# Patient Record
Sex: MALE | Race: WHITE | NOT HISPANIC OR LATINO | Employment: OTHER | URBAN - NONMETROPOLITAN AREA
[De-identification: names, ages, dates, MRNs, and addresses within clinical notes are randomized per-mention and may not be internally consistent; named-entity substitution may affect disease eponyms.]

---

## 2023-10-31 PROBLEM — I48.4 ATYPICAL ATRIAL FLUTTER (MULTI): Status: ACTIVE | Noted: 2023-10-31

## 2023-10-31 PROBLEM — R06.09 DYSPNEA ON EXERTION: Status: ACTIVE | Noted: 2023-10-31

## 2023-10-31 PROBLEM — E66.3 OVERWEIGHT WITH BODY MASS INDEX (BMI) OF 27 TO 27.9 IN ADULT: Status: ACTIVE | Noted: 2023-10-31

## 2023-10-31 PROBLEM — I42.8 NONISCHEMIC CARDIOMYOPATHY (MULTI): Status: ACTIVE | Noted: 2023-10-31

## 2023-10-31 PROBLEM — Z87.891 FORMER SMOKER: Status: ACTIVE | Noted: 2023-10-31

## 2023-10-31 PROBLEM — I50.9 CHF (NYHA CLASS II, ACC/AHA STAGE C) (MULTI): Status: ACTIVE | Noted: 2023-10-31

## 2023-10-31 PROBLEM — I48.91 ATRIAL FIBRILLATION (MULTI): Status: ACTIVE | Noted: 2023-10-31

## 2023-10-31 RX ORDER — PANTOPRAZOLE SODIUM 40 MG/1
1 TABLET, DELAYED RELEASE ORAL DAILY
COMMUNITY
End: 2023-11-01 | Stop reason: ALTCHOICE

## 2023-10-31 RX ORDER — CARVEDILOL 6.25 MG/1
3.12 TABLET ORAL 2 TIMES DAILY
COMMUNITY
Start: 2022-08-05 | End: 2024-05-07 | Stop reason: DRUGHIGH

## 2023-10-31 RX ORDER — SPIRONOLACTONE 25 MG/1
25 TABLET ORAL DAILY
COMMUNITY
End: 2023-11-01 | Stop reason: ALTCHOICE

## 2023-10-31 RX ORDER — CHLORHEXIDINE GLUCONATE 40 MG/ML
SOLUTION TOPICAL
COMMUNITY
Start: 2022-11-14 | End: 2023-11-01 | Stop reason: ALTCHOICE

## 2023-10-31 RX ORDER — LOSARTAN POTASSIUM 25 MG/1
1 TABLET ORAL DAILY
COMMUNITY
Start: 2022-05-26 | End: 2024-05-16

## 2023-10-31 RX ORDER — EPLERENONE 25 MG/1
1 TABLET, FILM COATED ORAL DAILY
COMMUNITY
Start: 2022-05-26 | End: 2023-11-01

## 2023-11-01 ENCOUNTER — OFFICE VISIT (OUTPATIENT)
Dept: CARDIOLOGY | Facility: CLINIC | Age: 78
End: 2023-11-01
Payer: MEDICARE

## 2023-11-01 VITALS
WEIGHT: 175 LBS | HEIGHT: 67 IN | HEART RATE: 62 BPM | BODY MASS INDEX: 27.47 KG/M2 | SYSTOLIC BLOOD PRESSURE: 108 MMHG | DIASTOLIC BLOOD PRESSURE: 62 MMHG

## 2023-11-01 DIAGNOSIS — E66.3 OVERWEIGHT WITH BODY MASS INDEX (BMI) OF 27 TO 27.9 IN ADULT: ICD-10-CM

## 2023-11-01 DIAGNOSIS — Z79.01 LONG TERM CURRENT USE OF ANTICOAGULANT THERAPY: ICD-10-CM

## 2023-11-01 DIAGNOSIS — I42.8 NONISCHEMIC CARDIOMYOPATHY (MULTI): ICD-10-CM

## 2023-11-01 DIAGNOSIS — I48.0 PAROXYSMAL ATRIAL FIBRILLATION (MULTI): Primary | ICD-10-CM

## 2023-11-01 PROCEDURE — 99213 OFFICE O/P EST LOW 20 MIN: CPT | Performed by: NURSE PRACTITIONER

## 2023-11-01 PROCEDURE — 1036F TOBACCO NON-USER: CPT | Performed by: NURSE PRACTITIONER

## 2023-11-01 PROCEDURE — 1126F AMNT PAIN NOTED NONE PRSNT: CPT | Performed by: NURSE PRACTITIONER

## 2023-11-01 PROCEDURE — 1159F MED LIST DOCD IN RCRD: CPT | Performed by: NURSE PRACTITIONER

## 2023-11-01 PROCEDURE — 1160F RVW MEDS BY RX/DR IN RCRD: CPT | Performed by: NURSE PRACTITIONER

## 2023-11-01 RX ORDER — SPIRONOLACTONE 25 MG/1
25 TABLET ORAL DAILY
Qty: 90 TABLET | Refills: 3 | Status: SHIPPED | OUTPATIENT
Start: 2023-11-01

## 2023-11-01 NOTE — PATIENT INSTRUCTIONS
Please bring all medicines, vitamins, and herbal supplements with you when you come to the office.    Prescriptions will not be filled unless you are compliant with your follow up appointments or have a follow up appointment scheduled as per instruction of your physician. Refills should be requested at the time of your visit.    PLAN:   Through informed decision making process incorporating patients unique circumstances, the following treatment plan will be initiated:    1.  Prescription drug management of cardiovascular medication for efficacy, adherence to treatment, side effect assessment and polypharmacy. Current treatment clinically warranted and to continue without modifications.    2.  Chem6    3.  Return for follow-up; in the interim, contact the office if new symptoms arise.  Dr. Bennett 6 months

## 2023-11-02 ASSESSMENT — ENCOUNTER SYMPTOMS
PND: 0
PALPITATIONS: 0
NEAR-SYNCOPE: 0
SYNCOPE: 0
IRREGULAR HEARTBEAT: 0
ORTHOPNEA: 0
DYSPNEA ON EXERTION: 0

## 2023-11-02 NOTE — ASSESSMENT & PLAN NOTE
CHADS VASc 4 anticoagulated full dose Xarelto  Currently denies bleeding diatheses  Needs annual labs for creatinine

## 2023-11-02 NOTE — PROGRESS NOTES
"Chief Complaint  \"Doing just fine\"    Reason for Visit  Routine 6-month follow-up  Patient presents to the office today for outpatient follow-up for atrial fibrillation, anticoagulation, cardiomyopathy.  Last evaluated in clinic by Dr. Bennett May 2023.    Presents today ambulatory with steady gait.  Accompanied by spouse  Patient denies any hospitalizations or significant changes to interval medical history since last office follow-up.   No routine PCP follow-up, no labs this year.    History of Present Illness   Patient is an extremely pleasant 78-year-old gentleman who presents without voice cardiovascular complaints.  Remains aerobically active where he walks 2 miles daily, over the summer was able to do yard work, he has stairs that he needs to go up and down at home.  He denies any type of exertional complaints.  He ambulated in from the parking lot without concerns.    He reports being highly symptomatic in atrial fibrillation and denies any recurrence.  Remains anticoagulated.  Need to check renal function for creatinine clearance.    Improved LVEF 50 to 55%.  Discussed rationale behind remaining on GDMT.    Patient reports that overall has no complaint(s) of chest pain, exertional chest pressure/discomfort, irregular heart beat, and palpitations    Daily activity:   aerobically active, walks 2 miles   Denies any change in exercise capacity or functional tolerance since last office visit.    Overall patient is pleased with current state of cardiovascular health.  At this time there are no indications for additional cardiovascular testing or need for medication changes.    Discussed the dynamic nature of coronary artery disease and the importance of seeking medical attention if new symptoms arise.    Review of Systems   Cardiovascular:  Negative for chest pain, dyspnea on exertion, irregular heartbeat, leg swelling, near-syncope, orthopnea, palpitations, paroxysmal nocturnal dyspnea and syncope.        Visit " "Vitals  /62 (BP Location: Right arm, Patient Position: Sitting)   Pulse 62   Ht 1.702 m (5' 7\")   Wt 79.4 kg (175 lb)   BMI 27.41 kg/m²   Smoking Status Former   BSA 1.94 m²     Physical Exam  Vitals and nursing note reviewed.   Constitutional:       Appearance: Normal appearance.   Cardiovascular:      Rate and Rhythm: Normal rate and regular rhythm.      Heart sounds: Normal heart sounds.   Pulmonary:      Effort: Pulmonary effort is normal.      Breath sounds: Normal breath sounds.   Musculoskeletal:      Cervical back: Full passive range of motion without pain.      Right lower leg: No edema.      Left lower leg: No edema.   Skin:     General: Skin is cool.   Neurological:      Mental Status: He is alert and oriented to person, place, and time.   Psychiatric:         Attention and Perception: Attention normal.         Mood and Affect: Mood normal.         Behavior: Behavior is cooperative.        No Known Allergies     Current Outpatient Medications   Medication Instructions    carvedilol (COREG) 3.125 mg, oral, 2 times daily    losartan (Cozaar) 25 mg tablet 1 tablet, oral, Daily    rivaroxaban (XARELTO) 20 mg, oral, Daily, Take with food.    spironolactone (ALDACTONE) 25 mg, oral, Daily      Assessment:      Atrial fibrillation (CMS/HCC)  Dec 2022 RFA ablation  Jan 2023 DCC  Denies recurrent symptoms    Nonischemic cardiomyopathy (CMS/HCC)  May 2022 TTE EF 40-45% (afib)    May 2023 TTE  LVEF 50-55%  LA mild/moderate  RA mod/severe dilated  MR mild  RVSP 36    To continue GDMT    Cardiac and Vasculature  May 2022 MPI no ischemia  Daily activity greater than 4 METS without concerning symptoms    Long term current use of anticoagulant therapy  CHADS VASc 4 anticoagulated full dose Xarelto  Currently denies bleeding diatheses  Needs annual labs for creatinine    Overweight with body mass index (BMI) of 27 to 27.9 in adult  Reviewed the merits of healthy lifestyle choices on overall cardiovascular health.   "   Plan:     Through informed decision making process incorporating patients unique circumstances, the following treatment plan will be initiated:    1.  Prescription drug management of cardiovascular medication for efficacy, adherence to treatment, side effect assessment and polypharmacy. Current treatment clinically warranted and to continue without modifications.    2.  Chem6    3.  Return for follow-up; in the interim, contact the office if new symptoms arise.  Dr. Bennett 6 months      Carmela Urena  MSN, APRN-CNP, PMHNP-BC  [facility]    Please excuse any errors in grammar or translation related to this dictation. Voice recognition software was utilized to prepare this document.

## 2023-11-02 NOTE — ASSESSMENT & PLAN NOTE
May 2022 TTE EF 40-45% (afib)    May 2023 TTE  LVEF 50-55%  LA mild/moderate  RA mod/severe dilated  MR mild  RVSP 36    To continue GDMT

## 2023-11-07 ENCOUNTER — HOSPITAL ENCOUNTER
Dept: HOSPITAL 101 - LAB | Age: 78
Discharge: HOME | End: 2023-11-07
Payer: MEDICARE

## 2023-11-07 DIAGNOSIS — I42.8: ICD-10-CM

## 2023-11-07 DIAGNOSIS — Z79.01: ICD-10-CM

## 2023-11-07 DIAGNOSIS — I48.0: Primary | ICD-10-CM

## 2023-11-07 LAB
ANION GAP: 13.1
BLOOD UREA NITROGEN: 22 MG/DL (ref 7–18)
CALCIUM: 9 MG/DL (ref 8.5–10.1)
CARBON DIOXIDE: 28.5 MMOL/L (ref 21–32)
CHLORIDE: 104 MMOL/L (ref 98–107)
CO2 BLD-SCNC: 28.5 MMOL/L (ref 21–32)
ESTIMATED GFR (AFRICAN AMERICA: >60 (ref 60–?)
ESTIMATED GFR (NON-AFRICAN AME: >60 (ref 60–?)
GLUCOSE BLD-MCNC: 92 MG/DL (ref 74–106)
POTASSIUM SERPLBLD-SCNC: 4.6 MMOL/L (ref 3.5–5.1)
POTASSIUM: 4.6 MMOL/L (ref 3.5–5.1)
SODIUM BLD-SCNC: 141 MMOL/L (ref 136–145)
SODIUM: 141 MMOL/L (ref 136–145)

## 2023-11-07 PROCEDURE — 80048 BASIC METABOLIC PNL TOTAL CA: CPT

## 2023-11-07 PROCEDURE — 36415 COLL VENOUS BLD VENIPUNCTURE: CPT

## 2024-01-19 ENCOUNTER — HOSPITAL ENCOUNTER (OUTPATIENT)
Dept: HOSPITAL 101 - LAB | Age: 79
Discharge: HOME | End: 2024-01-19
Payer: MEDICARE

## 2024-01-19 DIAGNOSIS — R05.9: Primary | ICD-10-CM

## 2024-01-19 PROCEDURE — 0202U NFCT DS 22 TRGT SARS-COV-2: CPT

## 2024-01-19 NOTE — XMS_ITS
Comprehensive CCD (C-CDA v2.1)  
  
                          Created on: 2024  
  
  
ENMANUEL ARRINGTON  
External Reference #: [id number]  
: 1945  
Sex: Male  
  
Demographics  
  
  
                                        Address             [address] [place]  [address]  
   
                                        Home Phone          [phone]  
   
                                        Home Phone          [phone]  
   
                                        Preferred Language  en  
   
                                        Marital Status        
   
                                        Muslim Affiliation Unknown  
   
                                        Race                White  
   
                                        Ethnic Group        Not  or Lati  
no  
  
  
Author  
  
  
                                        Name                Unknown  
   
                                        Address             [address] [facility]  
[address]  
[place]  [address]  
   
                                        Phone               [phone]  
   
                                        Organization        [facility]  
  
  
Care Team Providers  
  
  
                                Care Team Member Name Role            Phone  
   
                                MD Leatha Jansen Primary Care Provider [phone]  
[phone]  
   
                                MD Leatha Jansen Attending Provider [phone]  
[phone]  
   
                                DO LAURA Murphy Referring Provider [phone]  
   
                                Leatha Jansen  Unavailable     [phone]  
   
                                Unavailable     Unavailable     [phone]  
   
                                DO LAURA Murphy Attending Provider [phone]  
   
                                MD George Leonard Referring Provider [phone]  
   
                                DR LEATHA JANSEN Primary Care    Unavailable  
   
                                DR LEATHA JANSEN Attending       Unavailable  
   
                                SOM, DR ZHANG Admitting       Unavailable  
   
                                SOM, DR ZHANG Primary Care    Unavailable  
   
                                SOM, DR ZHANG Attending       Unavailable  
   
                                SOM, DR ZHANG Admitting       Unavailable  
   
                                SOM, DR ZHANG Attending       Unavailable  
   
                                OSM, DR ZHANG Admitting       Unavailable  
   
                                DR LEATHA JANSEN Primary Care    Unavailable  
   
                                DR FRANCIA MONTES Consulting      Unavailable  
   
                                DR LEATHA JANSEN Consulting      Unavailable  
   
                                DR ALYCE CAZARES Consulting      Unavailable  
   
                                CORKY GONZALEZ Consulting      Unavailable  
   
                                SURENDRA MANCERA Consulting      Unavailable  
   
                                SOCRATES RICHARDS       Consulting      Unavailable  
   
                                DR LEATHA JANSEN Primary Care    Unavailable  
   
                                FAWWAD, BLAIR H Admitting       Unavailable  
   
                                FAWWAD, BLAIR H Attending       Unavailable  
   
                                FAWWAD, BLAIR H Attending       Unavailable  
   
                                FAWWAD, BLAIR H Admitting       Unavailable  
   
                                DR LEATHA JANSEN Primary Care    Unavailable  
   
                                FAWWAD, BLAIR H Attending       Unavailable  
   
                                FAWWAD, BLAIR H Admitting       Unavailable  
   
                                DR LEATHA JANSEN Primary Care    Unavailable  
   
                                FAWWAD, BLAIR H Attending       Unavailable  
   
                                FAWWAD, BLAIR H Admitting       Unavailable  
   
                                DR LEATHA JANSEN Primary Care    Unavailable  
   
                                FAWWAD, BLAIR H Attending       Unavailable  
   
                                FAWWAD, BLAIR H Admitting       Unavailable  
   
                                HOY, DR ZHANG Primary Care    Unavailable  
   
                                FAWWAD, BLAIR H Attending       Unavailable  
   
                                FAWWAD, BLAIR H Admitting       Unavailable  
   
                                HOY, DR ZHANG Primary Care    Unavailable  
   
                                HOY, DR ZHANG Consulting      Unavailable  
   
                                HOY, DR ZHANG Attending       Unavailable  
   
                                HOY, DR ZHANG Admitting       Unavailable  
   
                                HOY, DR ZHANG Primary Care    Unavailable  
   
                                HOY, DR ZHANG Consulting      Unavailable  
   
                                HOY, DR ZHANG Attending       Unavailable  
   
                                HOY, DR ZHANG Admitting       Unavailable  
   
                                HOY, DR ZHANG Primary Care    Unavailable  
   
                                Zieber, DR Fleming Consulting      Unavailable  
   
                                HOY, DR ZHANG Consulting      Unavailable  
   
                                HOY, DR ZHANG Attending       Unavailable  
   
                                HOY, DR ZHANG Admitting       Unavailable  
   
                                HOY, DR ZHANG Primary Care    Unavailable  
   
                                HOY, DR ZHANG Attending       Unavailable  
   
                                HOY, DR ZHANG Admitting       Unavailable  
   
                                HOY, DR ZHANG Primary Care    Unavailable  
   
                                HOY, DR ZHANG Consulting      Unavailable  
   
                                LAURA Murphy Admitting       Unavailable  
   
                                JeffreyLAURA millan Referring       Unavailable  
   
                                JeffreyLAURA Attending       Unavailable  
   
                                Hoy, Leatha M  Primary Care    Unavailable  
   
                                JeffreyLAURA Ham Referring       Unavailable  
   
                                Hoy, Leatha M  Admitting       Unavailable  
   
                                Hoy, Leatha M  Attending       Unavailable  
   
                                Hoy, Leatha M  Primary Care    Unavailable  
   
                                JeffreyLAURA millan Attending       Unavailable  
   
                                Hoy, Leatha M  Primary Care    Unavailable  
   
                                Jeffrey, W Ham Admitting       Unavailable  
   
                                JeffreyLAURA Ham Attending       Unavailable  
   
                                Leonard, Vazquez Referring       Unavailable  
   
                                Hoy, Leatha M  Primary Care    Unavailable  
   
                                JeffreyLAURA millan Admitting       Unavailable  
   
                                Honeisha, Dr. Leatha Hawkins Primary Care    Unavail  
able  
   
                                Agustín Murphy Attending       Unavailable  
   
                                Arias, Dr. Donna Hills Attending       Un  
available  
   
                                UNKNOWN, PCP    Referring       Unavailable  
   
                                Hoy, Dr. Leatha Hawkins Primary Care    Unavail  
able  
   
                                Arias, Dr. Donna Hills Attending       Un  
available  
   
                                Arias, Dr. Donna Hills Referring       Un  
available  
   
                                Arias, Dr. Donna Hills Admitting       Un  
available  
   
                                Hoy, Dr. Leatha Hawkins Primary Care    Unavail  
able  
   
                                Hoy, Dr. Leatha Hawkins Primary Care    Unavail  
able  
   
                                Arias, Dr. Donna Hills Attending       Un  
available  
   
                                Agustín Murphy Referring       Unavailable  
   
                                MedSivan holm  Attending       Unavailable  
   
                                Sivan Schuler  Referring       Unavailable  
   
                                Hoy, Dr. Leatha Hawkins Primary Care    Unavail  
able  
   
                                Hoy, Dr. Leatha Hawkins Primary Care    Unavail  
able  
   
                                Meder, Sivan  Attending       Unavailable  
   
                                Dr. Donna Hernandes Attending       Un  
available  
   
                                Dr. Leatha Jansen Primary Care    Unavail  
montez Jansen, Dr. Leatha Hawkins Primary Care    Unavail  
able  
   
                                Agustín Murphy Attending       Unavailable  
   
                                Agustín Murphy Referring       Unavailable  
   
                                Dr. Leatha Jansen Primary Care    Unavail  
montez Jansen, Dr. Leatha Hawkins Referring       Unavail  
Agustín Ramon Attending       Unavailable  
   
                                Leatha Jansen MD Primary Care Provider [phone]  
   
                                SARATH MONTES  Attending       Unavailable  
   
                                LEATHA JANSEN Primary Care    Unavailable  
  
  
  
Medications  
Current Medications  
  
  
  
                      Medication Drug Class(es) Dates      Sig (Normalized) Sig   
(Original)  
   
                                                    apixaban 5 mg oral   
tablet  
(20 sources)                            Factor Xa   
Inhibitor                               Start:   
2022  
End:   
2023                              take 1 tablet by   
mouth twice daily                       Apixaban   
(Eliquis) 5 mg   
Tablet Active 5   
MG PO Twice daily   
2022   
3:25pm  
   
                                                    carvedilol 6.25 mg   
oral tablet  
(20 sources)                            alpha-Adrenergic   
Blocker,   
beta-Adrenergic   
Blocker                                 Start:   
2022                              take 0.5 tablet by   
mouth twice daily                       carvedilol   
(Coreg) 6.25 mg   
tablet Take 0.5   
tablets (3.125   
mg) by mouth   
twice a day. 0   
2022 Active  
  
  
  
                                        Start: 2022   take 1 tablet by liseth  
th twice   
daily at dinner                         Carvedilol 3.125 MG Oral Tablet   
take 1 tablet by mouth twice a day   
with MORNING AND EVENING MEAL   
Quantity: 3 Refills: 3 Ordered:   
10-Claudy-2023 Sivan Torres   
Start : 5-Aug-2022 Active  
   
                                        Start: 2022   take 1 tablet by liseth  
th twice   
daily at mealtime                       Carvedilol 6.25 MG Oral Tablet TAKE   
1 TABLET TWICE DAILY WITH MEALS.   
Quantity: 180 Refills: 3 Ordered:   
26-May-2022 Agustín Murphy DO   
Start : 26-May-2022 Active  
  
  
  
                                                    chlorhexidine gluconate   
1.2 mg/ml mouthwash  
(7 sources)                                         Start: 2022  
End: 2022                         take 15 mL by   
mouth in the   
morning                                 Chlorhexidine Gluconate   
0.12 % Mouth/Throat   
Solution RINSE MOUTH   
WITH 15ML (1 CAPFUL) FOR   
30 SECONDS AM AND PM   
AFTER TOOTHBRUSHING.   
EXPECTORATE AFTER   
RINSING, DO NOT SWALLOW   
Quantity: 1 Refills: 0   
Ordered: 2022   
Choco Perry Start :   
2022 End :   
2022 Active  
  
  
  
                                                    Start: 2022  
End: 2023                                     chlorhexidine (Hibiclens) 4   
% external liquid Apply topically.   
0   
2022 Discontinued (Therapy completed)  
   
                                                    Start: 2022  
End: 2023                                     Hibiclens 4 % External Liqui  
d Use as directed for preoperative   
shower. Quantity: 1 Refills: 0 Ordered: 2022 Choco Perry Start : 2022 End : 27-Mar-2023 Complete  
  
  
  
                                                    losartan potassium   
25 mg oral tablet  
(20 sources)                            Angiotensin 2   
Receptor Blocker                        Start:   
2022                              take 1 tablet   
by mouth once   
daily                                   losartan (Cozaar)   
25 mg tablet Take 1   
tablet (25 mg) by   
mouth once daily. 0   
2022 Active  
   
                                                    pantoprazole 40 mg   
delayed release oral   
tablet  
(16 sources)                            Proton Pump   
Inhibitor                               Start:   
2022  
End: 2023                         take 1 tablet   
by mouth once   
daily as   
needed                                  Pantoprazole   
(Protonix) 40 mg   
Tablet,Delayed   
Release (Dr/Ec)   
Active 40 MG PO   
Daily 2022 3:31pm Pt   
states taking prn  
   
                                                    rivaroxaban 20 mg   
oral tablet  
(1 source)                              Factor Xa   
Inhibitor                                           take 1 tablet   
by mouth once   
daily at   
mealtime                                rivaroxaban   
(Xarelto) 20 mg   
tablet Take 1   
tablet (20 mg) by   
mouth once daily.   
Take with food. 0   
Active  
   
                                                    spironolactone 25 mg   
oral tablet  
(2 sources)                             Aldosterone   
Antagonist                              Start:   
2023  
End: 2023                         take 1 tablet   
by mouth once   
daily                                   spironolactone   
(Aldactone) 25 mg   
tablet Indications:   
Paroxysmal atrial   
fibrillation   
(CMS/HCC) ,   
Nonischemic   
cardiomyopathy   
(CMS/HCC) Take 1   
tablet (25 mg) by   
mouth once daily.   
90 tablet 3   
2023 Active  
  
  
  
Completed/Discontinued Medications  
  
  
  
                      Medication Drug Class(es) Dates      Sig (Normalized) Sig   
(Original)  
   
                                                    eplerenone 25 mg   
oral tablet  
(20 sources)                            Aldosterone   
Antagonist                              Start:   
2022  
End:   
2023                              take 1 tablet by   
mouth once daily                        eplerenone (Inspra)   
25 mg tablet Take 1   
tablet (25 mg) by   
mouth once daily. 0   
2022   
Discontinued   
(Ineffective)  
  
  
  
Problems  
Active Problems  
  
  
                      Problem Classification Problem    Date       Documented Da  
te Episodic/Chronic  
   
                                                    Blindness and vision   
defects  
(1 source)                              Unspecified visual   
loss; Translations:   
[Unspecified visual   
loss]                                   Onset:   
2022                                          Chronic  
   
                                                    Cardiac dysrhythmias  
(20 sources)                            Atrial fibrillation;   
Translations:   
[Atrial   
fibrillation]                           Onset:   
2022                                          Chronic  
   
                                                    Congestive heart   
failure;   
nonhypertensive  
(4 sources)                             Unspecified   
diastolic   
(congestive) heart   
failure;   
Translations:   
[Congestive heart   
failure stage C]                        Onset:   
04-                10-                Chronic  
   
                                                    Esophageal disorders  
(1 source)                              Gastro-esophageal   
reflux disease   
without esophagitis;   
Translations:   
[Gastro-esophageal   
reflux disease   
without esophagitis]                    Onset:   
2022                                          Chronic  
   
                                                    Essential hypertension  
(1 source)                              Essential (primary)   
hypertension;   
Translations:   
[Essential (primary)   
hypertension]                           Onset:   
2022                                          Chronic  
   
                                                    Hypertension with   
complications and   
secondary hypertension  
(1 source)                              Hypertensive heart   
disease with heart   
failure;   
Translations: [HTN   
HEART DISEASE   
W/HEART FAIL]                           Onset:   
2022                                          Chronic  
   
                                                    Osteoarthritis  
(1 source)                              Unspecified   
osteoarthritis,   
unspecified site;   
Translations:   
[Unspecified   
osteoarthritis,   
unspecified site]                       Onset:   
2022                                          Chronic  
   
                                                    Other aftercare  
(4 sources)                             Encounter for   
therapeutic drug   
level monitoring;   
Translations: [ENC   
THERAPEUTC DRUG LEVL   
MONITORING]                             Onset:   
2022                                          Episodic  
   
                                                    Other aftercare  
(4 sources)                             Long term (current)   
use of   
anticoagulants;   
Translations: [LONG   
TERM CURRNT USE   
ANTICOAGULANTS]                         Onset:   
2022                                          Episodic  
   
                                                    Other aftercare  
(2 sources)                             Long-term current   
use of   
anticoagulant;   
Translations: [Long   
term (current) use   
of anticoagulants]                      Onset:   
2023                Episodic  
   
                                                    Other ear and sense   
organ disorders  
(1 source)                              Unspecified hearing   
loss, unspecified   
ear; Translations:   
[Unspecified hearing   
loss, unspecified   
ear]                                    Onset:   
2022                                          Chronic  
   
                                                    Other lower   
respiratory disease  
(20 sources)                            Dyspnea on exertion;   
Translations: [Other   
respiratory   
abnormalities]                          Onset:   
10-                10-                Episodic  
   
                                                    Other nutritional;   
endocrine; and   
metabolic disorders  
(20 sources)                            Overweight in   
adulthood with body   
mass index of 25 or   
more but less than   
30; Translations:   
[Overweight]                            Onset:   
10-                11-                Episodic  
   
                                                    Other nutritional;   
endocrine; and   
metabolic disorders  
(2 sources)                             Overweight;   
Translations:   
[Overweight]                            Onset:   
10-                                          Episodic  
   
                                                    Other nutritional;   
endocrine; and   
metabolic disorders  
(2 sources)                             Body mass index   
(BMI) 27.0-27.9,   
adult; Translations:   
[Body mass index   
(BMI) 27.0-27.9,   
adult]                                  Onset:   
10-                                          Episodic  
   
                                                    Reba-; endo-; and   
myocarditis;   
cardiomyopathy (except   
that caused by   
tuberculosis or   
sexually transmitted   
disease)  
(20 sources)                            Cardiomyopathy;   
Translations: [Other   
primary   
cardiomyopathies]                       Onset:   
2022                Chronic  
   
                                                    Screening and history   
of mental health and   
substance abuse codes  
(20 sources)                            Ex-smoker;   
Translations:   
[Personal history of   
tobacco use]                            Onset:   
12-                10-                Episodic  
   
                                        Comment on above:   quit somking at age   
34;   
   
                                                    Substance-related   
disorders  
(2 sources)                             Opioid abuse,   
uncomplicated;   
Translations:   
[Cocaine abuse, in   
remission]                              Onset:   
2022                                          Chronic  
   
                                                    Unclassified  
(1 source)                              CONTACT W/AND (SUSP)   
EXPOS COVID-19;   
Translations:   
[CONTACT W/AND   
(SUSP) EXPOS   
COVID-19]                               Onset:   
2022                                            
   
                                                    Unclassified  
(3 sources)                             COUGH, UNSPECIFIED;   
Translations:   
[COUGH, UNSPECIFIED]                    Onset:   
2022                                            
   
                                                    Unclassified  
(1 source)                              I48.91 - Unspecified   
atrial fibrillation;   
Translations:   
[I48.91 -   
Unspecified atrial   
fibrillation]                           Onset:   
2022                                            
   
                                                    Unclassified  
(1 source)                              Z01.812 - Encounter   
for preprocedural   
laboratory   
examination;   
Translations:   
[Z01.812 - Encounter   
for preprocedural   
laboratory   
examination]                            Onset:   
2022                                            
   
                                                    Unclassified  
(1 source)                              R06.00 - Dyspnea,   
unspecified;   
Translations:   
[R06.00 - Dyspnea,   
unspecified]                            Onset:   
2022                                            
   
                                                    Unclassified  
(1 source)                              Personal history of   
COVID-19;   
Translations:   
[Personal history of   
COVID-19]                               Onset:   
2022                                            
   
                                                    Viral infection  
(4 sources)                             COVID-19;   
Translations:   
[COVID-19]                              Onset:   
06-                                            
  
  
Past or Other Problems  
  
  
                      Problem Classification Problem    Date       Documented Da  
te Episodic/Chronic  
   
                                                    Diabetes mellitus   
without complication  
(1 source)                              Other abnormal   
glucose;   
Translations:   
[OTHER ABNORMAL   
GLUCOSE]                                Onset:   
2022                                          Episodic  
   
                                                    Immunizations and   
screening for   
infectious disease  
(1 source)                              Encounter for   
immunization;   
Translations:   
[ENCOUNTER FOR   
IMMUNIZATION]                           Onset:   
2022                                          Episodic  
   
                                                    Other aftercare  
(2 sources)                             Other long term   
(current) drug   
therapy;   
Translations: [OTH   
LONG TERM CURRENT   
DRUG THERAPY]                           Onset:   
2022                                          Episodic  
   
                                                    Other connective   
tissue disease  
(1 source)                              Unspecified rotator   
cuff tear or   
rupture of   
unspecified   
shoulder, not   
specified as   
traumatic;   
Translations: [UNS   
ROT CUFF TEAR/RUPT   
UNS SHOULDER]                           Onset:   
2022                                          Episodic  
   
                                                    Other non-traumatic   
joint disorders  
(4 sources)                             Pain in unspecified   
shoulder;   
Translations: [PAIN   
IN UNSPECIFIED   
SHOULDER]                               Onset:   
2022                                          Episodic  
   
                                                    Residual codes;   
unclassified  
(4 sources)                             Altered mental   
status,   
unspecified;   
Translations:   
[ALTERED MENTAL   
STATUS UNSPECIFIED]                     Onset:   
2022                                          Episodic  
   
                                                    Residual codes;   
unclassified  
(1 source)                              Procedure and   
treatment not   
carried out for   
other reasons;   
Translations:   
[Procedure and   
treatment not   
carried out for   
other reasons]                          Onset:   
2022                                          Episodic  
   
                                                    Unclassified  
(1 source)                              COUGH, UNSPECIFIED;   
Translations:   
[COUGH,   
UNSPECIFIED]                            Onset:   
2022                                            
   
                                                    Unclassified  
(1 source)                                          Onset:   
2023                  
  
  
  
Results  
  
  
                          Test Name    Value        Interpretation Reference   
Range                                   Facility  
   
                                                    Office Visit (Cardiology)on   
2023   
   
                                        Follow-up visit     Diagnoses/Problems  
Assessed  
Nonischemic   
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   
(I42.8)  
Atrial fibrillation   
(427.31) (I48.91)  
Overweight with body   
mass index (BMI) of 27   
to 27.9 in adult   
(278.02,V85.23)  
(E66.3,Z68.27)  
Former smoker (V15.82)   
(Z87.891)  
quit somking at age 34  
CHF (NYHA class II,   
ACC/AHA stage C) (428.0)   
(I50.9)  
Orders  
Atrial fibrillation,   
Atypical atrial flutter,   
CHF (NYHA class II,   
ACC/AHA stage C),  
Nonischemic   
cardiomyopathy  
Echocardiogram;   
Status:Hold For -   
Scheduling,Retrospective   
Authorization; Requested  
for:2023;  
CHF (NYHA class II,   
ACC/AHA stage C)  
Basic Metabolic Panel;   
Status:Active -   
Retrospective   
Authorization; Requested  
for:2023;  
Brain Natriuretic   
Peptide BNP;   
Status:Active -   
Retrospective   
Authorization; Requested  
for:2023;  
Overweight with body   
mass index (BMI) of 27   
to 27.9 in adult  
Healthy Weight Tips;   
Status:Complete -   
Retrospective   
Authorization; Done:   
2023  
Some eating tips that   
can help you lose   
weight.; Status:Complete   
- Retrospective  
Authorization; Done:   
2023  
SocHx: Former smoker  
Tobacco Use Screening;   
Status:Complete; Done:   
2023  
Patient Instructions  
Please bring all   
medicines, vitamins, and   
herbal supplements with   
you when you come to the   
office.  
Prescriptions will not   
be filled unless you are   
compliant with your   
follow up appointments   
or have a follow up   
appointment scheduled as   
per instruction of your   
physician. Refills   
should be requested at   
the time of your visit.  
Follow up in 6 months  
  
Chief Complaint  
ENMANUEL ARRINGTON is being seen   
for s/p ablation.  
77-year-old gentleman   
returns for follow-up   
following recent A-fib   
ablation with Dr. Hernandes, he is doing well   
from a cardiovascular   
standpoint with no   
shortness of breath,   
palpitations, recurrent   
heart failure. He   
remains on appropriate   
GDMT. He has a   
nonischemic   
cardiomyopathy by echo   
last year with ejection   
fraction of 40 to 45%.   
Stress perfusion exam   
last year was normal..   
He was subsequently   
referred for ablation   
which has proceeded.  
He has just returned   
from [place], he has put   
on a little weight after   
feeling better and   
restoration of normal   
sinus rhythm.  
NYHA classification is   
class II/C  
Recommendations,   
continue current   
therapies, continue   
Eliquis for the time   
being, obtain   
echocardiogram for LV   
functional assessment   
and follow-up with nurse   
practitioner within the   
next 6 months  
  
Surgical History   
Problems  
History of Catheter   
ablation  
History of Middle ear   
surgery  
History of Oral surgery  
History of Tonsillectomy  
Current Meds  
  
Medication   
NameInstruction  
Carvedilol 3.125 MG Oral   
Tablettake 1 tablet by   
mouth twice a day with   
MORNING AND EVENING MEAL  
Eliquis 5 MG Oral   
TabletTake 1 tablet   
twice daily  
Eplerenone 25 MG Oral   
TabletTake 1 tablet   
daily  
Losartan Potassium 25 MG   
Oral TabletTake 1 tablet   
daily  
Patient did not bring   
medication list or   
bottles. Updated   
verbally with patient  
Allergies  
Medication  
No Known Drug Allergies  
Recorded By: Almita Alford; 2022   
10:01:43 AM  
Social History  
Problems  
Consumes alcohol   
occasionally (V49.89)   
(Z78.9)  
Daily caffeine   
consumption  
2 cups of coffee daily,   
pop three times weekly  
Former smoker (V15.82)   
(Z87.891)  
quit somking at age 34  
Illicit drug use   
(305.90) (F19.90)  
oxycodone  
Review of Systems  
Constitutional: not   
feeling tired.  
Cardiovascular: no   
intermittent leg   
claudication and as   
noted in HPI.  
Respiratory: shortness   
of breath during   
exertion, but no cough   
and no shortness of   
breath.  
Gastrointestinal: no   
change in bowel habits   
and no blood in stools.  
Integumentary: no skin   
rashes.  
Neurological: no   
seizures and no frequent   
falls.  
All other systems have   
been reviewed and are   
negative for complaint.  
  
Vitals  
Vital Signs  
Recorded: 2023   
11:39AM  
Heart Rate62, R Radial  
Lhoqwosm958, LUE,   
Sitting  
Rvblfylkq95, LUE,   
Sitting  
Height5 ft 7 in  
Jnujnv391 lb  
BMI Fxvffxcuse47.25   
kg/m2  
BSA Calculated1.91  
Tobacco Useb) No  
Falls Screening (Age   
18+)a) No falls within   
the last year  
Physical Exam  
Constitutional: alert   
and in no acute   
distress.  
Neck: neck is supple,   
symmetric, trachea   
midline, no masses and   
no thyromegaly .  
Pulmonary: no increased   
work of breathing or   
signs of respiratory   
distress and lungs clear   
to auscultation.  
Cardiovascular: carotid   
pulses 2+ bilaterally   
with no bruit , JVP was   
normal, no thrills ,   
regular rhythm, normal   
S1 and S2, no murmurs ,   
pedal pulses 2+   
bilaterally and no edema   
.  
Abdomen: abdomen   
non-tender, no masses   
and no hepatomegaly .  
Skin: skin warm and dry,   
normal skin turgor .  
Psychiatric judgment and   
insight is normal and   
oriented to person,   
place and time .  
  
Signatures  
Electronically signed by   
: Agustín Murphy DO;   
May 4 2023 1:02PM EST   
(Author)            Normal                                   [facility]  
   
                                                    Tobacco Screening.on   
023   
   
                                        Fall risk assessment a) No falls within   
the   
last year                                                   [employer]   
[employer]   
250 DO  
Work Phone:   
[phone]  
   
                                                    Tobacco use status   
[facility]            b) No                                           [facility]   
[facility]   
250 DO  
Work Phone:   
[phone]  
   
                                                    Electrocardiogram 12 Leadon   
2023   
   
                                                    Electrocardiogram 12   
Lead                                    Ventricular Rate  
82  
Atrial Rate  
82  
P-R Interval  
218  
QRS Duration  
86  
Q-T Interval  
358  
QTC Calculation(Bazett)  
418  
P Axis  
68  
R Axis  
43  
T Axis  
56  
QRS Count  
13  
Q Onset  
227  
P Onset  
118  
P Offset  
155  
T Offset  
406  
QTC Fredericia  
397  
Diagnosis Class  
Abnormal  
Diagnosis  
Sinus rhythm with 1st   
degree A-V block  
Cannot rule out Anterior   
infarct , age   
undetermined  
Abnormal ECG  
When compared with ECG   
of 2023 15:28,  
Sinus rhythm has   
replaced Atrial flutter  
Confirmed by Sonny Hernandez ([id number]) on   
2023 7:56:03 AM Normal                                  [facility]  
   
                                                    No Panel Informationon    
   
                                                            [email or web address]  
[email or web address]  
[email or web address]  
.[email or web address]?[email or web address]  
laine?[email or web address]&  
Date=2023&Time=13%  
3a26%3a59%3a00&TestType=  
ECG&Site=1&OutputType=PD  
F&Ext=PDF                                                   [facility]   
[facility]   
250 DO  
Work Phone:   
[phone]  
   
                                                            Sinus rhythm with 1s  
t   
degree A-V block                                            [facility]   
[facility]   
250 DO  
Work Phone:   
[phone]  
   
                                 Abnormal                         [facility]   
[facility]   
250 DO  
Work Phone:   
[phone]  
   
                                 397 1                            [facility]   
[facility]   
250 DO  
Work Phone:   
[phone]  
   
                                 406 1                            [facility]   
[facility]   
250 DO  
Work Phone:   
[phone]  
   
                                 155 1                            [facility]   
[facility]   
250 DO  
Work Phone:   
[phone]  
   
                                 118 1                            [facility]   
[facility]   
250 DO  
Work Phone:   
[phone]  
   
                                 227 1                            [facility]   
[facility]   
250 DO  
Work Phone:   
[phone]  
   
                                 13 1                             [facility]   
[facility]   
250 DO  
Work Phone:   
[phone]  
   
                                 56 1                             [facility]   
[facility]   
250 DO  
Work Phone:   
[phone]  
   
                                 43 1                             [facility]   
[facility]   
250 DO  
Work Phone:   
[phone]  
   
                                 68 1                             [facility]   
[facility]   
250 DO  
Work Phone:   
[phone]  
   
                                 418 1                            [facility]   
[facility]   
250 DO  
Work Phone:   
[phone]  
   
                                 358 1                            [facility]   
[facility]   
250 DO  
Work Phone:   
[phone]  
   
                                 86 1                             [facility]   
[facility]   
250 DO  
Work Phone:   
[phone]  
   
                                 218 1                            [facility]   
[facility]   
250 DO  
Work Phone:   
[phone]  
   
                                 82 1                             [facility]   
[facility]   
250 DO  
Work Phone:   
[phone]  
   
                                                    Office Visit (Cardiology)on   
2023   
   
                                        Follow-up visit     Diagnoses/Problems  
Assessed  
Atrial fibrillation   
(427.31) (I48.91)  
Atypical atrial flutter   
(427.32) (I48.4)  
Orders  
Atrial fibrillation,   
Atypical atrial flutter  
Renew: Eliquis 5 MG Oral   
Tablet; Take 1 tablet   
twice daily  
Chief Complaint  
ENMANUEL ARRINGTON is being seen   
for a 3 month follow-up   
of atrial fibrillation.  
  
Adult Risk Screening  
There are no   
spiritual/cultural   
practices/values/needs   
that are important to   
know  
Initial Fall Risk   
Screening:  
ENMANUEL has not fallen in   
the last 6 months.  
Pain Scale: On a scale   
of 0 to 10, the patient   
rates the pain at 0.  
  
Living Will.  
Living Will: No living   
will on file.  
Healthcare POA: No   
healthcare proxy on   
file.  
Tobacco Screening: ENMANUEL   
does not use tobacco.  
  
History of Present   
Illness  
Enmanuel Arrington is a 78 y/o   
male initially referred   
by Dr Murphy for   
evaluation on AF.  
PMH includes NICM, R   
should rotator cuff   
injury, cocaine abuse x   
17 yrs (rehab in [place] now   
sober), oxycodone abuse   
(started in ) w/   
accidental overdose and   
AF.  
Treatment of his AF   
includes carvedilol. Pt   
has not required DCCV or   
AAD?s.  
Symptoms of his AF   
include palpitations and   
DELEON. .  
Pt established with Dr Murphy with the plan   
for DCCV. His DCCV was   
cancelled because pt had   
COVID.. Pt has a history   
of Oxycodone abuse which   
he was started on   
initially in  for a   
rotator cuff injury. Pt   
has been obtaining the   
medications on the   
street not through   
prescription. Pt had a   
recent accidental   
overdose which required   
ED visit an reversal   
with Narcan. Pt was take   
to rehab by his daughter   
about 1 week ago but   
left the same day.  
Pt reports that he has   
been sober since August  
Echo 2022: EF 40-45%,   
septal akinesis, LA   
mildly dilated  
Stress test 2022: no   
ischemia or myocardial   
infarction  
Now s/p PVI and adjuvant   
lesion RFA with Dr. Hernandes 2022  
Patient unfortunately   
caught a URI around   
Ana and also   
experienced a fever. He   
was started on   
antibiotics around   
ECG 2023 Atypical   
Aflutter HR 95 bpm.  
S/P DCCV with Dr. Murphy  
ECG 3/27/2023 NSR with   
first degree AV block,   
HR 82 bpm  
TODAY Patient presents   
for 3 month follow up   
post Afib ablation. He   
is feeling good. He   
denies any arrhythmia   
symptoms since his   
cardioversion. Per his   
wife, he still gets   
tired throughout the day   
and takes daily naps.   
But overall, he no   
longer feels   
palpitations or DELEON.  
  
Active Problems  
Problems  
Atrial fibrillation   
(427.31) (I48.91)  
Atypical atrial flutter   
(427.32) (I48.4)  
Dyspnea on exertion   
(786.09) (R06.09)  
Former smoker (V15.82)   
(Z87.891)  
quit somking at age 34  
Nonischemic   
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   
(I42.8)  
Overweight with body   
mass index (BMI) of 25   
to 25.9 in adult   
(278.02,V85.21)  
(E66.3,Z68.25)  
Surgical History  
Problems  
History of Middle ear   
surgery  
History of Oral surgery  
History of Tonsillectomy  
Current Meds  
  
Medication   
NameInstruction  
Carvedilol 3.125 MG Oral   
Tablettake 1 tablet by   
mouth twice a day with   
MORNING AND EVENING MEAL  
Eliquis 5 MG Oral   
TabletTake 1 tablet   
twice daily  
Eplerenone 25 MG Oral   
TabletTAKE 1 TABLET   
DAILY.  
Losartan Potassium 25 MG   
Oral TabletTAKE 1 TABLET   
DAILY AS DIRECTED.  
Allergies  
Medication  
No Known Drug Allergies  
Recorded By: Almita Alford; 2022   
10:01:43 AM  
Family History  
Mother  
Family history of lung   
cancer (V16.1) (Z80.1)  
Father  
Family history of   
cerebrovascular accident   
(CVA) (V17.1) (Z82.3)  
FH: CABG (coronary   
artery bypass surgery)   
(V17.3) (Z82.49)  
Sibling  
Family history of lung   
cancer (V16.1) (Z80.1)  
Social History  
Problems  
Consumes alcohol   
occasionally (V49.89)   
(Z78.9)  
Daily caffeine   
consumption  
2 cups of coffee daily,   
pop three times weekly  
Former smoker (V15.82)   
(Z87.891)  
quit somking at age 34  
Illicit drug use   
(305.90) (F19.90)  
oxycodone  
Review of Systems  
Constitutional: not   
feeling tired.  
Eyes: no eyesight   
problems.  
ENT: no hearing loss and   
no nosebleeds.  
Cardiovascular: no   
intermittent leg   
claudication and as   
noted in HPI.  
Respiratory: no chronic   
cough and no shortness   
of breath.  
Gastrointestinal: no   
change in bowel habits   
and no blood in stools.  
Genitourinary: no   
urinary frequency and no   
hematuria.  
Skin: no skin rashes.  
Neurological: no   
seizures and no frequent   
falls.  
Psychiatric: no   
depression and not   
suicidal.  
All other systems have   
been reviewed and are   
negative for complaint.  
  
Vitals  
Vital Signs  
Recorded: 2023   
01:18PM  
Heart Rate87  
Mviewgfa238, LUE,   
Sitting  
Kwtbcvouh95, LUE,   
Sitting  
Height5 ft 7 in  
Vunkeu893 lb 8 oz  
BMI Bttpacidck20.49   
kg/m2  
BSA Calculated1.91  
Tobacco Useb) No  
PHQ-2 #1. Over the last   
2 weeks have you felt   
down, depressed or   
hopeless? (If yes,   
answer PHQ-9 below)No  
PHQ-2 #2. Over the last   
2 weeks have you felt   
little interest or   
pleasure in doing   
things? (If yes, answer   
PHQ-9 below)No  
Falls Screening (Age   
18+)a) No falls within   
the last year  
O2 Hunzeqcksk32, RA  
Pain Scale0  
Physical Exam  
Constitutional: alert   
and in no acute   
distress.  
Neck: neck (more content   
not included)...    Normal                                   [facility]  
   
                                                    Tobacco Screening.on   
023   
   
                                                    Adult depression   
screening assessment No                                              [facility]  
[facility]  
[facility]   
[address]   
[place]  
Work Phone:   
[phone]  
   
                                        Fall risk assessment a) No falls within   
the   
last year                                                   [facility]  
[facility] [address]   
[place]  
Work Phone:   
[phone]  
   
                                                    Tobacco use status   
CP            b) No                                           [facility]  
[facility]   
[address]   
[place]  
Work Phone:   
[phone]  
   
                                                    ECG 12 lead ECGon 2023  
   
   
                                        ECG 12 lead ECG     [facility] [place] [id number]  
  
Electrocardiograph   
Report  
Signed  
  
Patient: Enmanuel Arrington   
MR#: [id number]  
  
: 1945   
Acct:[id number]  
  
Age/Sex: 77 / M ADM   
Date: 23  
  
Loc: [facility] Room: Type: [facility]   
SDC  
Attending Dr: LAURA Murphy DO  
  
Ordering Provider:   
George Leonard MD,   
[facility]  
Date of Service:   
23  
Accession #:   
([id number]) ECG/ECG 12   
lead ECG:   
Pre-cardioversion rhythm   
assessment  
  
  
Copies to:  
  
  
Test Reason :  
Blood Pressure : ***/***   
mmHG  
Vent. Rate : 104 BPM   
Atrial Rate : 258 BPM  
P-R Int : 000 ms QRS Dur   
: 074 ms  
QT Int : 328 ms P-R-T   
Axes : 000 034 025   
degrees  
QTc Int : 431 ms  
  
Atrial flutter with   
variable AV block  
Abnormal ECG  
When compared with ECG   
of 2022 08:01,  
Atrial flutter has   
replaced Atrial   
fibrillation  
Vent. rate has increased   
BY 46 BPM  
Confirmed by JASMYNE ARIZMENDI   
[facility], GEORGE ([id number]) on   
2023 10:03:22 AM  
  
Referred By: LAURA Murphy Electronically   
Signed By:GEORGE LEONARD MD [facility]  
  
  
  
Transcribed By: MUS  
Signed By George Leonard MD, [facility]  
23 1003       Normal                                  [facility]  
   
                                                    ECG post procedureon   
023   
   
                                        ECG post procedure  [facility] [place] [address]  
  
Electrocardiograph   
Report  
Signed  
  
Patient: Enmanuel Arrington   
MR#: [id number]  
  
: 1945   
Acct:[id number]  
  
Age/Sex: 77 / M ADM   
Date: 23  
  
Loc: [facility] Room: Type: [facility]  
Attending Dr: LAURA Murphy DO  
  
Ordering Provider: LAURA Murphy DO  
Date of Service:   
23/  
Accession #:   
([id number]) ECG/ECG   
post procedure: post   
cardioversion  
  
  
Copies to:  
  
  
Test Reason :  
Blood Pressure : 131/089   
mmHG  
Vent. Rate : 084 BPM   
Atrial Rate : 084 BPM  
P-R Int : 240 ms QRS Dur   
: 084 ms  
QT Int : 368 ms P-R-T   
Axes : 022 036 021   
degrees  
QTc Int : 434 ms  
  
Sinus rhythm with marked   
sinus arrhythmia with   
1st degree AV block  
Otherwise normal ECG  
When compared with ECG   
of 2023 10:01,   
(Unconfirmed)  
Sinus rhythm has   
replaced Atrial flutter  
Confirmed by JASMYNE ARIZMENDI   
[facility], GEORGE ([id number]) on   
2023 10:03:28 AM  
  
Referred By: LAURA Murphy Electronically   
Signed By:GEORGE LEONARD MD [facility]  
  
  
  
Transcribed By: MUS  
Signed By George Leonard MD, FACC  
23 1003       Normal                                  [facility]  
   
                                                    Electrolyteson 2023   
   
                                                    Anion gap   
[Moles/Vol]     12.4 mmol/L     Normal          6.0-15.0        [facility]  
   
                                        Comment on above:   Result Comment: PERF  
ORMED BY:  
[place] [address]  
[phone]  
PATHOLOGIST MEDICAL DIRECTOR  
NAKITA BLOUNT M.D.   
   
                                                            Performed By: #### L  
YTES ####  
[facility]  
[address] [place] [address] [place]   
   
                      Chloride [Moles/Vol] 102 mmol/L Normal          [facility]  
   
                                        Comment on above:   Performed By: #### L  
YTES ####  
[facility]  
[address] [place] [address] [place]   
   
                      CO2 [Moles/Vol] 24.8 mmol/L Normal     22.0-30.0  [facility]  
   
                                        Comment on above:   Performed By: #### L  
YTES ####  
[facility]  
[address] [place] [address] [place]   
   
                                                    Potassium   
[Moles/Vol]     4.2 mmol/L      Normal          3.5-5.1         [facility]  
   
                                        Comment on above:   Performed By: #### L  
YTES ####  
[facility]  
[address] [place] [address] [place]   
   
                      Sodium [Moles/Vol] 135 mmol/L Low        136-146    [facility]  
   
                                        Comment on above:   Performed By: #### L  
YTES ####  
[facility]  
[address] [place] [address] [facility]   
   
                                                    No Panel Informationon    
   
                                 12.4\S\12.4 Normal     6.0-15.0   [employer]  
   
[employer]   
250 DO  
Work Phone:   
[phone]  
   
                                        Comment on above:   PERFORMED BY:[facility] [place] [phone]ICAL   
DIRECTORNAKITA BLOUNT M.D.   
   
                                 24.8\S\24.8 Normal     22.0-30.0  [facility]  
   
[employer]   
250 DO  
Work Phone:   
[phone]  
   
                                 102\S\102  Normal          [facility]   
[employer]   
250 DO  
Work Phone:   
[phone]  
   
                                 4.2\S\4.2  Normal     3.5-5.1    [facility]   
[employer]   
250 DO  
Work Phone:   
[phone]  
   
                                                135\S\135       below low   
threshold                 136-146                   [facility]   
[employer]   
250 DO  
Work Phone:   
[phone]  
   
                                                    Electrocardiogram 12 Leadon   
2023   
   
                                                    Electrocardiogram 12   
Lead                                    Ventricular Rate  
95  
Atrial Rate  
250  
QRS Duration  
78  
Q-T Interval  
356  
QTC Calculation(Bazett)  
447  
R Axis  
36  
T Axis  
33  
QRS Count  
16  
Q Onset  
222  
T Offset  
400  
QTC Fredericia  
414  
Diagnosis Class  
Abnormal  
Diagnosis  
Atrial flutter with   
variable A-V block  
Abnormal ECG  
When compared with ECG   
of 14-DEC-2022 09:07,  
Atrial flutter has   
replaced Sinus rhythm  
ST no longer elevated in   
Inferior leads  
T wave amplitude has   
increased in Anterior   
leads  
Confirmed by Brett Correa ([id number]) on 2023   
10:20:21 AM         Normal                                  [facility] [place]  
   
                                                    Office Visit (Cardiology)on   
2023   
   
                                        Follow-up visit     Diagnoses/Problems  
Assessed  
Atypical atrial flutter   
(427.32) (I48.4)  
Atrial fibrillation   
(427.31) (I48.91)  
Nonischemic   
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   
(I42.8)  
Orders  
Atrial fibrillation,   
Nonischemic   
cardiomyopathy  
Renew: Carvedilol 3.125   
MG Oral Tablet; take 1   
tablet by mouth twice a   
day with  
MORNING AND EVENING MEAL  
Atypical atrial flutter  
Cardioversion;   
Status:Active; Requested   
for:2023;  
Chief Complaint  
ENMANUEL ARRINGTON is being seen   
for a 3 week follow-up   
of atrial fibrillation,   
atrial flutter and s/p   
PVI with adjuvant lesion   
RFA with Dr. Hernandes   
2022.  
  
Adult Risk Screening  
There are no   
spiritual/cultural   
practices/values/needs   
that are important to   
know  
Initial Fall Risk   
Screening:  
ENMANUEL has not fallen in   
the last 6 months. ENMANUEL   
does not have a fear of   
falling. He does not   
need assistance with   
sitting, standing or   
walking. Does not need   
assistance walking in   
his home. He does not   
need assistance in an   
unfamiliar setting. The   
patient is not using an   
assistive device.  
Pain Scale: On a scale   
of 0 to 10, the patient   
rates the pain at 0.  
  
Living Will.  
Living Will: No living   
will on file.  
Healthcare POA: No   
healthcare proxy on   
file.  
Declaration of Mental   
Health Treatment: No   
mental health treatment   
on file.  
Tobacco Screening: ENMANUEL   
does not use tobacco.   
Patient   
Declined/Screening not   
indicated.  
Domestic Violence   
Screen: Does not feel   
threatened or abused   
physically, emotionally   
or sexually. Do you feel   
UNSAFE?  
The patient feels safe   
in the home.  
Depression/Suicide   
Screening:  
During the past 2 weeks,   
the patient has not felt   
down, depressed or   
hopeless.  
During the past 2 weeks,   
the patient has not felt   
little interest or   
pleasure in doing   
things.  
He does not have a risk   
of suicide.  
He has not had thoughts   
of harming others.  
Procedure or Sedation   
Areas:  
patient has not had   
alcohol, recreational   
drugs, or prescription   
drugs for non-medical   
reasons this morning.  
Nutrition Screening:  
In the past month, there   
was not a day when I or   
anyone in my family went   
hungry because there was   
not enough food.  
Patient Education: The   
patient denies that they   
or the person with them   
has problems with   
hearing, speaking,   
seeing, moving around or   
learning  
The patient is   
comfortable filling out   
medical forms.  
  
History of Present   
Illness  
Enmanuel Arrington is a 78 y/o   
male initially referred   
by Dr Murphy for   
evaluation on AF.  
PMH includes NICM, R   
should rotator cuff   
injury, cocaine abuse x   
17 yrs (rehab in [place] now   
sober), oxycodone abuse   
(started in ) w/   
accidental overdose and   
AF.  
Treatment of his AF   
includes carvedilol. Pt   
has not required DCCV or   
AAD?s.  
Symptoms of his AF   
include palpitations and   
DELEON.  
Pt recently followed up   
with his PCP in the end   
of April/early May and   
pt was noted to be in   
AF. Pt had a history of   
irregular heartbeat but   
no diagnosis of AF. Pt   
was started on Eliquis.  
Pt established with Dr Murphy with the plan   
for DCCV. His DCCV was   
cancelled because pt had   
COVID.. Pt has a history   
of Oxycodone abuse which   
he was started on   
initially in  for a   
rotator cuff injury. Pt   
has been obtaining the   
medications on the   
street not through   
prescription. Pt had a   
recent accidental   
overdose which required   
ED visit an reversal   
with Narcan. Pt was take   
to rehab by his daughter   
about 1 week ago but   
left the same day.  
Pt reports that he has   
been sober since August  
Echo 2022: EF 40-45%,   
septal akinesis, LA   
mildly dilated  
Stress test 2022: no   
ischemia or myocardial   
infarction  
Now s/p PVI and adjuvant   
lesion RFA with Dr. Hernandes 2022  
Patient unfortunately   
caught a URI around   
Ana and also   
experienced a fever. He   
was started on   
antibiotics around    
and has a couple of more   
days still before they   
are complete.  
ECG 2023 Atypical   
Aflutter HR 95 bpm.  
TODAY Patient presents   
for 3 week follow up   
post ablation. He denies   
any arrhythmia symptoms.   
He is still coughing,   
especially at night due   
to his URI, but the SOB   
is improving after the   
antibiotics. He denies   
any palpitations,   
syncope, heart racing,   
and chest pain. He   
denies any   
bleeding/bruising/swelli  
ng at the right groin   
site. His wife is   
present with him and   
says he is taking all   
his medication properly.   
She would like a refill   
on the Coreg with the   
correct dosage so she   
doesn't have to cut the   
pill in half  
  
Active Problems  
Problems  
Atrial fibrillation   
(427.31) (I48.91)  
Dyspnea on exertion   
(786.09) (R06.09)  
Former smoker (V15.82)   
(Z87.891)  
quit somking at age 34  
Nonischemic   
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   
(I42.8)  
Overweight with body   
mass index (BMI) of 25   
to 25.9 in adult   
(278.02,V85.21)  
(E66.3,Z68.25)  
Surgical History  
Problems  
History of Middle ear   
surgery  
History of Oral surgery  
History of Tonsillectomy  
Current Meds  
  
Medication   
NameInstruction  
Carvedilol 6.25 MG Oral   
TabletTAKE 0.5 TABLET   
Twice daily  
Eliquis 5 MG Oral   
TabletTake 1 tablet   
twice daily  
Eplerenone 25 MG Oral   
TabletTAKE 1 TABLET   
DAILY.  
Hibiclens 4 % External   
LiquidUse as d (more   
content not included)... Normal                                   [facility]  
   
                                                    Tobacco Screening.on   
023   
   
                                                    Tobacco use status   
[facility]            b) No                                           [facility]  
[facility] [address]   
[place]  
Work Phone:   
[phone]  
   
                                                    Daily Progress Note-Electrop  
hysiologyon 2022   
   
                                                    Daily Progress   
Note-Electrophysiolo  
gy                                      Service:   
Electrophysiology  
  
Subjective Data:  
ENMANUEL ARRIGNTON is a 77   
year old Male who is   
Hospital Day # 2.  
  
Overnight Events:   
Patient had an   
uneventful night.  
Additional Information:  
-Pt was seen and   
examined at 9:10AM and   
tele reviewed  
-Pt was seen by EP   
fellow Nando Johnson MD   
this AM also  
-denies   
CP/dyspnea/LH/palps; ate   
breakfast, states feels   
 really good, the best  
since July   
-Has voided and   
ambulated without issues  
  
  
Objective Data:  
  
Objective Information:  
T PRBPMAPSpO2  
Value36.94441824/8798%  
Date/Time 7:   
7: 7:   
7: 7:00  
Range(36.2C - 36.5C )   
(74 - 84 ) (18 - 19 )   
(119 - 152 )/ (66 - 87 )  
(95% - 98% )  
  
  
Tele reviewed: SR   
70s-80s, occas PACs  
  
EKG post ablation   
22 reviewed (much   
artifact and wandering   
baseline but  
SR/SA, , QRS 84,   
QTc 437, appears no   
acute changes)  
  
Physical Exam by System:  
  
Constitutional: A+O x 3,   
no distress  
Eyes: Anicteric  
Head/Neck: NC/AT  
Respiratory/Thorax:   
harsh bases o/w CTAB  
Cardiovascular: RRR, no   
rub  
Gastrointestinal: soft,   
NT/ND +BS  
Extremities: no LE edema  
  
RFV access site-no   
bleeding, hematoma, or   
ecchymosis  
Psychological:   
Appropriate speech and   
affect.  
Skin: Warm and dry  
  
Medication:  
  
Medications:  
  
Continuous Medications  
------------------------  
--------  
No continuous   
medications are active  
  
Scheduled Medications  
------------------------  
--------  
  
1. Apixaban: 5 mg Oral   
Every 12 Hours  
2. Carvedilol: 3.125 mg   
Oral 2 Times a Day  
3. Eplerenone: 25 mg   
Oral Daily  
4. Furosemide: 40 mg   
Oral Daily  
5. Pantoprazole: 40 mg   
Oral 2 Times a Day  
  
PRN Medications  
------------------------  
--------  
  
1. Ibuprofen: 400 mg   
Oral Every 8 Hours  
2. Sodium Chloride 0.9%   
Injectable Flush: 10 mL   
IntraVenous Flush Every   
8  
Hours and as Needed  
  
  
Assessment and Plan:  
Code Status:  
Code StatusFull Code  
  
Advance Care Planning:  
Advance Care Planning:   
Patient didn't wish to   
or was unable to provide   
advance  
care plan  
  
Assessment:  
78yo M with NICM EF   
40-45% by Echo at   
[facility] 2022   
(Lexiscan nuc stress  
2022 negative for   
ischemia/infarct; not   
gated due to AF), COVID   
recovered,  
MARTINE (cocaine, now sober;   
oxycodone s/p 30d rehab   
2022), and AF on   
Eliquis.  
Follows with cards Dr Agustín Murphy.   
Yesterday underwent   
PVI/adjuvant  
lesions, L-sided AFL   
ablation and CTI line   
(see report). Feels well   
today and  
in NSR. Home today per   
EP fellow.  
  
#AF, s/p PVI, and   
ablation of 2 AFLs   
22; on Eliquis,   
PPI x 1 month for  
esophageal ppx  
#NICM-->on Coreg,   
losartan, eplerenone,   
Lasix  
  
-MED changes: Protonix   
40mg BID x 4 wks post   
ablation (eRx sent by   
fellow) o/w  
no changes in home meds  
-post procedure written   
instructions reviewed   
with pt and all   
questions  
answered  
-Pt and wife were   
advised on importance of   
adherence to Eliquis   
regimen without  
interruption for the 1st   
30 days post ablation   
(unless in event of   
emergency)  
-Pt was advised to cont   
regular F/u with primary   
cards Dr Murphy for  
medication optimization  
-F/u PCP 2 wks (pt to   
call)  
-EP F/u 1 month with   
Sivan Schuler CNP on   
22 in [place]  
  
DORITA Conklin,   
PAConstantinC, [facility]  
Inpatient Cardiac   
Electrophysiology  
Personal pager: [phone]   
()  
  
  
  
  
Electronic Signatures:  
Gustavo Diehl (PAC)   
(Signed 14-Dec-2022   
10:10)  
Authored: Service,   
Subjective Data,   
Objective Data,   
Assessment and Plan,   
Note  
Completion  
  
  
Last Updated:   
14-Dec-2022 10:10 by   
Gustavo Diehl (PAC) Normal                                  [facility] [place]  
   
                                                    Discharge Planning [id number]  
2022   
   
                                                    Discharge Planning   
Note2                                   Discharge Planning:  
Anticipated Discharge   
[id number]  
Discharge Planning  
2022 Discharge   
note. Pt stable for d/c.   
No pain reported.   
Reviewed  
discharge instructions   
with patient and wife.   
Answered any questions.   
IVs and  
telemetry removed. Pt   
discharged to home   
accompanied by wife via   
personal car.  
Pt has all personal   
items. Pt to leave unit   
via wheelchair.  
  
Assessment:  
Discharge Planning   
Assessment   
Date14-Dec-2022  
Lives Withspouse(1)  
Living   
Arrangementshouse(1)  
Stated Reason for   
AdmissionA-flutter   
ablation(1)  
Arrived Fromhome (1)  
Resource/Environmental   
Concernsnone(1)  
Anticipated Transition   
[place](1)  
Services Anticipated at   
Transitionnon(1)  
  
Discharge Documentation:  
Discharge/Transfer   
Date/Time14-Dec-2022   
11:20  
Discharged Accompanied   
Byspouse  
Transportation   
Methodprivate car  
Discharge Modewheelchair  
Code StatusCode Status   
order at time of   
discharge: Full Code  
Discharge Order   
Writtenyes  
[place] DNR Form Sent with   
Patient and/or Familyno  
Valuables/Medications/Be  
longings Returnedyes  
Final Disposition.Home  
  
  
  
Electronic Signatures:  
Emely Richardson (RN)   
(Signed 14-Dec-2022   
11:21)  
Authored: Discharge   
Planning, Assessment,   
Discharge Documentation  
  
  
Last Updated:   
14-Dec-2022 11:21 by   
Emely Richardson (CHEKO)  
  
References:  
1. Data Referenced From   
 Patient Profile - Adult   
v2  13-Dec-2022 22:11 Normal                                  [facility] [place]  
   
                                                    Electrocardiogram 12 Leadon   
2022   
   
                                                    Electrocardiogram 12   
Lead                                    Ventricular Rate  
83  
Atrial Rate  
81  
QRS Duration  
80  
Q-T Interval  
352  
QTC Calculation(Bazett)  
413  
R Axis  
32  
T Axis  
48  
QRS Count  
14  
Q Onset  
224  
T Offset  
400  
QTC Fredericia  
392  
Diagnosis Class  
Abnormal  
Diagnosis  
Sinus rhythm Sinus   
arrhythmia with 1st   
degree A-V block  
Low voltage QRS,   
consider pulmonary   
disease, pericardial   
effusion, or normal   
variant  
Nonspecific ST   
abnormality  
Abnormal ECG  
No previous ECGs   
available  
Confirmed by Sachin uG ([id number]) on   
2022 12:11:06 PM Normal                                  [facility] [place]  
   
                                                    No Panel Informationon    
   
                                                            [email or web address]  
[email or web address]  
[email or web address]  
[email or web address]  
laine?[email or web address]&  
Date=2022&Time=09%  
3a07%3a40%3a00&TestType=  
ECG&Site=1&OutputType=PD  
F&Ext=PDF                                                   [facility]  
[facility]   
[address]   
[place]  
Work Phone:   
[phone]  
   
                                                            Sinus rhythm Sinus   
arrhythmia with 1st   
degree A-V block                                            [facility]  
[facility]   
[address]   
[place]  
Work Phone:   
[phone]  
   
                                 Abnormal                         [facility]  
[facility] [place]   
[address]   
[place]  
Work Phone:   
[phone]  
   
                                 [address]                            [facility]  
[facility]   
[address]   
[place]  
Work Phone:   
[phone]  
   
                                 [address]                            [facility]  
[facility] [place]   
[address]   
[place]  
Work Phone:   
[phone]  
   
                                 224 1                            [facility]  
[facility]   
[address]   
[place]  
Work Phone:   
[phone]  
   
                                 14 1                             [facility]  
[facility] [place]   
[address]   
[place]  
Work Phone:   
[phone]  
   
                                 48 1                             [facility]  
[facility] [place]   
[address]   
[place]  
Work Phone:   
[phone]  
   
                                 32 1                             [facility]  
[facility] [place]   
[address]   
[place]  
Work Phone:   
[phone]  
   
                                 [address]                            [facility]  
[facility] [place]   
[address]   
[place]  
Work Phone:   
[phone]  
   
                                 [address] 1                            [facility]  
[facility] [place]   
[address]   
[place]  
Work Phone:   
[phone]  
   
                                 80 1                             [facility]  
[facility] [place]   
[address]   
[place]  
Work Phone:   
[phone]  
   
                                 81 1                             [facility]  
[facility]   
[address]   
[place]  
Work Phone:   
[phone]  
   
                                 83 1                             [facility]  
[facility] [address]   
[place]  
Work Phone:   
[phone]  
   
                                                    Order Reconciliationon    
   
                                        Order Reconciliation Page 1  
  
Discharge Reconciliation   
Document  
  
  
  
Reconciliation Type:   
Discharge requested on   
behalf of Jessica Johnson  
(Resident) done by   
Jessica Johnson (MD   
(Resident))  
  
Discharge -   
Reconciliation:   
14-Dec-2022 08:00 by:   
Jessica Johnson (MD  
(Resident))  
  
Home Medications   
EnteredHOME MEDICATIONS   
AT DISCHARGE   
DateReconciliation  
Comment/ Additional   
Information  
acetaminophen 500 mg   
oral tablet 2 tab(s)   
orally every 6 hours, As   
Needed  
13-Dec-2022 13:32   
acetaminophen 500 mg   
oral tablet 2 tab(s)   
orally every 6  
hours, As Needed   
13-Dec-2022 13:32   
acetaminophen 500 mg   
oral tablet is  
continued as   
acetaminophen 500 mg   
oral tablet  
apixaban 5 mg oral   
tablet 1 tab(s) orally 2   
times a day 13-Dec-2022   
13:27  
apixaban 5 mg oral   
tablet 1 tab(s) orally 2   
times a day 13-Dec-2022   
13:27  
apixaban 5 mg oral   
tablet is continued as   
apixaban 5 mg oral   
tablet  
carvedilol 6.25 mg oral   
tablet 1 tab(s) orally 2   
times a day (with meals)  
13-Dec-2022 13:30   
Discontinued;   
Discontinue from ORM  
  
carvedilol 6.25 mg oral   
tablet is not required  
eplerenone 25 mg oral   
tablet 1 tab(s) oral   
once a day 2022   
12:39  
eplerenone 25 mg oral   
tablet 1 tab(s) oral   
once a day 2022   
12:39  
eplerenone 25 mg oral   
tablet is continued as   
eplerenone 25 mg oral   
tablet  
furosemide 40 mg oral   
tablet 1 tab(s) orally   
once a day 13-Dec-2022   
13:32  
furosemide 40 mg oral   
tablet 1 tab(s) orally   
once a day 13-Dec-2022   
13:32  
furosemide 40 mg oral   
tablet is continued as   
furosemide 40 mg oral   
tablet  
losartan 25 mg oral   
tablet 1 tab(s) oral   
once a day 2022   
12:39  
losartan 25 mg oral   
tablet 1 tab(s) oral   
once a day 2022   
12:39  
losartan 25 mg oral   
tablet is continued as   
losartan 25 mg oral   
tablet  
multivitamin Multiple   
Vitamins oral tablet 1   
tab(s) oral once a day  
2022 12:39   
multivitamin Multiple   
Vitamins oral tablet 1   
tab(s) oral  
once a day 2022   
12:39 multivitamin   
Multiple Vitamins oral   
tablet is  
continued as   
multivitamin Multiple   
Vitamins oral tablet  
omeprazole 40 mg oral   
delayed release capsule   
1 cap(s) orally once a   
day, As  
Needed 13-Dec-2022 13:49   
Discontinued;   
Discontinue from ORM  
  
omeprazole 40 mg oral   
delayed release capsule   
is not required  
  
  
Current OrdersDateHOME   
MEDICATIONS AT DISCHARGE   
DateReconciliation   
Comment/  
Additional Information  
Apixaban Tablet   
(ELIQUIS)DOSE = 5 mg   
Oral Every 12 Hours   
14-Dec-2022 04:23  
Apixaban is not required  
Carvedilol Tablet   
(COREG)DOSE = 3.125 mg   
Oral 2 Times a Day   
13-Dec-2022  
18:49 carvedilol 3.125   
mg oral tablet 1 tab(s)   
orally 2 times a day  
14-Dec-2022 07:59   
Carvedilol is continued   
as carvedilol 3.125 mg   
oral tablet  
and modified  
Eplerenone Tablet   
(INSPRA)DOSE = 25 mg   
Oral Daily 13-Dec-2022   
18:49  
Eplerenone is not   
required  
Furosemide Tablet   
(LASIX)DOSE = 40 mg Oral   
Daily 13-Dec-2022 18:49  
Furosemide is not   
required  
Ibuprofen Tablet (ADVIL,   
MOTRIN)DOSE = 400 mg   
Oral Every 8 Hours, PRN   
Pain -  
Mild (1-3) 13-Dec-2022   
18:39 Ibuprofen is not   
required  
Pantoprazole Enteric   
Coated Tablet   
(PROTONIX)DOSE = 40 mg   
Oral 2 Times a Day  
13-Dec-2022 18:49   
pantoprazole 40 mg oral   
delayed release tablet 1   
tab(s)  
orally 2 times a day   
14-Dec-2022 08:00   
Prescription is created   
for  
pantoprazole 40 mg oral   
delayed release tablet  
Sodium Chloride 0.9%   
Injectable Flush via   
Peripheral LineVolume =   
10 mL  
IntraVenous Flush Every   
8 Hours and as Needed   
13-Dec-2022 18:39  
Sodium Chloride 0.9%   
Injectable Flush is not   
required  
  
  
All Active Home   
Medications at time of   
Discharge   
Reconciliation:   
14-Dec-2022  
08:00  
acetaminophen 500 mg   
oral tablet 2 tab(s)   
orally every 6 hours, As   
Needed  
apixaban 5 mg oral   
tablet 1 tab(s) orally 2   
times a day  
carvedilol 3.125 mg oral   
tablet 1 tab(s) orally 2   
times a day  
eplerenone 25 mg oral   
tablet 1 tab(s) oral   
once a day  
furosemide 40 mg oral   
tablet 1 tab(s) orally   
once a day  
losartan 25 mg oral   
tablet 1 tab(s) oral   
once a day  
multivitamin Multiple   
Vitamins oral tablet 1   
tab(s) oral once a day  
pantoprazole 40 mg oral   
delayed release tablet 1   
tab(s) orally 2 times a   
day                 Normal                                  [facility] [place]  
   
                                                    Patient Profile - Adult v2on  
 2022   
   
                                                    Patient Profile -   
Adult v2                                Profile:  
Initial Info:  
How to be AddressedBarry  
Spoken Language   
PreferredEnglish  
Source of   
Informationpatient  
Stated Reason for   
AdmissionA-flutter   
ablation  
Wants Family/Rep   
Notified of   
Admissionn/a; family   
present  
Notify PCPnotify PCP  
Informed of Patient   
Visiting Rightsyes  
[place]  
[place]   
Belongingsremains with   
patient  
Medications Brought to   
Hospitalno  
  
General Health:  
Weight in kg77.7   
kilogram(s)  
Weight in yse712.2   
pound(s)  
Weight Methodactual   
(measured)  
Scale Typestanding  
Height in cm167.5   
centimeter(s)  
Height in feet5 feet  
Height in inches5.98   
inch(es)  
Height Methodstated  
BMI (kg/m2)27.694 square   
meter  
  
RSP Based Care:  
How would you like to   
participate in your   
carestay informed  
What is the number one   
concern for you during   
this hospitalizationstay  
informed  
What is the most   
important thing we can   
do to support you during   
this  
hospitalizationstay   
informed  
Is there anything we   
need to know to best   
care for youstay   
informed  
  
Substance:  
Smoking Statusformer   
smoker  
Drug Usehistory of abuse  
  
Health Mgmt:  
Symptoms/Conditions   
Managed at   
Homecardiovascular  
Cardiovascular   
Managementmanaged  
  
Relationship/Environ:  
Resource/Environmental   
Concernsnone  
Primary Source of   
Support/Comfortspouse  
Lives Withspouse  
Living Arrangementshouse  
Services Anticipated at   
Transitionnone  
Anticipated Transition   
[facility]  
Significant   
IndicatorsComplete  
  
  
  
Information Review:  
Allergies, Home Meds and   
Significant Events have   
been Reviewed and   
Verified  
with Patient/Familyyes  
  
ALLERGY, INTOLERANCE,   
ADVERSE EVENT:  
Allergies:  
No Known Allergies:   
Active  
  
  
Electronic Signatures:  
Melvina Kim) (Signed 13-Dec-2022   
22:18)  
Authored: Initial Info,   
General Health, RSP   
Based Care, Substance,   
Health  
Mgmt,   
Relationship/Environ,   
Additional Information  
  
  
Last Updated:   
13-Dec-2022 22:18 by   
Melvina Kim)                Normal                                  [facility] [facility] [place]  
   
                                                    ACT-HIGH RANGEon 2022   
   
                      ACT-HIGH RANGE 365 SECONDS High       96 - 152   [facility]  
   
                                        Comment on above:   Result Comment: Note  
 new reference range as of 2019.  
Target ACT range will vary based on the patient population,  
clinical status, and surgical intervention occurring.   
   
                                                            Performed By: #### A  
CTP ####[id number] [place] [address]   
   
                      ACT-HIGH RANGE 354 SECONDS High       96 - 152   [facility]  
   
                                        Comment on above:   Result Comment: Note  
 new reference range as of 2019.  
Target ACT range will vary based on the patient population,  
clinical status, and surgical intervention occurring.   
   
                                                            Performed By: #### A  
CTP ####  
[facility]  
[id number]  
[place] [id number]   
   
                      ACT-HIGH RANGE 360 SECONDS High       96 - 152   [facility]  
   
                                        Comment on above:   Result Comment: Note  
 new reference range as of 2019.  
Target ACT range will vary based on the patient population,  
clinical status, and surgical intervention occurring.   
   
                                                            Performed By: #### A  
CTP ####  
[facility]  
[address]  
[place] [id number]   
   
                      ACT-HIGH RANGE 361 SECONDS High       96 - 152   [facility]  
   
                                        Comment on above:   Result Comment: Note  
 new reference range as of 2019.  
Target ACT range will vary based on the patient population,  
clinical status, and surgical intervention occurring.   
   
                                                            Performed By: #### A  
CTP ####[id number] [id number]   
   
                                                    Admission Risk Screen - Adul  
ton 2022   
   
                                                    Admission Risk   
Screen - Adult                          Allergies:  
Allergies:  
No Known Allergies:  
  
Patient Verification:  
New W ID Band Applied in   
my Departmentyes  
Patient Identity   
Verified Bypatient  
ID Band FULL Name,   
include Middle, spelling   
matches patient's ID   
used for  
verificationyes  
ID Band  Matches   
Patient ID used for   
Verficationyes  
ID Band MRN Matches EMR   
MRNyes  
  
Visitor Restriction:  
Coronavirus Visitor   
Restriction: Reasonable   
restrictions to   
in-person  
visitors will be   
observed due to current   
coronavirus pandemic.  
  
Travel History:  
COVID-19 Screening   
Completedno exposure or   
symptoms  
Travel or Exposure Past   
30 DaysNO travel to   
International locations   
in the  
past 30 days  
Ebola AlertFor   
Ebola-like Symptoms:   
Isolate Patient and   
Notify  
Provider/Infection   
Preventionist  
  
For Contact: Notify   
Provider/Infection   
Preventionist  
  
Advance Directive:  
Advance Directive/DNRno  
Advance Directive   
Information   
Givenpatient/family   
declined  
  
Rodríguez Fall Screen:  
History of falling   
(immediate or   
previous)no (0)  
Secondary Diagnosisyes   
(15)  
Intravenous Therapy/   
Heparin/Saline Lockyes   
(20)  
Gait/Transferringnormal/  
bedrest/wheelchair (0)  
Ambulatory   
Aidsnone/bedrest/nurse   
assist (0)  
Mental Statusoriented to   
own ability (0)  
Score: Low risk (<25).   
Moderate risk (25-44).   
High risk (>44).35  
Rodríguez   
InterventionsMODERATE   
INTERVENTIONS:  
*Low Interventions Plus:  
* falls risk   
band/sticker applied to   
patient,  
*yellow non-skid   
footwear,  
*instruct to call for   
assistance before  
getting out of bed,  
*bed/chair/bedside   
commode/toilet alarms,  
*sensory   
devices/ambulatory aides  
available and in reach,  
*medications reviewed   
for potential  
side effects and care   
planning.  
  
  
Family Violence Screen:  
Are you or have you been   
threatened or abused   
physically, emotionally,   
or  
sexually by anyoneno  
Has anyone ever   
threatened to hurt your   
family or your petsno  
Does anyone try to keep   
you from   
having/contacting other   
friends or doing  
things outside your   
homeno  
Do you feel UNSAFE going   
back to the place where   
you are livingno  
Do you feel anyone has   
exploited or taken   
advantage of you   
financially or of  
your personal propertyno  
Clinical assessment: Are   
there any apparent signs   
of injuries/behaviors   
that  
could be related to   
abuse/neglectno  
Social Service Consult   
for abuse/neglect needed   
this visitno  
  
Functional Screen:  
Functional Screen: In   
the recent/past 2-4   
weeks, patient or family   
have  
noticedno issues that   
require a   
speech/language consult   
at this time  
  
AM-PAC- Basic   
Mobility/Daily Activity:  
Patient baseline   
bedboundno  
Putting on and taking   
off regular lower body   
clothingnone  
Bathing (including   
washing, rinsing,   
drying)none  
Putting on and taking   
off regular upper body   
clothingnone  
Toileting, which   
includes using toilet,   
bedpan or urinalnone  
Taking care of personal   
grooming such as   
brushing teethnone  
Eating Mealsnone  
Daily Activity - Total   
Score24  
  
Learning Assessment   
(Patient):  
Patient is Able to be   
Assessed for Learningyes  
Factors Influencing   
Readiness to   
Learnacuteness of   
illness  
Factors that Impact   
Ability to Learnnone  
Devices/Methods Used to   
Communicatenone  
Learning   
Preferencesaudio  
Cultural   
Considerationsnone  
Developmental   
Considerationsnone  
Muslim   
Considerationsnone  
  
Learning Assessment   
(Other Learner):  
Other learner   
availableyes...  
Learnerspouse  
Factors Influencing   
Readiness to   
Learnacuteness of   
illness  
Factors that Impact   
Ability to Learnnone  
Devices/Methods Used to   
Communicatenone  
Learning   
Preferencesaudio  
Cultural   
Considerationsnone  
Developmental   
Considerationsnone  
Muslim   
Considerationsnone  
  
Depression Screen:  
During the past month,   
have you often been   
bothered by feeling   
down,  
depressed or hopelessno  
During the past month,   
have you often had   
little interest or   
pleasure in  
doing thingsno  
Have you had any   
thoughts of harming   
anyone elseno  
  
Bronson Suicide:  
Risk Screen Not   
Applicable/Able to   
Answerable to be   
screened  
In the Past Month: Have   
you wished you were dead   
or could go to sleep and   
not  
wake upno  
In the Past Month: Have   
you had any actual   
thoughts of killing   
yourselfno  
Lifetime: Have you ever   
done, started to do, or   
prepared to do anything   
to  
end your lifeno  
Bronson Suicide   
Risknegative  
  
Adult Nutrition Screen:  
Have you recently lost   
weight without tryingno  
Have you been eating   
poorly because of a   
decreased appetiteno  
Malnutrition Screening   
Tool Score0  
Malnutrition Screening   
Tool RiskMST = 0 or 1   
Not at risk. Eating well   
with  
little or no weight loss  
Nutrition Consult needed   
this visitno  
Can Patient Participate   
in Room Serviceyes  
Patient requires Paper   
Dishes/Plastic   
Utensilsno  
  
Pain Screen:  
Pain Scalenumerical 0-10  
Pain Scale   
Educationteaching   
provided  
Current Pain Level0 =   
None  
Acceptable Pain Level0 =   
None  
Expression of Pain   
(nonverbal)none  
Chronic Painyes  
Chronic Back pain   
locationlower, upper  
  
Spiritual Screen:  
Are there any cultural,   
spiritual (more content   
not included)...    Normal                                  [facility] [place]  
   
                                                    Discharge [id number]   
022   
   
                                        Discharge Profile2  Discharge Orders:  
Anticipated Discharge   
Date:  
Anticipated Discharge   
[id number]  
  
Problem List:  
Prelim Disch Dx:  
Atrial fibrillation:   
Catalog Name:   
Unspecified atrial   
fibrillation  
  
DNAR:  
Code Status at   
Discharge: Full Code  
  
Activity:  
May not drive for 2   
day(s) if you were   
driving prior to your   
procedure.  
  
Diet:  
DietHeart healthy  
  
Additional Orders:  
Additional Instructions  
* You will need to   
continue blood thinner   
(Eliquis) until   
instructed otherwise.  
It is important not to   
interrupt blood thinner   
for any reason (other   
than an  
emergency) during the   
1st month after   
ablation.  
  
* You will be on   
Pantoprazole (a   
heartburn medicine) for   
4 weeks to protect the  
esophagus as it can   
become irritated with   
ablation. It is very   
important that  
you take this   
medication.  
  
* All other medications   
will generally remain   
the same unless you are   
told  
otherwise. Resume taking   
your home medications   
today (including blood   
thinner)  
as listed on the   
discharge instructions.  
  
* In the first week   
post-ablation you should   
take it easy. No heavy   
lifting or  
heavy exercise, no   
treadmill. You can use   
the stairs if needed but   
go slowly  
and minimize the number   
of times up and down.  
  
* Some minor bruising is   
common at each groin   
access site with minor   
soreness  
as if you had banged the   
area. Bruising may   
occasionally be seen to   
extend down  
the leg. This is normal   
as is an occasional   
small quarter sized bump   
in the  
area. If larger swelling   
or more significant pain   
occurs at the area,   
please  
contact the office or go   
the nearest Emergency   
Room.  
  
* You may have some   
minor chest pain for the   
next week or so. The   
pain will  
often worsen with a deep   
breath and be better   
when leaning forward.   
This is  
pericardial chest pain   
from the ablation and is   
generally not of   
concern. It  
should resolve within a   
week although it might   
increase for a day or so   
after  
the ablation.  
  
* If you develop   
unexplained fevers   
exceeding 100 degrees   
anytime within the  
first 3 weeks   
post-ablation, you need   
to contact the office.   
Low grade fevers  
of around 99 degrees are   
common in the first day   
or so post-ablation.  
  
* Atrial fibrillation   
(AFib) can recur in all   
patients who undergo   
this  
ablation for up to 4-8   
weeks post-ablation. The   
ablation itself can   
cause  
inflammation   
(pericarditis) in the   
atria and this can cause   
AFib. Some patients  
will actually experience   
an increased amount of   
atrial arrhythmia early   
after  
ablation. Approximately   
1/3 of patients will   
have this early   
recurrence of  
AFib. Medications should   
be continued and your   
heart rate controlled.   
Nothing  
else needs be done   
initially except waiting   
as in many cases these   
episodes of  
AFib will prove self   
limited.  
  
* Continue to follow up   
with your primary care   
physician, primary   
cardiologist,  
and any other   
specialists you normally   
see.  
  
  
Call Provider If   
(Homegoing Patients):  
Breathing faster than   
normal.  
  
Fever of 100.4 F (38 C)   
or higher.  
  
Chills.  
  
Acting very sleepy and   
difficult to awaken.  
  
Any new concerning   
symptoms.  
  
Passing out.  
  
Cath Lab Interventional:  
Patient Instructions,   
Next 24 hours:  
DO NOT drive a car,   
operate machinery or   
power tools. It is   
recommended that a  
responsible adult be   
with you for the first   
24 hours.  
  
DO NOT drink any   
alcoholic drinks or take   
any non-prescriptive   
medications that  
contain alcohol for the   
first 24 hours.  
  
DO NOT make any   
important decisions for   
the first 24 hours.  
  
Activity:  
You are advised to go   
directly home from the   
hospital.  
  
DO NOT lift anything   
heavier than 10 pounds   
for one week, this   
allows for  
proper healing of the   
groin.  
  
No excessive exercise or   
treadmill use for one   
week. You may walk and   
do  
stairs, slowly.  
  
No sexual activities for   
24 hours after you   
arrive home.  
  
Wound Care:  
If slight bleeding   
should occur at site,   
lie down and have   
someone apply firm  
pressure just above the   
puncture site for 5   
minutes. If it continues   
or is  
profuse, call 911.   
Always notify your   
doctor if bleeding   
occurs.  
  
Keep site clean and dry.   
Let air dry or you may   
use a simple bandaid.  
  
Gently cleanse the   
puncture site in your   
groin with soap and   
water only.  
  
You may experience some   
tenderness, bruising or   
minimal inflammation. If   
you  
have any concerns, you   
may contact the Cath Lab   
or if any of these   
symptoms  
become excessive,   
contact your   
cardiologist or go to   
the emergency room.  
  
No tub baths, soaking,   
or swimming for one   
week.  
  
May shower the next day   
after your procedure.  
  
Other Instructions:  
If you have any   
questions about the   
effects of the sedative   
drugs or groin  
care, call the physician   
who performed your   
procedure.  
  
Provider FINAL REVIEW of   
Orders:  
Final Review:  
Final Review of   
Medication   
Reconciliation and   
Orders Completedby   
Physician  
Reviewing   
ProviderJessica Johnson MD (Resident) at   
14-Dec-2022 08:02:26  
  
Appointments:  
Follow-Up Appointment   
01:  
Physician/Dept/ServiceP   
(more content not   
included)...        Normal                                  [facility] [place]  
   
                                                    No Panel Informationon    
   
                                                365 {SECONDS}   above high   
threshold                 96 - 152                  [facility]  
[facility] [address]   
[place]  
Work Phone:   
[phone]  
   
                                        Comment on above:   Note new reference danielle padilla as of 2019. Target ACT range   
will   
vary based on the patient population, clinical status, and   
surgical intervention occurring.   
   
                                                354 {SECONDS}   above high   
threshold                 96 - 152                  [facility]  
[facility]   
[address]   
[place]  
Work Phone:   
[phone]  
   
                                        Comment on above:   Note new reference r  
valerie as of 2019. Target ACT range   
will   
vary based on the patient population, clinical status, and   
surgical intervention occurring.   
   
                                                360 {SECONDS}   above high   
threshold                 96 - 152                  [facility]  
[facility]   
[address]   
[place]  
Work Phone:   
[phone]  
   
                                        Comment on above:   Note new reference r  
valerie as of 2019. Target ACT range   
will   
vary based on the patient population, clinical status, and   
surgical intervention occurring.   
   
                                                361 {SECONDS}   above high   
threshold                 96 - 152                  [facility]  
[facility]   
[facility]   
[place]  
Work Phone:   
[phone]  
   
                                        Comment on above:   Note new reference r  
valerie as of 2019. Target ACT range   
will   
vary based on the patient population, clinical status, and   
surgical intervention occurring.   
   
                                                    Order Reconciliationon    
   
                                        Order Reconciliation Page 1  
  
Admission Reconciliation   
Document  
  
  
Reconciliation Type:   
Admission requested on   
behalf of Jessica Johnson  
(Resident) done by   
Jessica Johnson   
(Resident))  
  
Admission -   
Reconciliation:   
13-Dec-2022 18:49 by:   
Jessica Johnson (MD  
(Resident))  
  
Home   
MedicationsEnteredLast   
Dose TakenReconciled   
with current Order  
Reconciliation Comment/   
Additional Information  
acetaminophen 500 mg   
oral tablet 2 tab(s)   
orally every 6 hours, As   
Needed  
[id number],   
2022 Reviewed and Held  
apixaban 5 mg oral   
tablet 1 tab(s) orally 2   
times a   
[id number]  
2  
AM Reviewed and Held  
carvedilol 6.25 mg oral   
tablet 1 tab(s) orally 2   
times a day (with meals)  
13-Dec-202212-Dec-2022   
PM Carvedilol Tablet   
(COREG)DOSE = 3.125 mg   
Oral 2  
Times a Daycarvedilol   
6.25 mg oral tablet   
continued as the   
inpatient order  
Carvedilol  
eplerenone 25 mg oral   
tablet 1 tab(s) oral   
once a   
day13-Dec-202212-Dec-202  
2  
Eplerenone Tablet   
(INSPRA)DOSE = 25 mg   
Oral Dailyeplerenone 25   
mg oral  
tablet continued as the   
inpatient order   
Eplerenone  
furosemide 40 mg oral   
tablet 1 tab(s) orally   
once a   
[id number]  
2  
AM Furosemide Tablet   
(LASIX)DOSE = 40 mg Oral   
Dailyfurosemide 40 mg   
oral  
tablet continued as the   
inpatient order   
Furosemide  
losartan 25 mg oral   
tablet 1 tab(s) oral   
once a   
[id number]  
2  
Reviewed and Held  
multivitamin Multiple   
Vitamins oral tablet 1   
tab(s) oral once a   
day13-Dec-2022  
12-Dec-2022 Reviewed and   
Held  
omeprazole 40 mg oral   
delayed release capsule   
1 cap(s) orally once a   
day, As  
Needed13-Dec-2022Novembe  
r,  Pantoprazole   
Enteric Coated Tablet  
(PROTONIX)DOSE = 40 mg   
Oral 2 Times a   
Dayomeprazole 40 mg oral   
delayed release  
capsule continued as the   
inpatient order   
Pantoprazole  
  
  
Additional Current   
Orders  
Ibuprofen Tablet (ADVIL,   
MOTRIN)DOSE = 400 mg   
Oral Every 8 Hours, PRN   
Pain -  
Mild (1-3)  
Sodium Chloride 0.9%   
Injectable Flush via   
Peripheral LineVolume =   
10 mL  
IntraVenous Flush Every   
8 Hours and as Needed Normal                                  [facility] [place]  
   
                                                    Covid-19 PCR (CVDTBH)on    
   
                                                    SARS-CoV-2   
(COVID-19) RNA   
ARMANDO+probe Ql (Unsp   
spec)               Not detected        Normal              NOT   
DETECTED                                [facility]  
   
                                        Comment on above:   Result Comment: This  
 test is not yet approved or cleared by   
the   
[place] FDA. When there are no FDA-approved or cleared   
tests available, and other criteria are met, FDA can make tests   
available under an emergency access mechanism called an Emergency   
Use Authorization (EUA). The EUA for this test is supported by   
the  of Health and Human Service's (HHS's) declaration   
that circumstances exist to justify the emergency use of in vitro   
diagnostics for the detection and/or diagnosis of the virus that   
causes COVID-19. This EUA will remain in effect (meaning this   
test can be used) for the duration of the COVID-19 declaration   
justifying emergency of IVDs, unless it is terminated or revoked   
by FDA (after which the test may no longer be used).  
When diagnostic testing is negative, the possibility of a false   
negative should be considered in  
the context of a patient's recent exposures and the presence of   
clinical signs and symptoms  
consistent with SARS-CoV-2.   
   
                                                            Performed By: #### D  
HAILEY, ERUR ####  
[facility]  
[address] [place], [place] [address]  
Dr. Sam Gonzalez   
   
                                                    BASIC METABOLIC PANELon 11   
   
                                                    Anion gap   
[Moles/Vol]     15 mmol/L       Normal          10 - 20         [facility] [place]  
   
                                        Comment on above:   Performed By: #### B  
MP ####  
[facility]  
[id number]  
[place] [address]   
   
                      Calcium [Mass/Vol] 9.2 mg/dL  Normal     8.6 - 10.6 [facility]  
   
                                        Comment on above:   Performed By: #### B  
MP ####  
[facility]  
[address]  
[place] [address]   
   
                      Chloride [Moles/Vol] 106 mmol/L Normal     98 - 107   [facility]  
   
                                        Comment on above:   Performed By: #### B  
MP ####  
[facility]  
[address]  
[place] [address]   
   
                                                    Creatinine   
[Mass/Vol]      0.85 mg/dL      Normal          0.50 - 1.30     [facility] [place]  
   
                                        Comment on above:   Performed By: #### B  
MP ####  
[facility]  
[address]  
[place] [address]   
   
                                                    GFR/1.73 sq   
M.predicted among   
non-blacks MDRD   
(S/P/Bld) [Vol   
rate/Area]      88 mL/min/{1.73_m2} Normal          >90             [facility] [place]  
   
                                        Comment on above:   Result Comment: CALC  
ULATIONS OF ESTIMATED GFR ARE PERFORMED  
USING THE  CKD-EPI STUDY REFIT EQUATION  
WITHOUT THE RACE VARIABLE FOR THE IDMS-TRACEABLE  
CREATININE METHODS.  
https://jasn.asnjournals.org/content/early//ASN.6872573  
988   
   
                                                            Performed By: #### B  
MP ####  
[facility]  
[address]  
[place] [address]   
   
                      Glucose [Mass/Vol] 80 mg/dL   Normal     74 - 99    [facility]  
   
                                        Comment on above:   Performed By: #### B  
MP ####  
[facility]  
[address]  
[place] [address]   
   
                                                    HCO3 (Bld)   
[Moles/Vol]     24 mmol/L       Normal          21 - 32         [facility] [place]  
   
                                        Comment on above:   Performed By: #### B  
MP ####  
[facility]  
[address]  
[place] [address]   
   
                                                    Potassium   
[Moles/Vol]     4.6 mmol/L      Normal          3.5 - 5.3       [facility] [place]  
   
                                        Comment on above:   Performed By: #### B  
MP ####  
[facility]  
[address]  
[place] [address]   
   
                      Sodium [Moles/Vol] 140 mmol/L Normal     136 - 145  [facility]  
   
                                        Comment on above:   Performed By: #### B  
MP ####  
[facility]  
[address]  
[place] [address]   
   
                                                    Urea nitrogen   
[Mass/Vol]      24 mg/dL        High            6 - 23          [facility] [place]  
   
                                        Comment on above:   Performed By: #### B  
MP ####  
[facility]  
[address]  
[place] [address]   
   
                                                    CBCon 2022   
   
                                                    Erythrocyte   
distribution width   
(RBC) [Ratio]   13.3 %          Normal          11.5 - 14.5     [facility] [place]  
   
                                        Comment on above:   Performed By: #### C  
BC ####  
[facility]  
[address]  
[place] [address]   
   
                                                    Hematocrit (Bld)   
[Volume fraction] 43.9 %          Normal          41.0 - 52.0     [facility] [place]  
   
                                        Comment on above:   Performed By: #### C  
BC ####  
[facility]  
[address]  
[place] [address]   
   
                                                    Hemoglobin (Bld)   
[Mass/Vol]      14.8 g/dL       Normal          13.5 - 17.5     [facility] [place]  
   
                                        Comment on above:   Performed By: #### C  
BC ####  
[facility]  
[address]  
[place] [address]   
   
                                                    MCHC (RBC)   
[Mass/Vol]      33.7 g/dL       Normal          32.0 - 36.0     [facility] [place]  
   
                                        Comment on above:   Performed By: #### C  
BC ####  
[facility]  
[address]  
[place] [address]   
   
                                                    MCV (RBC) [Entitic   
vol]            104 fL          High            80 - 100        [facility] [place]  
   
                                        Comment on above:   Performed By: #### C  
BC ####  
[facility]  
[address]  
[place] [address]   
   
                      NUCLEATED RBC 0.0 /100 WBC Normal     0.0-0.0    [facility]  
   
                                        Comment on above:   Performed By: #### C  
BC ####  
[facility]  
[address]  
[place] [address]   
   
                                                    Platelets (Bld)   
[#/Vol]         168 10*3/uL     Normal          150 - 450       [facility] [place]  
   
                                        Comment on above:   Performed By: #### C  
BC ####  
[facility]  
[address]  
[place] [address]   
   
                      RBC        4.24 x10E12/L Low        4.50 - 5.90 [facility]  
   
                                        Comment on above:   Performed By: #### C  
BC ####  
[facility]  
[address]  
[place] [address]   
   
                      WBC (Bld) [#/Vol] 7.1 10*3/uL Normal     4.4 - 11.3 [facility]  
   
                                        Comment on above:   Performed By: #### C  
BC ####  
[facility]  
[address]  
[place] [address]   
   
                                                    COAGULATION SCREENon   
022   
   
                                                    aPTT Coag (Bld)   
[Time]          33 s            Normal          26 - 39         [facility] [place]  
   
                                        Comment on above:   Result Comment: THE   
APTT IS NO LONGER USED FOR MONITORING  
UNFRACTIONATED HEPARIN THERAPY.  
FOR MONITORING HEPARIN THERAPY,  
USE THE HEPARIN ASSAY.   
   
                                                            Performed By: #### C  
OAGS ####  
[facility]  
[address]  
[place] [address]   
   
                      PT Coag (PPP) [Time] 15.0 s     High       9.8 - 13.4 [facility]  
   
                                        Comment on above:   Performed By: #### C  
OAGS ####  
[facility]  
[address]  
[place] [address]   
   
                      PT, INR    1.3        High       0.9 - 1.1  [facility] [place]  
   
                                        Comment on above:   Performed By: #### C  
OAGS ####  
[facility]  
[address]  
[place] [address]   
   
                                                    DRUG SCREEN,URINEon 20  
22   
   
                      DRUG SCREEN COMMENT Canceled   Normal                [facility] [place]  
   
                                        Comment on above:   Order Comment: TEST   
DRUG SCREEN,URINE WAS CANCELLED,   
2022   
16:13 RBS update..   
   
                                                            Result Comment: Drug  
 screen results are presumptive and should   
not be used to assess  
compliance with prescribed medication. Contact the performing   
[facility]  
laboratory to add-on definitive confirmatory testing if   
clinically  
indicated.  
.  
Toxicology screening results are reported qualitatively. The   
concentration  
must be greater than or equal to the cutoff to be reported as   
positive. The  
concentration at which the screening test can detect an   
individual drug or  
metabolite varies. The absence of expected drug(s) and/or drug   
metabolite(s)  
may indicate non-compliance, inappropriate timing of specimen   
collection  
relative to drug administration, poor drug absorption,   
diluted/adulterated  
urine, or limitations of testing. For medical purposes only; not   
valid for  
forensic use.  
.  
Interpretive questions should be directed to the laboratory   
medical  
directors.   
   
                                                            Performed By: #### D  
RUG3 ####  
[facility]  
[address]  
[place] [address]   
   
                                                    DRUG SCREEN,URINE WITH REFLE  
X TO CONFIRMATIONon 2022   
   
                      AMPHETAMINE SCREEN,U Negative   Normal     NEGATIVE   [facility]  
   
                                        Comment on above:   Result Comment: CUTO  
FF LEVEL: 500 NG/ML  
Cross-reactivity has been reported with high concentrations  
of the following drugs: buproprion, chloroquine, chlorpromazine,  
ephedrine, mephentermine, fenfluramine, phentermine,  
phenylpropanolamine, pseudoephedrine, and propranolol.   
   
                                                            Performed By: #### D  
RUGR ####  
[facility]  
[address]  
[place] [address]   
   
                                                    BARBITURATES   
SCREEN,U        Negative        Normal          NEGATIVE        [facility] [place]  
   
                                        Comment on above:   Result Comment: CUTO  
FF LEVEL: 200 NG/ML   
   
                                                            Performed By: #### D  
RUGR ####  
[facility]  
[address]  
[place] [address]   
   
                                                    BENZODIAZEPINES   
SCREEN,U        Negative        Normal          NEGATIVE        [facility] [place]  
   
                                        Comment on above:   Result Comment: CUTO  
FF LEVEL: 200 NG/ML   
   
                                                            Performed By: #### D  
RUGR ####  
[facility]  
[address]  
[place] [id number]   
   
                                                    CANNABINOIDS   
SCREEN,U        Negative        Normal          NEGATIVE        [facility] [place]  
   
                                        Comment on above:   Result Comment: CUTO  
FF LEVEL: 50 NG/ML   
   
                                                            Performed By: #### D  
RUGR ####  
[facility]  
[address]  
[place] [address]   
   
                                                    COCAINE METABOLITE   
SCREEN,U        Negative        Normal          NEGATIVE        [facility] [place]  
   
                                        Comment on above:   Result Comment: CUTO  
FF LEVEL: 150 NG/ML   
   
                                                            Performed By: #### D  
RUGR ####  
[facility]  
[address]  
[place] [address]   
   
                      DRUG SCREEN COMMENT SEE BELOW  Normal                [facility] [place]  
   
                                        Comment on above:   Result Comment: Drug  
 screen results are presumptive and should  
   
not be used to assess  
compliance with prescribed medication. Definitive confirmatory   
drug testing  
has been added to this sample for any positive screen result and   
will be  
reported separately.  
.  
Toxicology screening results are reported qualitatively. The   
concentration  
must be greater than or equal to the cutoff to be reported as   
positive. The  
concentration at which the screening test can detect an   
individual drug or  
metabolite varies. The absence of expected drug(s) and/or drug   
metabolite(s)  
may indicate non-compliance, inappropriate timing of specimen   
collection  
relative to drug administration, poor drug absorption,   
diluted/adulterated  
urine, or limitations of testing. For medical purposes only; not   
valid for  
forensic use.  
.  
Interpretive questions should be directed to the laboratory   
medical  
directors.   
   
                                                            Performed By: #### D  
RUGR ####  
[facility]  
[address]  
[place] [address]   
   
                                                    FENTANYL   
SCREEN,URINE    Negative        Normal          NEGATIVE        [facility] [place]  
   
                                        Comment on above:   Result Comment: CUTO  
FF LEVEL: 5 NG/ML   
   
                                                            Performed By: #### D  
RUGR ####  
[facility]  
[address]  
[place] [address]   
   
                      METHADONE SCREEN,U Negative   Normal     NEGATIVE   [facility]  
   
                                        Comment on above:   Result Comment: CUTO  
FF LEVEL: 150 NG/ML  
The metabolite L-alpha-acetylmethadol (LAAM) is not  
detected by this method in concentrations that would  
be found in the urine of patients on LAAM therapy.   
   
                                                            Performed By: #### D  
RUGR ####  
[facility]  
[address]  
[place] [id number]   
   
                      OPIATES SCREEN,U Negative   Normal     NEGATIVE   [facility]  
   
                                        Comment on above:   Result Comment: CUTO  
FF LEVEL: 300 NG/ML  
The opiate screen does not detect fentanyl, meperidine, or  
tramadol. Oxycodone is not consistently detected (refer to  
Oxycodone Screen, Urine result).   
   
                                                            Performed By: #### D  
RUGR ####  
[facility]  
[address]  
[place] [id number]   
   
                      OXYCODONE SCREEN,U Negative   Normal     NEGATIVE   [facility]  
   
                                        Comment on above:   Result Comment: CUTO  
FF LEVEL: 100 NG/ML  
This test will accurately detect both oxycodone and oxymorphone.   
   
                                                            Performed By: #### D  
RUGR ####  
[facility]  
[address]  
[place] [id number]   
   
                      PCP SCREEN,U Negative   Normal     NEGATIVE   [facility] [place]  
   
                                        Comment on above:   Result Comment: CUTO  
FF LEVEL: 25 NG/ML  
Cross-reactivity has been reported with dextromethorphan.   
   
                                                            Performed By: #### D  
RUGR ####  
[facility]  
[address]  
[place] [address]   
   
                                                    Laboratory - Chemistry and C  
hemistry - challengeon 2022   
   
                                                    Anion gap   
[Moles/Vol]     15 mmol/L                       10 - 20         [facility]   
[facility]  
Work Phone:   
[phone]  
   
                      Calcium [Mass/Vol] 9.2 mg/dL             8.6 - 10.6 [facility]   
Med  
Work Phone:   
[phone]  
   
                      Chloride [Moles/Vol] 106 mmol/L            98 - 107   [facility]   
Med  
Work Phone:   
[phone]  
   
                      CO2 [Moles/Vol] 24 mmol/L             21 - 32    [facility]   
Med  
Work Phone:   
[phone]  
   
                                                    Creatinine   
[Mass/Vol]      0.85 mg/dL                      See Below       [facility]   
[facility]  
Work Phone:   
[phone]  
   
                                        Comment on above:   Reference Range: 0.5  
0 - 1.30   
   
                      Glucose [Mass/Vol] 80 mg/dL              74 - 99    [facility]   
Med  
Work Phone:   
[phone]  
   
                                                    Potassium   
[Moles/Vol]     4.6 mmol/L                      3.5 - 5.3       [facility]   
Med  
Work Phone:   
[phone]  
   
                      Sodium [Moles/Vol] 140 mmol/L            136 - 145  [facility]   
Med  
Work Phone:   
[phone]  
   
                                                    Urea nitrogen   
[Mass/Vol]                24 mg/dL                  above high   
threshold                 6 - 23                    [facility]   
[facility]  
Work Phone:   
[phone]  
   
                                                    Laboratory - Coagulationon 1  
2022   
   
                                                    aPTT Coag (PPP)   
[Time]          33 s                            26 - 39         [facility]   
[facility]  
Work Phone:   
[phone]  
   
                                        Comment on above:   THE APTT IS NO LONGE  
R USED FOR MONITORING UNFRACTIONATED   
HEPARIN   
THERAPY. FOR MONITORING HEPARIN THERAPY, USE THE HEPARIN ASSAY.   
   
                                                    INR Coag (PPP)   
[Relative time]           1.3 {INR}                 above high   
threshold                 0.9 - 1.1                 [facility]   
[facility]  
Work Phone:   
[phone]  
   
                                PT Coag (PPP) [Time] 15.0 s          above high   
threshold                 9.8 - 13.4                [facility]   
[facility]  
Work Phone:   
[phone]  
   
                                                    Laboratory - Hematology and   
Cell countson 2022   
   
                                                    Erythrocyte   
distribution width   
(RBC) [Ratio]   13.3 %                          See Below       [facility]   
[facility]  
Work Phone:   
[phone]  
   
                                        Comment on above:   Reference Range: 11.  
5 - 14.5   
   
                                                    Hematocrit (Bld)   
[Volume fraction] 43.9 %                          See Below       [facility]   
[facility]  
Work Phone:   
[phone]  
   
                                        Comment on above:   Reference Range: 41.  
0 - 52.0   
   
                                                    Hemoglobin (Bld)   
[Mass/Vol]      14.8 g/dL                       See Below       [facility]   
[facility]  
Work Phone:   
[phone]  
   
                                        Comment on above:   Reference Range: 13.  
5 - 17.5   
   
                                                    MCHC (RBC)   
[Mass/Vol]      33.7 g/dL                       See Below       [facility]   
[facility]  
Work Phone:   
[phone]  
   
                                        Comment on above:   Reference Range: 32.  
0 - 36.0   
   
                                                    MCV (RBC) [Entitic   
vol]                      104 fL                    above high   
threshold                 80 - 100                  [facility]   
[facility]  
Work Phone:   
[phone]  
   
                                                    Platelets (Bld)   
[#/Vol]         168 10*3/uL                     150 - 450       [facility]   
[facility]  
Work Phone:   
[phone]  
   
                                RBC (Bld) [#/Vol] 4.24 {x10E12/L} below low   
threshold                 See Below                 [facility]   
Med  
Work Phone:   
[phone]  
   
                                        Comment on above:   Reference Range: 4.5  
0 - 5.90   
   
                      WBC (Bld) [#/Vol] 7.1 10*3/uL            4.4 - 11.3 [facility]   
[facility]   
Med  
Work Phone:   
[phone]  
   
                                                    No Panel Informationon    
   
                                 88 {mL/min/1.73m2}            >90        [facility]   
Med  
Work Phone:   
[phone]  
   
                                        Comment on above:   CALCULATIONS OF PASCUAL  
MATED GFR ARE PERFORMED USING THE    
CKD-EPI STUDY REFIT EQUATION WITHOUT THE RACE VARIABLE FOR THE   
IDMS-TRACEABLE CREATININE   
METHODS.https://jasn.asnjournals.org/content/early//ASN  
.3123719864   
   
                                 0.0 {/100_WBC}            0.0-0.0    [facility]   
[facility]  
Work Phone:   
[phone]  
   
                                 Canceled                         [facility]   
[facility]  
Work Phone:   
[phone]  
   
                                        Comment on above:   Drug screen results   
are presumptive and should not be used to   
assess compliance with prescribed medication. Contact the   
performing [facility] laboratory to add-on definitive confirmatory   
testing if clinically indicated. .Toxicology screening results   
are reported qualitatively. The concentration must be greater   
than or equal to the cutoff to be reported as positive. The   
concentration at which the screening test can detect an   
individual drug or metabolite varies. The absence of expected   
drug(s) and/or drug metabolite(s) may indicate non-compliance,   
inappropriate timing of specimen collection relative to drug   
administration, poor drug absorption, diluted/adulterated urine,   
or limitations of testing. For medical purposes only; not valid   
for forensic use. .Interpretive questions should be directed to   
the laboratory medical directors.   
   
                                                    STAPH/MRSA SCREENon 20   
   
                                        STAPH/MRSA SCREEN   PATIENT: ENMANUEL ARRINGTON  
   
MRN: [id number] LOCATION:   
JANET SHETTY#: [id number] :   
[birth year] AGE: SEX: M  
  
ORDER#: [id number]   
ORDERED BY: DONNA HERNANDES  
SOURCE: ANTERIOR NARES   
COLLECTED: 22   
16:12  
ANTIBIOTICS AT NELIA.:   
RECEIVED : 22   
17:44  
SITE: Nasal  
  
R E S U L T S  
  
STAPH/MRSA SCREEN FINAL   
22 09:40  
  
NO Staphylococcus aureus   
ISOLATED.           Normal                                  [facility] [place]  
   
                                        Comment on above:   Performed By: #### S  
TAPH ####  
[facility]  
[address]  
[place] [address]   
   
                                                    Blood Pressure Cuff Sizeon 0  
2022   
   
                                                    Adult depression   
screening assessment No                                              [facility]  
Work Phone:   
[phone]  
   
                                        Fall risk assessment a) No falls within   
the   
last year                                                   [facility]  
[place]  
Work Phone:   
[phone]  
   
                                                    Tobacco use status   
[facility]            a) Yes                                          [facility]  
Work Phone:   
[phone]  
   
                                                    Blood Pressure Cuff   
Size            Large                                           [facility]  
[facility]  
Work Phone:   
[phone]  
   
                                                    Office Visit (Cardiology)on   
2022   
   
                                        Follow-up visit     Diagnoses/Problems  
Assessed  
Atrial fibrillation   
(427.31) (I48.91)  
Nonischemic   
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   
(I42.8)  
Chief Complaint  
ENMANUEL ARRINGTON is being seen   
for a cardiovascular   
evaluation of atrial   
fibrillation.  
  
Adult Risk Screening  
There are no   
spiritual/cultural   
practices/values/needs   
that are important to   
know  
Initial Fall Risk   
Screening:  
ENMANUEL has not fallen in   
the last 6 months. His   
fall did not result in   
injury. ENMANUEL does not   
have a fear of falling.   
He does not need   
assistance with sitting,   
standing or walking.   
Does not need assistance   
walking in his home. He   
does not need assistance   
in an unfamiliar   
setting. The patient is   
not using an assistive   
device.  
Pain Scale: On a scale   
of 0 to 10, the patient   
rates the pain at 0.  
  
Living Will.  
Living Will: No living   
will on file.  
Healthcare POA: No   
healthcare proxy on   
file.  
Declaration of Mental   
Health Treatment: No   
mental health treatment   
on file.  
Tobacco Screening: ENMANUEL   
does not use tobacco.  
Domestic Violence   
Screen: Does not feel   
threatened or abused   
physically, emotionally   
or sexually. Do you feel   
UNSAFE?  
The patient feels safe   
in the home.  
Depression/Suicide   
Screening:  
He does not have a risk   
of suicide.  
He has not had thoughts   
of harming others.  
Nutrition Screening:  
In the past month, there   
was not a day when I or   
anyone in my family went   
hungry because there was   
not enough food.  
  
History of Present   
Illness  
Enmanuel Arrington is a 75 y/o   
male referred by Dr Murphy for evaluation   
on AF.  
PMH includes NICM, R   
should rotator cuff   
injury, cocaine abuse x   
17 yrs (rehab in [place] now   
sober), oxycodone abuse   
(started in ) w/   
accidental overdose and   
AF.  
Treatment of his AF   
includes carvedilol. Pt   
has not required DCCV or   
AAD?s.  
Symptoms of his AF   
include palpitations and   
DELEON.  
Pt recently followed up   
with his PCP in the end   
of April/early May and   
pt was noted to be in   
AF. Pt had a history of   
irregular heartbeat but   
no diagnosis of AF. Pt   
was started on Eliquis.  
Pt established with Dr Murphy with the plan   
for DCCV. His DCCV was   
cancelled because pt had   
COVID. Pt is not on ay   
medications for rate   
control. Pt has stopped   
all his medications   
almost 1 week ago   
including his Eliquis.   
pt has a history of   
Oxycodone abuse which he   
was started on initially   
in  for a rotator   
cuff injury. Pt has been   
obtaining the   
medications on the   
street not through   
prescription. Pt has a   
recent accidental   
overdose which required   
ED visit an reversal   
with Narcan. Pt was take   
to rehab by his daughter   
about 1 week ago but   
left the same day.  
Pt reports that he has   
been sober for 6 days.   
Pt presents today to   
discuss AF ablation.  
Echo 2022: EF 40-45%,   
septal akinesis, LA   
mildly dilated  
Stress test 2022: no   
ischemia or myocardial   
infarction  
  
Active Problems  
Problems  
Atrial fibrillation   
(427.31) (I48.91)  
Dyspnea on exertion   
(786.09) (R06.09)  
Former smoker (V15.82)   
(Z87.891)  
quit somking at age 34  
Nonischemic   
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   
(I42.8)  
Overweight with body   
mass index (BMI) of 25   
to 25.9 in adult   
(278.02,V85.21)  
(E66.3,Z68.25)  
Surgical History  
Problems  
History of Middle ear   
surgery  
History of Oral surgery  
History of Tonsillectomy  
Current Meds  
  
Medication   
NameInstruction  
Carvedilol 6.25 MG Oral   
TabletTAKE 0.5 TABLET   
Twice daily  
Eliquis 5 MG Oral   
TabletTake 1 tablet   
twice daily  
Eplerenone 25 MG Oral   
TabletTAKE 1 TABLET   
DAILY.  
Losartan Potassium 25 MG   
Oral TabletTAKE 1 TABLET   
DAILY AS DIRECTED.  
Protonix 40 MG Oral   
Tablet Delayed   
ReleaseTAKE 1 TABLET   
DAILY.  
Allergies  
Medication  
No Known Drug Allergies  
Recorded By: Almita Alford; 2022   
10:01:43 AM  
Family History  
Mother  
Family history of lung   
cancer (V16.1) (Z80.1)  
Father  
Family history of   
cerebrovascular accident   
(CVA) (V17.1) (Z82.3)  
FH: CABG (coronary   
artery bypass surgery)   
(V17.3) (Z82.49)  
Sibling  
Family history of lung   
cancer (V16.1) (Z80.1)  
Social History  
Problems  
Consumes alcohol   
occasionally (V49.89)   
(Z78.9)  
Daily caffeine   
consumption  
2 cups of coffee daily,   
pop three times weekly  
Former smoker (V15.82)   
(Z87.891)  
quit somking at age 34  
Illicit drug use   
(305.90) (F19.90)  
oxycodone  
Review of Systems  
Constitutional: not   
feeling tired and not   
feeling poorly.  
Eyes: no eyesight   
problems.  
ENT: no hearing loss and   
no nosebleeds.  
Cardiovascular: no   
intermittent leg   
claudication, no chest   
pain, no tightness or   
heavy pressure, no   
shortness of breath, no   
palpitations and as   
noted in HPI.  
Respiratory: no chronic   
cough, no shortness of   
breath, no shortness of   
breath during exertion   
and no orthopnea.  
Gastrointestinal: no   
change in bowel habits   
and no blood in stools.  
Genitourinary: no   
urinary frequency and no   
hematuria.  
Skin: no skin rashes.  
Neurological: no   
seizures, no frequent   
falls, no dizziness and   
no fainting.  
Psychiatric: no   
depression and not   
suicidal.  
Endocrine: no thyroid   
disorder and no diabetes   
mellitus.  
All other systems have   
been reviewed and are   
negative for com (more   
content not included)... Normal                                   [facility]  
   
                                                    DRUG SCREEN RAPID (URINE)on   
2022   
   
                      AMP        Negative   Normal     NEGATIVE   [facility]  
   
                                        Comment on above:   Performed By: #### D  
HAILEY ERUR ####  
[facility]  
[address] [place] [address]  
Dr. Sam Gonzalez   
   
                      BAR        Negative   Normal     NEGATIVE   [facility]  
   
                                        Comment on above:   Performed By: #### D  
RUGRPD, ERUR ####  
[facility]  
[address] [place] [address]  
Dr. Sam Gonzalez   
   
                      BUP        Negative   Normal     NEGATIVE   [facility]  
   
                                        Comment on above:   Performed By: #### D  
RUGRPD, ERUR ####  
[facility]  
[address] [place] [address]  
Dr. Sam Gonzalez   
   
                      BZO        Negative   Normal     NEGATIVE   [facility]  
   
                                        Comment on above:   Performed By: #### D  
RUGRPD, ERUR ####  
[facility]  
[address] [place] [address]  
Dr. Sam Gonzalez   
   
                      OLIVIER        Negative   Normal     NEGATIVE   [facility]  
   
                                        Comment on above:   Performed By: #### D  
DOMINIKD, ERUR ####  
[facility]  
[address] [place] [address]  
Dr. Sam Gonzalez   
   
                      CUT-OFFS   SEE BELOW  Normal                [facility]  
   
                                        Comment on above:   Result Comment: AMP   
(Amphetamine): 500ng/mL, BAR   
(Barbituates):   
200 ng/mL, BZO (Benzodiazepines): 150 ng/mL, BUP (Buprenorphine):   
10 ng/mL, OLIVIER (Cocaine): 150 ng/mL, mAMP (Methamphetamine): 500   
ng/mL, MTD (Methadone): 200 ng/mL, OPI (Opiates): 100 ng/mL, OXY   
(Oxycodone): 100 ng/mL, PCP (Phencyclidine): 25 ng/mL, PPX   
(Propoxyphene): 300 ng/mL, THC (Cannabinoids): 50 ng/mL, TCA   
(Trycyclic Antidepressants): 300 ng/mL   
   
                                                            Performed By: #### D  
DOMINIKD, ERUR ####  
[facility]  
[address] [place] [address]  
Dr. Sam Gonzalez   
   
                                        DRUG CUT HEADER     DRUG CLASS TEST SYST  
EM   
CUT-OFF CONCENTRATIONS   
ARE AS FOLLOWS:     Normal                                  [facility]  
   
                                        Comment on above:   Performed By: #### D  
RUGRPD, ERUR ####  
[facility]  
[address] [place], [place] [id number]  
Dr. Sam Gonzalez   
   
                      mAMP       Negative   Normal     NEGATIVE   [facility]  
   
                                        Comment on above:   Performed By: #### D  
RUGRPD, ERUR ####  
[facility]  
[address] [address]  
Dr. Sam Gonzalez   
   
                      MTD        Negative   Normal     NEGATIVE   [facility]  
   
                                        Comment on above:   Performed By: #### D  
RUGRPD, ERUR ####  
[facility]  
[address] [address]  
Dr. Sam Gonzalez   
   
                      OPI        Negative   Normal     NEGATIVE   [facility]  
   
                                        Comment on above:   Performed By: #### D  
RUGRPD, ERUR ####  
[facility]  
[address] [address]  
Dr. Sam Gonzalez   
   
                      OXY        Positive   Abnormal   NEGATIVE   [facility]  
   
                                        Comment on above:   Performed By: #### D  
RUGRPD, ERUR ####  
[facility]  
[address] [place] [address]  
Dr. Sam Gonzalez   
   
                      PCP        Negative   Normal     NEGATIVE   [facility]  
   
                                        Comment on above:   Performed By: #### D  
RUGRPD, ERUR ####  
[facility]  
[address] [place] [address]  
Dr. Sam Gonzalez   
   
                      PPX        Negative   Normal     NEGATIVE   [facility]  
   
                                        Comment on above:   Performed By: #### D  
RUGRPD, ERUR ####  
[facility]  
[address] [place] [address]  
Dr. Sam Gonzalez   
   
                      TCA        Negative   Normal     NEGATIVE   [facility]  
   
                                        Comment on above:   Performed By: #### D  
RUGRPD, ERUR ####  
[facility]  
[address] [place] [address]  
Dr. Sam Gonzalez   
   
                      THC        Negative   Normal     NEGATIVE   [facility]  
   
                                        Comment on above:   Performed By: #### D  
RUGRPD, ERUR ####  
[facility]  
[address] [place] [address]  
Dr. Sam Gonzalez   
   
                                                    ER URINE PROFILEon   
2   
   
                      Bilirubin Ql (U) Negative   Normal     NEGATIVE   [facility]  
   
                                        Comment on above:   Performed By: #### D  
RUGRPD, ERUR ####  
[facility]  
[address] [place] [address]  
Dr. Sam Gonzalez   
   
                      Clarity (U) CLEAR      Normal     CLEAR      [facility]  
   
                                        Comment on above:   Performed By: #### D  
HAILEY, ERUR ####  
[facility]  
[address] [address]  
Dr. Sam Gonzalez   
   
                      Color (U)  YELLOW     Normal     YELLOW     [facility]  
   
                                        Comment on above:   Performed By: #### D  
HAILEY, ERUR ####  
[facility]  
[address] [address]  
Dr. Sam BUTTERFIELDAHBRE              A micrscopic examina  
tion   
will be performed if   
indicated.          Normal                                  [facility]  
   
                                        Comment on above:   Performed By: #### D  
HAILEY, ERUR ####  
[facility]  
[address] [address]  
Dr. Sam Gonzalez   
   
                      Glucose Ql (U) Negative   Normal     NEGATIVE   [facility]  
   
                                        Comment on above:   Performed By: #### D  
HAILEY, ERUR ####  
[facility]  
[address] [place] [address]  
Dr. Sam Gonzalez   
   
                      Hemoglobin Ql (U) Negative   Normal     NEGATIVE   [facility]  
   
                                        Comment on above:   Performed By: #### D  
HAILEY, ERUR ####  
[facility]  
[address] [place] [address]  
Dr. Sam Gonzalez   
   
                      Ketones Ql (U) Negative   Normal     NEGATIVE   [facility]  
   
                                        Comment on above:   Performed By: #### D  
HAILEY, ERUR ####  
[facility]  
[address] [place] [address]  
Dr. Sam Gonzalez   
   
                      LEUKOCYTES Negative   Normal     NEGATIVE   [facility]  
   
                                        Comment on above:   Performed By: #### D  
HAILEY, ERUR ####  
[facility]  
[address] [place] [address]  
Dr. Sam Gonzalez   
   
                      Nitrite Ql (U) Negative   Normal     NEGATIVE   [facility]  
   
                                        Comment on above:   Performed By: #### BRE HART, ERUR ####  
[facility]  
[address] [address]  
Dr. Sam Gonzalez   
   
                      pH (U)     5.5 [pH]   Normal     5-9        [facility]  
   
                                        Comment on above:   Performed By: #### D  
HAILEY, ERUR ####  
[facility]  
[address] [place] [address]  
Dr. Sam Gonzalez   
   
                          SPEC GRAVITY 1.025        Normal       1.005-<=1.0  
25                                      [facility]  
   
                                        Comment on above:   Performed By: #### D  
HAILEY, ERUR ####  
[facility]  
[address] [place] [address]  
Dr. Sam Gonzalez   
   
                          UA PROTEIN   Negative     Normal       NEGATIVE/   
TRACE                                   [facility]  
   
                                        Comment on above:   Performed By: #### D  
HAILEY, ERUR ####  
[facility]  
[address] [place] [address]  
Dr. Sma Gonzalez   
   
                      UR MICRO IND NOT INDICATED Normal                [facility]  
   
                                        Comment on above:   Performed By: #### D  
HAILEY, ERUR ####  
[facility]  
[address] [place] [address]  
Dr. Sam Gonzalez   
   
                      Urobilinogen Qn (U) 0.2 {Frederic'U}/dL Normal     0.2 - 1.  
0  [facility]  
   
                                        Comment on above:   Performed By: #### D  
HAILEY, ERUR ####  
[facility]  
[address] [place] [address]  
Dr. Sam Gonzalez   
   
                                                    XR CHEST 1 Von 2022   
   
                                        XR CHEST 1 V        CLINICAL HISTORY:   
Altered mental status.  
FINDINGS: Portable AP   
view of the chest   
obtained.   
Cardiomediastinal   
silhouette  
is normal. Lungs are   
clear, no evidence of   
infiltrate, suspicious   
nodule, or  
mass. No evidence of   
significant pleural   
fluid on this portable   
projection. No  
acute bony abnormality.  
IMPRESSION:  
No acute abnormality.  
Electronically   
authenticated by:   
SURENDRA MANCERA Date:   
2022 22:07    Normal                                  [facility]  
   
                                                    ACETONE SERUMon 2022   
   
                      ACETONE    Negative   Normal     NEGATIVE   [facility]  
   
                                        Comment on above:   Performed By: #### D  
HAILEY, ERUR ####  
[facility]  
[address] [place] [address]  
Dr. Sam Gonzalez   
   
                                                    AMMONIAon 2022   
   
                                                    Ammonia (P)   
[Moles/Vol]     46 umol/L       Critically high 11-32           [facility]  
   
                                        Comment on above:   Performed By: #### D  
HAILEY, ERUR ####  
[facility]  
[address] [place] [address]  
Dr. Sam Gonzalez   
   
                                                    CBC AUTO DIFFon 2022   
   
                      BASO #     0.1 103/ul Normal     0.0-0.1    [facility]  
   
                                        Comment on above:   Performed By: #### D  
HAILEY, ERUR ####  
[facility]  
[address] [place] [address]  
Dr. Sam Gonzalez   
   
                                                    Basophils/100 WBC   
(Bld)           1.0 %           Normal          0.2-2.0         [facility]  
   
                                        Comment on above:   Performed By: #### D  
HAILEY, ERUR ####  
[facility]  
[address] [place] [address]  
Dr. Sam Gonzalez   
   
                      EO #       0.6 103/ul Normal     0.0-0.7    [facility]  
   
                                        Comment on above:   Performed By: #### D  
HAILEY, ERUR ####  
[facility]  
[address] [place] [address]  
Dr. Sam Gonzalez   
   
                                                    Eosinophils/100 WBC   
(Bld)           9.4 %           Critically high 0.9-7.0         [facility]  
   
                                        Comment on above:   Performed By: #### D  
HAILEY, ERUR ####  
[facility]  
[address] [place] [address]  
Dr. Sam Gonzalez   
   
                                                    Erythrocyte   
distribution width   
(RBC) [Ratio]   13.4 %          Normal          11.0-15.0       [facility]  
   
                                        Comment on above:   Performed By: #### BRE HART, ERUR ####  
[facility]  
[address] [place] [address]  
Dr. Sam Gonzalez   
   
                                                    Hematocrit (Bld)   
[Volume fraction] 38.0 %          Critically low  42.0-54.0       [facility]  
   
                                        Comment on above:   Performed By: #### D  
HAILEY, ERUR ####  
[facility]  
[address] [place] [address]  
Dr. Sam Gonzalez   
   
                                                    Hemoglobin (Bld)   
[Mass/Vol]      12.8 g/dL       Critically low  14.0-18.0       [facility]  
   
                                        Comment on above:   Performed By: #### D  
HAILEY, ERUR ####  
[facility]  
[address] [place] [address]  
Dr. Sam Gonzalez   
   
                      IG #       0.01 10e3/ul Normal     0.00-0.03  [facility]  
   
                                        Comment on above:   Performed By: #### D  
HAILEY, ERUR ####  
[facility]  
[address] [place] [address]  
Dr. Sam Gonzalez   
   
                      IG %       0.1 %      Normal     0.0-0.5    [facility]  
   
                                        Comment on above:   Performed By: #### D  
HAILEY, ERUR ####  
[facility]  
[address] [place] [address]  
Dr. Sam Gonzalez   
   
                      LYMPH #    2.2 103/ul Normal     1.2-3.8    [facility]  
   
                                        Comment on above:   Performed By: #### D  
HAILEY, ERUR ####  
[facility]  
[address] [place] [address]  
Dr. Sam Gonzalez   
   
                                                    Lymphocytes/100 WBC   
(Bld)           32.1 %          Normal          20.5-60.0       [facility]  
   
                                        Comment on above:   Performed By: #### D  
HAILEY, ERUR ####  
[facility]  
[address] [place] [address]  
Dr. Sam Gonzalez   
   
                      MANUAL DIFF REQ NO         Normal                [facility]  
   
                                        Comment on above:   Performed By: #### D  
HAILEY ERUR ####  
[facility]  
[address] [place] [address]  
Dr. Sam Gonzalez   
   
                                                    MCH (RBC) [Entitic   
mass]           33.8 pg         Normal          25.9-34.0       [facility]  
   
                                        Comment on above:   Performed By: #### D  
HAILEY, ERUR ####  
[facility]  
[address] [place] [address]  
Dr. Sam Gonzalez   
   
                                                    MCHC (RBC)   
[Mass/Vol]      33.7 g/dL       Normal          29.9-35.2       [facility]  
   
                                        Comment on above:   Performed By: #### D  
HAILEY, ERUR ####  
[facility]  
[address] [place] [address]  
Dr. Sam Gonzalez   
   
                                                    MCV (RBC) [Entitic   
vol]            100.3 fL        Critically high 80.0-94.0       [facility]  
   
                                        Comment on above:   Performed By: #### BRE HART, ERUR ####  
[facility]  
[address] [address]  
Dr. Sam Gonzalez   
   
                      MONO #     0.8 103/ul Normal     0.3-0.8    [facility]  
   
                                        Comment on above:   Performed By: #### D  
HAILEY, ERUR ####  
[facility]  
[address] [address]  
Dr. Sam Gonzalez   
   
                                                    Monocytes/100 WBC   
(Bld)           11.9 %          Normal          1.7-12.0        [facility]  
   
                                        Comment on above:   Performed By: #### D  
HAILEY, ERUR ####  
[facility]  
[address] [address]  
Dr. Sam Gonzalez   
   
                      NEUT #     3.1 103/ul Normal     1.4-6.5    [facility]  
   
                                        Comment on above:   Performed By: #### D  
HAILEY, ERUR ####  
[facility]  
[address] [place] [address]  
Dr. Sam Gonzalez   
   
                                                    Neutrophils/100 WBC   
(Bld)           45.5 %          Normal          43.0-75.0       [facility]  
   
                                        Comment on above:   Performed By: #### D  
HAILEY, ERUR ####  
[facility]  
[address] [place] [address]  
Dr. Sam Gonzalez   
   
                                                    Platelet mean volume   
(Bld) [Entitic vol] 9.4 fL          Critically low  9.5-13.5        [facility]  
   
                                        Comment on above:   Performed By: #### D  
HAILEY, ERUR ####  
[facility]  
[address] [place] [address]  
Dr. Sam Gonzalez   
   
                      PLT        136 103/ul Critically low 150-450    [facility]  
   
                                        Comment on above:   Performed By: #### D  
HAILEY, ERUR ####  
[facility]  
[address] [place] [address]  
Dr. Sam Gonzalez   
   
                      RBC        3.79 106/ul Critically low 4.70-6.10  [facility]  
   
                                        Comment on above:   Performed By: #### D  
HAILEY, ERUR ####  
[facility]  
[address] [place] [address]  
Dr. Sam Gonzalez   
   
                      WBC        6.8 103/ul Normal     4.0-11.0   [facility]  
   
                                        Comment on above:   Performed By: #### D  
HAILEY, ERUR ####  
[facility]  
[address] [address]  
Dr. Sam Gonzalez   
   
                                                    CT HEAD WO CONon 2022   
   
                                        CT HEAD WO CON      EXAMINATION: CT HEAD  
 WO   
CON  
HISTORY: Altered mental   
status.  
TECHNIQUE: Axial CT   
scans through the head   
were obtained without IV   
contrast  
administration. Dose   
reduction techniques   
were achieved by using:   
automated  
exposure control and/or   
adjustment of mA and/or   
kV according to patient   
size  
and/or use of iterative   
reconstruction   
technique.  
COMPARISON: None.  
FINDINGS: Mild to   
moderate periventricular   
low attenuation in the   
cerebral  
hemispheres without   
associated mass effect.   
The brainstem and the   
cerebellum  
appear normal. The   
ventricular system and   
cortical sulci are   
prominent,  
secondary to cerebral   
volume loss. No area of   
abnormal mass effect,   
edema, or  
intracranial hemorrhage   
is shown. The visualized   
orbits appear normal.   
The  
visualized paranasal   
sinuses show no   
air-fluid level.   
Congenital wall down   
left  
mastoidectomy.  
IMPRESSION:  
1. 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   
microvascular ischemic   
change and age-related   
cerebral  
atrophy.  
2. No acute intracranial   
process.  
Electronically   
authenticated by: SOCRATES RICHARDS Date: 2022   
21:44               Normal                                  [facility]  
   
                                                    Covid-19 PCR (CVDTB)on 07-3  
0-2022   
   
                                                    SARS-CoV-2   
(COVID-19) RNA   
ARMANDO+probe Ql (Unsp   
spec)               Not detected        Normal              NOT   
DETECTED                                [facility]  
   
                                        Comment on above:   Result Comment: When  
 diagnostic testing is negative, the   
possibility of a false negative should be considered in  
the context of a patient's recent exposures and the presence of   
clinical signs and symptoms  
consistent with SARS-CoV-2.  
This test is not yet approved or cleared by the [place]   
FDA. When there are no FDA-approved or cleared tests available,   
and other criteria are met, FDA can make tests available under an   
emergency access mechanism called an Emergency Use Authorization   
(EUA). The EUA for this test is supported by the [place] of   
Health and Human Service's declaration that circumstances exist   
to justify the emergency use of in vitro diagnostics for the   
detection and/or diagnosis of the virus that causes COVID-19.   
This EUA will remain in effect for the duration of the COVID-19   
declaration justifying emergency of IVDs, unless it is terminated   
or revoked by the FDA (after which the test may no longer be   
used).   
   
                                                            Performed By: #### D  
HAILEY, ERUR ####  
[facility]  
[address] [place] [address]  
Dr. Sam Gonzalez   
   
                                                    ETHANOL (BLD ALC)on 20  
22   
   
                                        ALC NOTE            NOTE: 80 mg/dl is th  
e   
legal limit for a blood   
alcohol level       Normal                                  [facility]  
   
                                        Comment on above:   Performed By: #### D  
HAILEY, ERUR ####  
[facility]  
[address] [place] [address]  
Dr. Sam Gonzalez   
   
                      Ethanol [Mass/Vol] mg/dL      Normal                [facility]  
   
                                        Comment on above:   Performed By: #### D  
HAILEY, ERUR ####  
[facility]  
[address] [place] [address]  
Dr. Sam Gonzalez   
   
                                                    FREE T3on 2022   
   
                      FREE T3    3.08 pg/mlL Normal     2.18-3.98  [facility]  
   
                                        Comment on above:   Performed By: #### F  
T3 ####  
[facility]  
[address] [place] [address]  
Dr. Sam Gonzalez   
   
                                                    FREE T4on 2022   
   
                      Free T4 [Mass/Vol] 1.11 ng/dL Normal     0.76-1.46  [facility]  
   
                                        Comment on above:   Performed By: #### D  
HAILEY, ERUR ####  
[facility]  
[address] [place] [address]  
Dr. Sam Gonzalez   
   
                                                    LACTATE/LACTIC ACIDon 2022   
   
                      Lactate [Moles/Vol] 0.8 mmol/L Normal     0.4-1.9    [facility]  
   
                                        Comment on above:   Performed By: #### D  
HAILEY, ERUR ####  
[facility]  
[address] [place] [address]  
Dr. Sam Gonzalez   
   
                                                    PH VENOUS BLOODon 2022  
   
   
                      PCO2 VENOUS 48.5 mmHg  Normal     40.0-52.0  [facility]  
   
                                        Comment on above:   Performed By: #### D  
HAILEY, ERUR ####  
[facility]  
[address] [place] [address]  
Dr. Sam Gonzalez   
   
                      pH VENOUS  7.347      Normal     7.330-7.430 [facility]  
   
                                        Comment on above:   Performed By: #### D  
HAILEY, ERUR ####  
[facility]  
[address] [address]  
Dr. Sam Gonzalez   
   
                                                    PROF 14(COMP METB)on   
022   
   
                      Albumin [Mass/Vol] 3.7 g/dL   Normal     3.4-5.0    [facility]  
   
                                        Comment on above:   Performed By: #### D  
HAILEY, ERUR ####  
[facility]  
[address] [address]  
Dr. Sam Gonzalez   
   
                                                    Albumin/Globulin   
[Mass ratio]    1.2 {ratio}     Normal                          [facility]  
   
                                        Comment on above:   Performed By: #### D  
HAILEY, ERUR ####  
[facility]  
[address] [place] [address]  
Dr. Sam Gonzalez   
   
                                                    ALP [Catalytic   
activity/Vol]   78 U/L          Normal                    [facility]  
   
                                        Comment on above:   Performed By: #### BRE HART, ERUR ####  
[facility]  
[address] [place] [address]  
Dr. Sam Gonzalez   
   
                                                    ALT [Catalytic   
activity/Vol]   26 U/L          Normal          16-63           [facility]  
   
                                        Comment on above:   Performed By: #### D  
HAILEY, ERUR ####  
[facility]  
[address] [place] [address]  
Dr. Sam Gonzalez   
   
                                                    Anion gap   
[Moles/Vol]     11.1 mmol/L     Normal                          [facility]  
   
                                        Comment on above:   Performed By: #### BRE HART, ERUR ####  
[facility]  
[address] [place] [address]  
Dr. Sam Gonzalez   
   
                                                    AST [Catalytic   
activity/Vol]   23 U/L          Normal          15-37           [facility]  
   
                                        Comment on above:   Performed By: #### D  
HAILEY, ERUR ####  
[facility]  
[address] [place] [address]  
Dr. Sam Gonzalez   
   
                      Bilirubin [Mass/Vol] 1.1 mg/dL  Critically high 0.2-1.0     
 [facility]  
   
                                        Comment on above:   Performed By: #### BRE HART, ERUR ####  
[facility]  
[address] [place] [address]  
Dr. Sam Gonzalez   
   
                      Calcium [Mass/Vol] 8.6 mg/dL  Normal     8.5-10.1   [facility]  
   
                                        Comment on above:   Performed By: #### D  
HAILEY, ERUR ####  
[facility]  
[address] [address]  
Dr. Sam Gonzalez   
   
                      Chloride [Moles/Vol] 106 mmol/L Normal          [facility]   
[facility]  
   
                                        Comment on above:   Performed By: #### D  
HAILEY, ERUR ####  
[facility]  
[address] [place] [address]  
Dr. Sam Gonzalez   
   
                      CO2 [Moles/Vol] 27.7 mmol/L Normal     21.0-32.0  [facility]  
   
                                        Comment on above:   Performed By: #### D  
HAILEY, ERUR ####  
[facility]  
[address] [place] [address]  
Dr. Sam Gonzalez   
   
                                                    Creatinine   
[Mass/Vol]      1.05 mg/dL      Normal          0.70-1.30       [facility]  
   
                                        Comment on above:   Performed By: #### BRE HART, ERUR ####  
[facility]  
[address] [place] [address]  
Dr. Sam Gonzalez   
   
                      EGFR-AF AMERICAN >60        Normal     >=60       [facility]  
   
                                        Comment on above:   Performed By: #### BRE HART, ERUR ####  
[facility]  
[address] [place] [address]  
Dr. Sam Gonzalez   
   
                      EGFR-NON AF AMERICAN >60        Normal     >=60       The   
[facility]  
   
                                        Comment on above:   Performed By: #### D  
HAILEY, ERUR ####  
[facility]  
[address] [place] [address]  
Dr. Sam Gonzalez   
   
                                                    Globulin (S)   
[Mass/Vol]      3.0 g/dL        Normal                          [facility]  
   
                                        Comment on above:   Performed By: #### D  
HAILEY, ERUR ####  
[facility]  
[address] [place] [address]  
Dr. Sam Gonzalez   
   
                      Glucose [Mass/Vol] 83 mg/dL   Normal          [facility]  
   
                                        Comment on above:   Performed By: #### D  
HAILEY, ERUR ####  
[facility]  
[address] [address]  
Dr. Sam Gonzalez   
   
                                                    Potassium   
[Moles/Vol]     4.8 mmol/L      Normal          3.5-5.1         [facility]  
   
                                        Comment on above:   Performed By: #### D  
HAILEY, ERUR ####  
[facility]  
[address] [address]  
Dr. Sam Gonzalez   
   
                      Protein [Mass/Vol] 6.7 g/dL   Normal     6.4-8.2    [facility]  
   
                                        Comment on above:   Performed By: #### D  
HAILEY, ERUR ####  
[facility]  
[address] [address]  
Dr. Sam Gonzalez   
   
                      Sodium [Moles/Vol] 140 mmol/L Normal     136-145    [facility]  
   
                                        Comment on above:   Performed By: #### D  
HAILEY, ERUR ####  
[facility]  
[address] [place] [address]  
Dr. Sam Gonzalez   
   
                                                    Urea nitrogen   
[Mass/Vol]      22.0 mg/dL      Critically high 7.0-18.0        [facility]  
   
                                        Comment on above:   Performed By: #### D  
HAILEY, ERUR ####  
[facility]  
[address] [address]  
Dr. Sam Gonzalez   
   
                                                    Urea   
nitrogen/Creatinine   
[Mass ratio]    21.0 mg/mg      Normal                          [facility]  
   
                                        Comment on above:   Performed By: #### D  
HAILEY, ERUR ####  
[facility]  
[address] [place] [address]  
Dr. aSm Gonzalez   
   
                                                    PROTIMEon 2022   
   
                                                    INR Coag (PPP)   
[Relative time] 1.15 {INR}      Normal                          [facility]  
   
                                        Comment on above:   Performed By: #### D  
HAILEY, ERUR ####  
[facility]  
[address] [place] [address]  
Dr. Sam Gonzalez   
   
                      INR GUIDELINES SEE BELOW  Normal                [facility]  
   
                                        Comment on above:   Result Comment: SUSAN  
RED INR: 2.0 - 3.0 CONDITIONS NOT LISTED   
BELOW 2.5 - 3.5 FOR PROSTHETIC HEART VALVE REPLACEMENT 2.5 - 3.5   
RECURRENT THROMBOSIS   
   
                                                            Performed By: #### D  
HAILEY, ERUR ####  
[facility]  
[address] [address]  
Dr. Sam Gonzalez   
   
                      PT Coag (PPP) [Time] 12.3 s     Critically high 9.0-11.6    
 [facility]  
   
                                        Comment on above:   Performed By: #### D  
HAILEY, ERUR ####  
[facility]  
[address] [address]  
Dr. Sam Gonzalez   
   
                                                    PTTon 2022   
   
                                                    aPTT Coag (Bld)   
[Time]          30.4 s          Normal          22.3-36.2       [facility]  
   
                                        Comment on above:   Performed By: #### D  
HAILEY, ERUR ####  
[facility]  
[address] [address]  
Dr. Sam Gonzalez   
   
                                                    TROPONIN, HIGH SENSITIVITYon  
 2022   
   
                      HSTROP     11.2 pg/mL Normal     4.0-76.1   [facility]  
   
                                        Comment on above:   Result Comment: CUT-  
OFF POINTS HAVE BEEN ESTABLISHED BASED ON   
THE   
FOURTH UNIVERSAL DEFINITIONS OF MYOCARDIAL  
INFARCTION. THE UPPER REFERENCE LIMIT (URL) OF TROPONIN, DEFINED   
AS THE 99TH PERCENTILE OF  
cTnI DISTRIBUTION IN A REFERENCE POPULATION, HAS BEEN CONFIRMED   
AS THE DECISION THRESHOLD  
FOR MI DIAGNOSIS.   
   
                                                            Performed By: #### D  
HAILEY, ERUR ####  
[facility]  
[address] [place] [address]  
Dr. Sam Gonzalez   
   
                                                    TSHon 2022   
   
                      TSH        5.294 uIU/mL Critically high 0.358-3.740 [facility]  
   
                                        Comment on above:   Performed By: #### D  
HAILEY, ERUR ####  
[facility]  
[address] [place] [address]  
Dr. Sam Gonzalez   
   
                                                    ECG 12 lead ECGon 2022  
   
   
                                        ECG 12 lead ECG     [facility] [place] [address]  
  
Electrocardiograph   
Report  
Signed  
  
Patient: Enmanuel Arrington   
MR#: [id number]  
  
: 1945   
Acct:[id number]  
  
Age/Sex: 76 / M ADM   
Date: 22  
  
Loc:  Room: Type: [facility]  
Attending Dr: LAURA Murphy DO  
  
Ordering Provider:   
George Leonard MD,   
[facility]  
Date of Service:   
  
Accession #:   
([id number]) ECG/ECG 12   
lead ECG:   
Pre-cardioversion rhythm   
assessment  
  
  
Copies to:  
  
  
Test Reason :  
Blood Pressure : ***/***   
mmHG  
Vent. Rate : 058 BPM   
Atrial Rate : 078 BPM  
P-R Int : 000 ms QRS Dur   
: 072 ms  
QT Int : 404 ms P-R-T   
Axes : 000 063 064   
degrees  
QTc Int : 396 ms  
  
Atrial fibrillation with   
slow ventricular   
response  
Low voltage QRS  
Abnormal ECG  
No previous ECGs   
available  
Confirmed by LUZ BENSON DO ([id number]) on   
2022 6:03:18 PM  
  
Referred By: George Leonard Electronically   
Signed By:LUZ BENSON DO  
  
  
  
Transcribed By: MUS  
Signed By Luz Benson DO  [id number]            Normal                                  [facility]  
   
                                                    Electrolyteson 2022   
   
                      Chloride [Moles/Vol] 101 mmol/L Normal          [facility]  
   
                                        Comment on above:   Performed By: #### L  
YTES ####  
[address]   
   
                      CO2 [Moles/Vol] 25.3 mmol/L Normal     22.0-30.0  [facility]  
   
                                        Comment on above:   Result Comment: PERF  
ORMED BY:  
[place] [address]  
[phone]  
PATHOLOGIST MEDICAL DIRECTOR  
NAKITA BLOUNT M.D.   
   
                                                            Performed By: #### L  
YTES ####  
[address]   
   
                                                    Potassium   
[Moles/Vol]     4.4 mmol/L      Normal          3.5-5.1         [facility]  
   
                                        Comment on above:   Performed By: #### L  
YTES ####  
[facility]  
[address] [place] [address]   
   
                      Sodium [Moles/Vol] 135 mmol/L Low        136-146    [facility]  
   
                                        Comment on above:   Performed By: #### L  
YTES ####  
[address]   
   
                                                    No Panel Informationon    
   
                                 25.3\S\25.3 Normal     22.0-30.0  [facility]   
250 DO  
Work Phone:   
[phone]  
   
                                        Comment on above:   PERFORMED BY:[facility] [place], [place] [phone] MEDICAL   
DIRECTORNAKITA BLOUNT M.D.   
   
                                 101\S\101  Normal          [facility]   
[facility] [facility]   
250 DO  
Work Phone:   
[phone]  
   
                                 4.4\S\4.4  Normal     3.5-5.1    [facility]   
[facility]   
250 DO  
Work Phone:   
[phone]  
   
                                                135\S\135       below low   
threshold                 136-146                   [facility]   
[facility]   
250 DO  
Work Phone:   
[phone]  
   
                                                    Serum or plasma chloride mireille  
surement (moles/volume)Ordered By: George Leonard on  
   
2022   
   
                      Chloride [Moles/Vol] 101 mmol/L                 [facility]  
   
                                                    Serum or plasma potassium me  
asurement (moles/volume)Ordered By: George Leonard   
on   
2022   
   
                                                    Potassium   
[Moles/Vol]     4.4 mmol/L                      3.5-5.1         [facility]  
   
                                                    Serum or plasma sodium measu  
rement (moles/volume)Ordered By: George Leonard on   
2022   
   
                      Sodium [Moles/Vol] 135 mmol/L            136-146    [facility]  
   
                                                    Serum or plasma total carbon  
 dioxide measurement (moles/volume)Ordered By:   
George Leonard on 2022   
   
                      CO2 [Moles/Vol] 25.3 mmol/L            22.0-30.0  [facility]  
   
                                                    COVID-19 Antigenon   
2   
   
                                        COVID-19 Antigen    Healthcare Worker?:   
N  
[facility] Reference  
------------------------  
------------------------  
------  
[facility] Reference  
Negative  
[facility] Blank  
------------------------  
----------------  
COVID19 Pos Results  
Positive results will   
only be called to  
COVID19 Det Results  
Providers for the   
following groups of   
patients:  
COVID19 Pos Results  
Pre-Surgical Testing,   
Emergency Room, and   
Inpatients.  
[facility]  
------------------------  
----------------  
SARS-CoV+SARS-CoV-2   
(COVID-19) Ag [Presence]   
in Respiratory specimen   
by Rapid immunoassay  
Positive for SARS   
Antigen by MILA  
[facility]  
------------------------  
------------------------  
------  
[facility] Disclaimer  
The [place] SARS Antigen   
MILA does not   
differentiate  
[facility] Disclaimer  
between SARS-CoV and   
SARS-CoV-2.  
COVID19 [facility]  
------------------------  
------------------------  
------  
[facility] Disclaimer  
This test was developed   
and its performance  
[facility] Disclaimer  
characteristic   
determined by Filao and  
[place] Disclaimer  
validated at [facility].   
This  
[facility] Disclaimer  
test has not been FDA   
cleared or approved.   
This  
[facility] Disclaimer  
test has been authorized   
by FDA under an   
Emergency Use  
[facility] Disclaimer  
Authorization (EUA).   
This test has been   
validated  
[facility] Disclaimer  
in accordance with the   
FDA's Guidance Document   
(Policy  
[facility] Disclaimer  
for Diagnostics Testing   
in Laboratories   
Certified to  
[facility] Disclaimer  
Perform High Complexity   
Testing under CLIA prior   
to  
[facility] Disclaimer  
Emergency Use   
Authorization for   
Coronavirus  
[facility] Disclaimer  
iseas during the   
Public Health Emergency)  
[facility] Disclaimer  
issued on 2020.   
This test is only   
authorized  
[facility] Disclaimer  
for the duration of time   
the declaration that  
[facility] Disclaimer  
circumstances exist   
justifying the   
authorization of  
[facility] Disclaimer  
the emergency use of in   
vitro diagnostic tests   
for  
[facility] Disclaimer  
detection of SARS-CoV-2   
virus and/or diagnosis   
of  
[facility] Disclaimer  
COVID-19 infection under   
section 564(b)(1) of the  
[place] Disclaimer  
Act, 21 U.S.C.   
360bbb-3(b)(1), unless   
the  
[facility] Disclaimer  
authorization is   
terminated or revoked   
sooner.  
PERFORMED BY:  
[facility]  
[address] [place] [address]  
[phone]  
PATHOLOGIST MEDICAL   
DIRECTOR  
NAKITA BLOUNT M.D.    Normal                                  [facility]  
   
                                        Comment on above:   Performed By: #### C  
OVID-19 VANESSA, SOFIAPOS ####  
[facility]  
[address] [place] [address] [place]   
   
                                                    COVID-19 SOFIAOrdered By: LAURA Murphy on 2022   
   
                                                    SARS-CoV+SARS-CoV-2   
(COVID-19) Ag   
IA.rapid Ql (Resp) Positive                        Negative        [facility]  
   
                                        Comment on above:   This is a duplicate   
[place] SARS Antigen (MILA) result to be used  
   
for statistical tracking purpose only.   
   
                                                    Laboratory - Microbiology an  
d Antimicrobial susceptibilityon 2022   
   
                                                    SARS-CoV-2   
(COVID-19) RNA   
ARMANDO+probe Ql (Unsp   
spec)                                                           [facility]   
250 DO  
Work Phone:   
[phone]  
   
                                                    No Panel InformationOrdered   
By: LAURA Murphy on 2022   
   
                      SARS Antigen (LFIA)                                  [facility]  
   
                                                    No Panel Informationon    
   
                                                Positive        Critically   
abnormal                  Negative                  [facility]   
250 DO  
Work Phone:   
[phone]  
   
                                        Comment on above:   This is a duplicate   
[place] SARS Antigen (MILA) result to be used  
   
for statistical tracking purpose only.PERFORMED BY:[facility] [place]   
[phone] MEDICAL DIRECTORNAKITA BLOUNT M.D.   
   
                                                    [place] Ag Positiveon 20  
22   
   
                                [place] Ag Positive Positive        Critically   
abnormal                  Negative                  [facility]  
   
                                        Comment on above:   Result Comment: This  
 is a duplicate [place] SARS Antigen (MILA)   
result to be  
used for statistical tracking purpose only.  
PERFORMED BY:  
[facility]  
[address] [place] [address]  
[phone]  
PATHOLOGIST MEDICAL DIRECTOR  
NAKITA BLOUNT M.D.   
   
                                                            Performed By: #### C  
OVID-19 VANESSA, SOFIAPOS ####  
[facility]  
[address] [id number] [facility]   
   
                                                    Office Visit (Cardiology)on   
2022   
   
                                        Follow-up visit     Diagnoses/Problems  
Assessed  
Family history of   
cerebrovascular accident   
(CVA) (V17.1) (Z82.3) :   
Father  
Atrial fibrillation   
(427.31) (I48.91)  
Overweight with body   
mass index (BMI) of 25   
to 25.9 in adult   
(278.02,V85.21)  
(E66.3,Z68.25)  
Former smoker (V15.82)   
(Z87.891)  
quit somking at age 34  
Dyspnea on exertion   
(786.09) (R06.00)  
Nonischemic   
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   
(I42.8)  
Orders  
Atrial fibrillation  
Cardioversion;   
Status:Active -   
Retrospective   
Authorization; Requested   
for:2022;  
IO EKG   
Electrocardiogram- 12   
Lead; Status:Complete;   
Done: 2022  
Atrial fibrillation,   
Nonischemic   
cardiomyopathy  
Start: Carvedilol 6.25   
MG Oral Tablet (Coreg);   
TAKE 1 TABLET TWICE   
DAILY WITH  
MEALS  
Start: Losartan   
Potassium 25 MG Oral   
Tablet; TAKE 1 TABLET   
DAILY AS DIRECTED  
Nonischemic   
cardiomyopathy  
Start: Eplerenone 25 MG   
Oral Tablet; TAKE 1   
TABLET DAILY  
Overweight with body   
mass index (BMI) of 25   
to 25.9 in adult  
Healthy Weight Tips;   
Status:Complete -   
Retrospective   
Authorization; Done:   
2022  
Some eating tips that   
can help you lose   
weight.; Status:Complete   
- Retrospective  
Authorization; Done:   
2022  
SocHx: Former smoker  
Tobacco Use Screening;   
Status:Complete; Done:   
2022  
Patient Instructions  
By signing my name   
below, I, Elham Avlear LPN. ,Scribe,   
attest that this   
documentation has been   
prepared under the   
direction and in the   
presence of Dr. Agustín Murphy DO.  
Please bring all   
medicines, vitamins, and   
herbal supplements with   
you when you come to the   
office.  
Prescriptions will not   
be filled unless you are   
compliant with your   
follow up appointments   
or have a follow up   
appointment scheduled as   
per instruction of your   
physician. Refills   
should be requested at   
the time of your visit.  
FALL PREVENTION   
EDUCATION GIVEN  
Follow up after testing   
completed  
  
Chief Complaint  
ENMANUEL ARRINGTON is being seen   
for a consultation for   
HOY ABN ECHO.  
76-year-old gentleman   
seen in cardiology   
consultation at the   
request of Dr. Jansen for   
abnormal echocardiogram   
and stress imaging. He   
complains of exertional   
dyspnea he states that   
occurred around or just   
prior to his first   
booster shot for COVID.   
He also admits that he   
probably had COVID   
illness but was never   
tested earlier in the   
year.  
His chief complaints   
right now are occasional   
lower extremity edema   
right greater than left   
that is improved with   
transient use of   
diuretics administered   
by Dr. Jansen. Exertional   
dyspnea with any type of   
walking or walking up   
stairs or work related   
activities. He denies   
angina, true heart   
failure   
hospitalizations,   
syncope. He  
He does admit to history   
of irregular heartbeat   
dating back to  when   
he was in [place]   
details of which are   
unknown. 4 weeks ago   
when in Dr. Jansen's office   
for the first time in   
many years, ECG was   
performed revealing   
atrial fibrillation with   
controlled ventricular   
response. Subsequent   
stress perfusion imaging   
and echocardiography   
were performed at   
[facility]   
revealing normal   
perfusion scan, but   
atrial fibrillation so   
no ejection fraction   
estimate was performed   
on perfusion scan.   
Echocardiogram revealed   
reduced left ventricular   
function with ejection   
fraction estimated to be   
40 to 45%.  
He was then placed on   
Eliquis for his A. fib,   
and it appears that he   
has been on Eliquis for   
2 to 3 weeks. Patient   
and his wife will   
corroborate when his   
start date for Eliquis   
was and return that   
information.  
My estimation is that he   
has left ventricular   
systolic heart failure   
likely secondary to   
chronic atrial   
fibrillation (duration   
of which is unknown). It   
is worth a shot at least   
to try cardioversion at   
least once in order to   
restore rhythm.  
Recommendations,   
informed decision-making   
process, risk benefits   
alternatives of   
discussed with patient.   
We will proceed with   
guideline directed   
medical therapies   
including institution of   
carvedilol, losartan,   
eplerenone, and elective   
cardioversion in the end   
of  at least 6 weeks   
after its initiation of   
Eliquis, and follow-up   
thereafterwards. We may   
need to institute loop   
diuretics if necessary.   
Otherwise if atrial   
fibrillation continues   
to be problematic I will   
have to refer him either   
to general cardiology or   
electrophysiology in the   
future otherwise I will   
be happy to take care of   
the left ventricular   
function issues at this   
moment.  
  
Active Problems Problems  
Former smoker (V15.82)   
(Z87.891)  
quit somking at age 34  
Surgical History  
Problems  
History of Middle ear   
surgery  
History of Oral surgery  
History of Tonsillectomy  
Current Meds  
  
Medication   
NameInstruction  
Eliquis 5 MG Oral   
TabletTake 1 tablet   
twice daily  
Protonix 40 MG Oral   
Tablet Delayed   
ReleaseTAKE 1 TABLET   
DAILY.  
Allergies  
Medication  
No Known Drug Allergies  
Recorded By: Almita Alford; 2022   
10:01:43 AM  
Social History  
Problems  
Consumes alcohol   
occasionally (V49.89)   
(Z78.9)  
Daily caffeine   
consumption  
2 cups of coffee daily,   
pop three times weekly  
Former smoker (V15.82)   
(Z87.891)  
quit somking at age 34  
Illicit drug use (305   
(more content not   
included)...        Normal                                  [facility]  
   
                                                    PHQ-2 VITALSon 2022   
   
                                        Fall risk assessment b) One or more fall  
s in   
the last year                                               [facility]   
[facility]   
250 DO  
Work Phone:   
[phone]  
   
                                                    Tobacco use status   
[facility]            b) No                                           [facility]   
[facility]   
250 DO  
Work Phone:   
[phone]  
   
                      PHQ-2 VITALS Yes                              [facility]  
o   
[facility]   
250 DO  
Work Phone:   
[phone]  
   
                                                    NM david perf SPECT rest stron  
 2022   
   
                                                    NM david perf SPECT   
rest str                                [facility] [place] [address]  
  
Nuclear Medicine Report  
Signed  
  
Patient: Enmanuel Arrington   
MR#: [id number]  
  
: 1945   
Acct:[id number]  
  
Age/Sex: 76 / M ADM   
Date: 22  
  
Loc: [place] Room: Type: [facility]  
Attending Dr: Leatha Jansen MD  
  
Ordering Provider:   
Leatha Jansen MD  
Date of Service:   
22  
Accession #:   
([id number]) NM/NM david   
perf SPECT rest str:   
DYSPNEA, PALPITATIONS,   
CHEST PAIN/ A-FIB  
  
  
Copies to: MD Jackelin Borja MD  
  
  
REFERRING PHYSICIAN:   
Leatha Jansen MD  
  
REASON FOR STUDY: Chest   
pain, shortness of   
breath.  
  
PROCEDURE: The patient   
underwent one-day   
rest/stress protocol.   
Rest images obtained by   
injecting  
6.1 mCi of Cardiolite.   
Stress images obtained   
by injecting 18.6 mCi of   
Cardiolite.   
Subsequently,  
perfusion studies were   
performed. Gated SPECT   
was not done due to the   
presence of atrial  
fibrillation.  
  
IMAGING RESULT: This   
appears to be fair   
study. It appears to be   
fairly normal. There is   
no clear  
pattern of ischemia or   
myocardial infarction.  
  
CONCLUSION:  
  
1. No ischemia or   
myocardial infarction.  
2. Left ejection   
fraction study could not   
be performed due to the   
presence of atrial  
fibrillation.  
  
  
  
Transcribed By: CHIKA   
22 [id number]  
Dictated By: Jackelin De La Garza MD 22   
1113  
  
Signed By:  
22 1132       [facility]  
   
                                                    STR cardiac stress/lexiscano  
n 2022   
   
                                                    STR cardiac   
stress/lexiscan                         [facility] [place] [address]  
  
Cardiac Stress Test  
Signed  
  
Patient: Enmanuel Arrington   
MR#: [id number]  
  
: 1945   
Acct:[id number]  
  
Age/Sex: 76 / M ADM   
Date: 22  
  
Loc: [place] Room: Type: [facility]  
Attending Dr: Leatha Jansen MD  
Copies to: MD Jackelin Borja MD  
  
  
  
Ordering Provider:   
Leatha Jansen MD  
Date of Service:   
22  
Accession #:   
([id number] STR/STR   
cardiac stress/lexiscan:   
afib ,dyspnea   
palpitations cp  
  
  
  
  
REFERRING PHYSICIAN:   
Leatha Jansen MD  
  
REASON FOR STUDY:   
Shortness of breath and   
palpitation.  
  
PROCEDURE: The patient   
underwent a Lexiscan   
myocardial perfusion   
study. The patient was   
injected  
with 0.4 mg of Lexiscan,   
following which no   
symptoms reported. Blood   
pressure and heart   
response to  
Lexiscan was   
physiologic. Baseline   
ECG showed normal sinus   
rhythm, no ST-T changes.   
Following  
Lexiscan, no changes   
were seen.  
  
CONCLUSION:  
1. Lexiscan Cardiolite   
stress test without   
diagnostic ST-T changes   
for ischemia.  
2. No provoked chest   
pain or arrhythmia.  
3. Appropriate   
hemodynamic response to   
Lexiscan.  
4. Perfusion images will   
be dictated separately.  
  
  
  
Transcribed By: CHIKA   
22 0954  
Dictated By: Jackelin De La Garza MD 22   
1056  
  
Signed By:  
22       [facility]  
   
                                                    ECH echo transthoracicon    
   
                                                    [facility] echo   
transthoracic                           [facility] [address] [address]  
  
Echocardiogram  
Signed  
  
Patient: Enmanuel Arrington   
MR#: [id number]  
  
: 1945   
Acct:[id number]  
  
Age/Sex: 76 / M ADM   
Date: 22  
  
Loc: [place] Room: Type: [facility]  
Attending Dr: Leatha Jansen MD  
  
Ordering Provider:   
Leatha Jansen MD  
Date of Service:   
22/  
Accession #:   
([id number]) ECH/ECH   
echo transthoracic:   
DYSPNEA, PALPITATONS,   
CHEST PAIN, A-FIB.  
  
  
Copies to: MD George Borja MD,   
FACC  
  
  
PM  
MRN: [id number] Patient   
Location: [place]  
  
: 1945 Gender:   
Male  
(MM/DD/YYYY)  
  
Age: 76 Years Ordering   
Physician: Leatha Jansen  
  
Height: 67 in Referring   
Physician: LAURA Murphy  
  
Weight: 165.003 lb   
Performed By: NOHEMI Shirley  
  
BSA: 1.86 m2  
  
BP: 138 / 96 mmHg HR: 65   
bpm  
  
Reason For Study:   
DYSPNEA,  
  
PALPITATONS, CHEST PAIN,   
A-  
FIB.  
  
History: former smoker  
  
+-----------------------  
------------------------  
------------------------  
----+  
Interpretation Summary  
  
The left ventricular   
size and thickness are   
normal.  
There are regional wall   
motion abnormalities as   
specified.  
  
There is septal   
akinesis.  
Septal motion is   
consistent with   
conduction abnormality.  
Ejection Fraction =   
40-45%.  
The left atrium appears   
mildly dilated.  
  
The right atrium is   
mildly dilated.  
  
Procedure/Quality: A   
two-dimensional   
transthoracic  
echocardiogram with   
color flow and Doppler   
was  
performed.  
  
Left Ventricle: The left   
ventricular size and   
thickness are  
normal. Ejection   
Fraction = 40-45%. There   
are regional wall  
motion abnormalities as   
specified. There is   
septal akinesis.  
  
Septal motion is   
consistent with   
conduction abnormality.  
  
Left Atrium: The left   
atrium appears mildly   
dilated. The  
atrial septum appears   
normal.  
  
Right Atrium: The right   
atrium is mildly   
dilated.  
  
Right Ventricle: The   
right ventricular size,   
thickness and  
  
function are normal.  
  
Aortic Valve: The aortic   
valve is normal in   
structure and  
function.  
  
Mitral Valve: The mitral   
valve is normal.  
  
Tricuspid Valve: The   
tricuspid valve is   
normal.  
  
Pulmonic Valve: The   
pulmonic valve is not   
well visualized.  
  
Arteries: The aortic   
root is normal size.  
  
Pericardium/Pleura: No   
pericardial effusion   
seen. There is  
no pleural effusion.  
  
IVC/Hepatic Veins: The   
inferior vena cava was   
not  
  
visualized during the   
exam.  
  
Miscellaneous: No   
thrombus, vegetation or   
mass is seen.  
  
MMode/2D Measurements   
Calculations  
  
IVSd (0.7-1.1 cm): 0.85   
cm LVIDd (3.7-5.4 cm):   
4.3 cm  
  
LVPWd (0.7-1.1 cm): 1.07   
cm LVIDs (2.3-3.6 cm):   
3.0 cm  
  
LA dimension (2.3-4.0   
cm): 4.5 cm Ao root diam   
(2.0-3.2 cm): 3.1 cm  
  
FS: 29.7 % Ao root area:   
7.6 cm2  
  
EDV(Teich): 83.1 ml  
  
ESV(Teich): 35.7 ml  
  
EF(Teich): 57.0 %  
  
Doppler Measurements   
Calculations  
  
MV E max yaw: 74.1   
cm/sec  
  
MV A max yaw: 29.1   
cm/sec  
  
MV dec time: 0.38 sec  
  
MV dec slope: 196.7   
cm/sec??  
  
E/E' lat: 5.4  
  
E/E' med: 6.4  
  
TV max P.0 mmHg  
  
TR max yaw: 221.3 cm/sec  
  
TR max P.6 mmHg  
  
RAP systole: 10.0 mmHg  
  
+-----------------------  
------------------------  
------------------------  
-+  
+-----------------------  
------------------------  
------------------------  
-+  
+-----------------------  
---+--------------------  
------------------------  
-+  
: Electronically signed   
:  
: by: GEORGE MORRISON. :  
: MD JASMYNE, :  
: :  
: [facility] :  
: on: 2022, 1:49 :  
: :  
: PM :  
  
  
  
  
  
Transcribed By: SCV  
Performed At: 22   
1222  
Signed By: George Leonard MD, [facility]   
22 1349       [facility]  
   
                                                    BNPon 2022   
   
                                                    Natriuretic peptide   
B (Bld) [Mass/Vol] 1031.0 pg/mL    Normal          <=1,800.0       [facility]  
   
                                        Comment on above:   Performed By: #### T  
SH, CMP, LIPID, CMADM, BNP, T7 ####  
[facility]  
[address] [place] [address]  
Dr. Sam Gonzalez   
   
                                                    CARDIAC FRANCIA ADMITon   
022   
   
                                                    CK [Catalytic   
activity/Vol]   55 U/L          Normal                    [facility]  
   
                                        Comment on above:   Performed By: #### T  
SH, CMP, LIPID, CMADM, BNP, T7 ####  
[facility]  
[address] [address]  
Dr. Sam Gonzalez   
   
                      CK.MB [Mass/Vol] 1.57 ng/mL Normal     <=2.37     [facility]  
   
                                        Comment on above:   Performed By: #### T  
SH, CMP, LIPID, CMADM, BNP, T7 ####  
[facility]  
[address] [address]  
Dr. Sam Gonzalez   
   
                      HSTROP     13.8 pg/mL Normal     4.0-42.2   [facility]  
   
                                        Comment on above:   Result Comment: CUT-  
OFF POINTS HAVE BEEN ESTABLISHED BASED ON   
THE   
FOURTH UNIVERSAL DEFINITIONS OF MYOCARDIAL  
INFARCTION. THE UPPER REFERENCE LIMIT (URL) OF TROPONIN, DEFINED   
AS THE 99TH PERCENTILE OF  
cTnI DISTRIBUTION IN A REFERENCE POPULATION, HAS BEEN CONFIRMED   
AS THE DECISION THRESHOLD  
FOR MI DIAGNOSIS.   
   
                                                            Performed By: #### T  
SH, CMP, LIPID, CMADM, BNP, T7 ####  
[facility]  
[address] [address]  
Dr. Sam Gonzalez   
   
                      DAVID        56.0 ng/mL Normal     <=121.0    [facility]  
   
                                        Comment on above:   Performed By: #### T  
SH, CMP, LIPID, CMADM, BNP, T7 ####  
[facility]  
[address] [place] [address]  
Dr. Sam Gonzalez   
   
                                                    CBC AUTO DIFFon 2022   
   
                      BASO #     0.1 103/ul Normal     0.0-0.1    [facility]  
   
                                        Comment on above:   Performed By: #### D  
HAILEY, ERUR ####  
[facility]  
[address] [place] [address]  
Dr. Sam Gonzalez   
   
                                                    Basophils/100 WBC   
(Bld)           1.1 %           Normal          0.2-2.0         [facility]  
   
                                        Comment on above:   Performed By: #### D  
HAILEY, ERUR ####  
[facility]  
[address] [place] [address]  
Dr. Sam Gonzalez   
   
                      EO #       0.8 103/ul Critically high 0.0-0.7    [facility]  
   
                                        Comment on above:   Performed By: #### D  
HAILEY, ERUR ####  
[facility]  
[address] [place] [address]  
Dr. Sam Gonzalez   
   
                                                    Eosinophils/100 WBC   
(Bld)           14.7 %          Critically high 0.9-7.0         [facility]  
   
                                        Comment on above:   Performed By: #### D  
HAILEY, ERUR ####  
[facility]  
[address] [place] [address]  
Dr. Sam Gonzalez   
   
                                                    Erythrocyte   
distribution width   
(RBC) [Ratio]   14.0 %          Normal          11.0-15.0       [facility]  
   
                                        Comment on above:   Performed By: #### BRE HART, ERUR ####  
[facility]  
[address] [place] [address]  
Dr. Sam Gonzalez   
   
                                                    Hematocrit (Bld)   
[Volume fraction] 43.1 %          Normal          42.0-54.0       [facility]  
   
                                        Comment on above:   Performed By: #### BRE HART, ERUR ####  
[facility]  
[address] [place] [address]  
Dr. Sam Gonzalez   
   
                                                    Hemoglobin (Bld)   
[Mass/Vol]      14.4 g/dL       Normal          14.0-18.0       [facility]  
   
                                        Comment on above:   Performed By: #### D  
HAILEY, ERUR ####  
[facility]  
[address] [address]  
Dr. Sam Gonzalez   
   
                      IG #       0.01 10e3/ul Normal     0.00-0.03  [facility]  
   
                                        Comment on above:   Performed By: #### D  
DOMINIKD, ERUR ####  
[facility]  
[address] [address]  
Dr. Sam Gonzalez   
   
                      IG %       0.2 %      Normal     0.0-0.5    [facility]  
   
                                        Comment on above:   Performed By: #### D  
HAILEY, ERUR ####  
[facility]  
[address] [address]  
Dr. Sam Gonzalez   
   
                      LYMPH #    1.7 103/ul Normal     1.2-3.8    [facility]  
   
                                        Comment on above:   Performed By: #### D  
HAILEY, ERUR ####  
[facility]  
[address] [address]  
Dr. Sam Gonzalez   
   
                                                    Lymphocytes/100 WBC   
(Bld)           32.7 %          Normal          20.5-60.0       [facility]  
   
                                        Comment on above:   Performed By: #### D  
HAILEY, ERUR ####  
[facility]  
[address] [place] [address]  
Dr. Sam Gonzalez   
   
                      MANUAL DIFF REQ NO         Normal                [facility]  
   
                                        Comment on above:   Performed By: #### D  
HAILEY, ERUR ####  
[facility]  
[address] [address]  
Dr. Sam Gonzalez   
   
                                                    MCH (RBC) [Entitic   
mass]           34.6 pg         Critically high 25.9-34.0       [facility]  
   
                                        Comment on above:   Performed By: #### D  
HAILEY, ERUR ####  
[facility]  
[address] [address]  
Dr. Sam Gonzalez   
   
                                                    MCHC (RBC)   
[Mass/Vol]      33.4 g/dL       Normal          29.9-35.2       [facility]  
   
                                        Comment on above:   Performed By: #### D  
HAILEY, ERUR ####  
[facility]  
[address] [address]  
Dr. Sam Gonzalez   
   
                                                    MCV (RBC) [Entitic   
vol]            103.6 fL        Critically high 80.0-94.0       [facility]  
   
                                        Comment on above:   Performed By: #### D  
HAILEY, ERUR ####  
[facility]  
[address] [place] [address]  
Dr. Sam Gonzalez   
   
                      MONO #     0.6 103/ul Normal     0.3-0.8    [facility]  
   
                                        Comment on above:   Performed By: #### D  
HAILEY, ERUR ####  
[facility]  
[address] [place] [address]  
Dr. Sam Gonzalez   
   
                                                    Monocytes/100 WBC   
(Bld)           10.4 %          Normal          1.7-12.0        [facility]  
   
                                        Comment on above:   Performed By: #### BRE HART, ERUR ####  
[facility]  
[address] [place] [address]  
Dr. Sam Gonzalez   
   
                      NEUT #     2.2 103/ul Normal     1.4-6.5    [facility]  
   
                                        Comment on above:   Performed By: #### BRE HART, ERUR ####  
[facility]  
[address] [place] [address]  
Dr. Sam Gonzalez   
   
                                                    Neutrophils/100 WBC   
(Bld)           40.9 %          Critically low  43.0-75.0       [facility]  
   
                                        Comment on above:   Performed By: #### BRE HART, ERUR ####  
[facility]  
[address] [place] [address]  
Dr. Sam Gonzalez   
   
                                                    Platelet mean volume   
(Bld) [Entitic vol] 9.6 fL          Normal          9.5-13.5        [facility]  
   
                                        Comment on above:   Performed By: #### BRE HART, ERUR ####  
[facility]  
[address] [place] [address]  
Dr. Sam Gonzalez   
   
                      PLT        154 103/ul Normal     150-450    [facility]  
   
                                        Comment on above:   Performed By: #### BRE HART, ERUR ####  
[facility]  
[address] [place] [address]  
Dr. Sam Gonzalez   
   
                      RBC        4.16 106/ul Critically low 4.70-6.10  [facility]  
   
                                        Comment on above:   Performed By: #### BRE HART, ERUR ####  
[facility]  
[address] [address]  
Dr. Sam Gonzalez   
   
                      WBC        5.3 103/ul Normal     4.0-11.0   [facility]  
   
                                        Comment on above:   Performed By: #### D  
GREER HARTR ####  
[facility]  
[address] [address]  
Dr. Sam Gonzalez   
   
                                                    FREE THYROXINE INDEX T7on    
   
                      FTI        2.59       Normal                [facility]  
   
                                        Comment on above:   Performed By: #### T  
SH, CMP, LIPID, CMADM, BNP, T7 ####  
[facility]  
[address] [address]  
Dr. Sam Gonzalez   
   
                      T3U        35.0 %     Normal     23.5-40.5  [facility]  
   
                                        Comment on above:   Performed By: #### T  
SH, CMP, LIPID, CMADM, BNP, T7 ####  
[facility]  
[address] [address]  
Dr. Sam Gonzalez   
   
                      T4 [Mass/Vol] 7.40 ug/dL Normal     5.53-11.00 [facility]  
   
                                        Comment on above:   Performed By: #### T  
SH, CMP, LIPID, CMADM, BNP, T7 ####  
[facility]  
[address] [address]  
Dr. Sam Gonzalez   
   
                                                    GLYCOHEMOGLOBIN A1Con 2022   
   
                                        ADA RECOMMENDATION  ADA THERAPEUTIC TARG  
ET   
6.0 - 7.0 ACTION   
SUGGESTED > 7.0     Normal                                  [facility]  
   
                                        Comment on above:   Performed By: #### A  
1C ####  
[facility]  
[address] [address]  
Dr. Sam Gonzalez   
   
                      Glucose [Mass/Vol] 111 mg/dL  Normal                [facility]  
   
                                        Comment on above:   Performed By: #### A  
1C ####  
[facility]  
[address] [place] [address]  
Dr. Sam Gonzalez   
   
                                                    HbA1c (Bld) [Mass   
fraction]       5.5 %           Normal          <=6.0           [facility]  
   
                                        Comment on above:   Performed By: #### A  
1C ####  
[facility]  
[address] [place] [address]  
Dr. Sam Gonzalez   
   
                                                    LIPID PROFILEon 2022   
   
                      CHOL-HDL RATIO NORM SEE BELOW  Normal                [facility]  
   
                                        Comment on above:   Result Comment: 3.3   
- 4.4 LOW RISK 4.4 - 7.1 AVERAGE RISK 7.1   
-   
11.0 MODERATE RISK >11.0 HIGH RISK   
   
                                                            Performed By: #### T  
SH, CMP, LIPID, CMADM, BNP, T7 ####  
[facility]  
[address] [address]  
Dr. Sam Gonzalez   
   
                                                    Cholesterol   
[Mass/Vol]      166 mg/dL       Normal          <=200           [facility]  
   
                                        Comment on above:   Performed By: #### T  
SH, CMP, LIPID, CMADM, BNP, T7 ####  
[facility]  
[address] [address]  
Dr. Sam Gonzalez   
   
                                                    Cholesterol in HDL   
[Mass/Vol]      68 mg/dL        Critically high 40-60           [facility]  
   
                                        Comment on above:   Performed By: #### T  
SH, CMP, LIPID, CMADM, BNP, T7 ####  
[facility]  
[address] [address]  
Dr. Sam Gonzalez   
   
                                                    Cholesterol in LDL   
[Mass/Vol]      89.4 mg/dL      Normal                          [facility]  
   
                                        Comment on above:   Performed By: #### T  
SH, CMP, LIPID, CMADM, BNP, T7 ####  
[facility]  
[address] [address]  
Dr. Sam Gonzalez   
   
                                                    Cholesterol.total/Ch  
olesterol in HDL   
[Mass ratio]    2.4 {ratio}     Normal                          [facility]  
   
                                        Comment on above:   Performed By: #### T  
SH, CMP, LIPID, CMADM, BNP, T7 ####  
[facility]  
[address] [address]  
Dr. Sam Gonzalez   
   
                                        HDL NORMAL          > or = 60 mg/dl - LO  
W   
CARDIOVASCULAR RISK <40   
mg/dl - HIGH   
CARDIOVASCULAR RISK Normal                                  [facility]  
   
                                        Comment on above:   Performed By: #### T  
SH, CMP, LIPID, CMADM, BNP, T7 ####  
[facility]  
[address] [address]  
Dr. Sam Gonzalez   
   
                      LDL CALC NORMAL SEE BELOW  Normal                [facility]  
   
                                        Comment on above:   Result Comment: <100  
 mg/dl OPTIMAL 100 - 129 mg/dl NEAR OR   
ABOVE   
OPTIMAL 130 - 159 mg/dl BORDERLINE HIGH 160 - 189 mg/dl HIGH >190   
mg/dl VERY HIGH   
   
                                                            Performed By: #### T  
SH, CMP, LIPID, CMADM, BNP, T7 ####  
[facility]  
[address] [address]  
Dr. Sam Gonzalez   
   
                                                    Triglyceride   
[Mass/Vol]      43 mg/dL        Normal          <=150           [facility]  
   
                                        Comment on above:   Performed By: #### T  
SH, CMP, LIPID, CMADM, BNP, T7 ####  
[facility]  
[address] [address]  
Dr. Sam Gonzalez   
   
                      VLDL CALC  8.6 mg/dL  Normal                [facility]  
   
                                        Comment on above:   Performed By: #### T  
SH, CMP, LIPID, CMADM, BNP, T7 ####  
[facility]  
[address] [address]  
Dr. Sam Gonzalez   
   
                                                    PROF 14(COMP METB)on   
022   
   
                      Albumin [Mass/Vol] 3.7 g/dL   Normal     3.4-5.0    [facility]  
   
                                        Comment on above:   Performed By: #### T  
SH, CMP, LIPID, CMADM, BNP, T7 ####  
[facility]  
[address] [address]  
Dr. Sam Gonzalez   
   
                                                    Albumin/Globulin   
[Mass ratio]    1.2 {ratio}     Normal                          [facility]  
   
                                        Comment on above:   Performed By: #### T  
SH, CMP, LIPID, CMADM, BNP, T7 ####  
[facility]  
[address] [address]  
Dr. Sam Gonzalez   
   
                                                    ALP [Catalytic   
activity/Vol]   81 U/L          Normal                    [facility]  
   
                                        Comment on above:   Performed By: #### T  
SH, CMP, LIPID, CMADM, BNP, T7 ####  
[facility]  
[address] [address]  
Dr. Sam Gonzalez   
   
                                                    ALT [Catalytic   
activity/Vol]   26 U/L          Normal          16-63           [facility]  
   
                                        Comment on above:   Performed By: #### T  
SH, CMP, LIPID, CMADM, BNP, T7 ####  
[facility]  
[address] [address]  
Dr. Sam Gonzalez   
   
                                                    Anion gap   
[Moles/Vol]     11.8 mmol/L     Normal                          [facility]  
   
                                        Comment on above:   Performed By: #### T  
SH, CMP, LIPID, CMADM, BNP, T7 ####  
[facility]  
[address] [place] [address]  
Dr. Sam Gonzalez   
   
                                                    AST [Catalytic   
activity/Vol]   19 U/L          Normal          15-37           [facility]  
   
                                        Comment on above:   Performed By: #### T  
SH, CMP, LIPID, CMADM, BNP, T7 ####  
[facility]  
[address] [address]  
Dr. Sam Gonzalez   
   
                      Bilirubin [Mass/Vol] 1.6 mg/dL  Critically high 0.2-1.3     
 [facility]  
   
                                        Comment on above:   Performed By: #### T  
SH, CMP, LIPID, CMADM, BNP, T7 ####  
[facility]  
[address] [place] [address]  
Dr. Sam Gonzalez   
   
                      Calcium [Mass/Vol] 8.6 mg/dL  Normal     8.5-10.1   [facility]  
   
                                        Comment on above:   Performed By: #### T  
SH, CMP, LIPID, CMADM, BNP, T7 ####  
[facility]  
[address] [place] [address]  
Dr. Sam Gonzalez   
   
                      Chloride [Moles/Vol] 105 mmol/L Normal          [facility]   
[facility]  
   
                                        Comment on above:   Performed By: #### T  
SH, CMP, LIPID, CMADM, BNP, T7 ####  
[facility]  
[address] [place] [address]  
Dr. Sam Gonzalez   
   
                      CO2 [Moles/Vol] 27.9 mmol/L Normal     22.0-30.0  [facility]  
   
                                        Comment on above:   Performed By: #### T  
SH, CMP, LIPID, CMADM, BNP, T7 ####  
[facility]  
[address] [address]  
Dr. Sam Gonzalez   
   
                                                    Creatinine   
[Mass/Vol]      0.89 mg/dL      Normal          0.66-1.25       [facility]  
   
                                        Comment on above:   Performed By: #### T  
SH, CMP, LIPID, CMADM, BNP, T7 ####  
[facility]  
[address] [place] [address]  
Dr. Sam Gonzalez   
   
                      EGFR-AF AMERICAN >60        Normal     >=60       [facility]  
   
                                        Comment on above:   Performed By: #### T  
SH, CMP, LIPID, CMADM, BNP, T7 ####  
[facility]  
[address] [address]  
Dr. Sam Gonzalez   
   
                      EGFR-NON AF AMERICAN >60        Normal     >=60       [facility]   
[facility]  
   
                                        Comment on above:   Performed By: #### T  
SH, CMP, LIPID, CMADM, BNP, T7 ####  
[facility]  
[address] [address]  
Dr. Sam Gonzalez   
   
                                                    Globulin (S)   
[Mass/Vol]      3.1 g/dL        Normal                          [facility]  
   
                                        Comment on above:   Performed By: #### T  
SH, CMP, LIPID, CMADM, BNP, T7 ####  
[facility]  
[address] [address]  
Dr. Sam Gonzalez   
   
                      Glucose [Mass/Vol] 92 mg/dL   Normal          [facility]  
   
                                        Comment on above:   Performed By: #### T  
SH, CMP, LIPID, CMADM, BNP, T7 ####  
[facility]  
[address] [address]  
Dr. Sam Gonzalez   
   
                                                    Potassium   
[Moles/Vol]     4.7 mmol/L      Normal          3.4-5.0         [facility]  
   
                                        Comment on above:   Performed By: #### T  
SH, CMP, LIPID, CMADM, BNP, T7 ####  
[facility]  
[address] [address]  
Dr. Sam Gonzalez   
   
                      Protein [Mass/Vol] 6.8 g/dL   Normal     6.1-8.2    [facility]  
   
                                        Comment on above:   Performed By: #### T  
SH, CMP, LIPID, CMADM, BNP, T7 ####  
[facility]  
[address] [address]  
Dr. Sam Gonzalez   
   
                      Sodium [Moles/Vol] 140 mmol/L Normal     137-145    [facility]  
   
                                        Comment on above:   Performed By: #### T  
SH, CMP, LIPID, CMADM, BNP, T7 ####  
[facility]  
[address] [address]  
Dr. Sam Gonzalez   
   
                                                    Urea nitrogen   
[Mass/Vol]      21.0 mg/dL      Critically high 7.0-18.0        [facility]  
   
                                        Comment on above:   Performed By: #### T  
SH, CMP, LIPID, CMADM, BNP, T7 ####  
[facility]  
[address] [address]  
Dr. Sam Gonzalez   
   
                                                    Urea   
nitrogen/Creatinine   
[Mass ratio]    23.6 mg/mg      Normal                          [facility]  
   
                                        Comment on above:   Performed By: #### T  
SH, CMP, LIPID, CMADM, BNP, T7 ####  
[facility]  
[address] [place], [place] [address]  
Dr. Sam Gonzalez   
   
                                                    TSHon 2022   
   
                      TSH        1.798 uIU/mL Normal     0.470-4.680 [facility]  
   
                                        Comment on above:   Performed By: #### T  
SH, CMP, LIPID, CMADM, BNP, T7 ####  
[facility]  
[address] [address]  
Dr. Sam Gonzalez   
   
                      TSH RANGE  SEE BELOW  Normal                [facility]  
   
                                        Comment on above:   Result Comment: <0.3  
4 UIU/ml HYPERTHYROID 0.34-5.60 UIU/ml   
EUTHYROID >5.60 UIU/ml HYPOTHYROID   
   
                                                            Performed By: #### T  
SH, CMP, LIPID, CMADM, BNP, T7 ####  
[facility]  
[address] [address]  
Dr. Sam Gonzalez   
  
  
  
Vital Signs  
  
  
                          Date Time    Vital Sign   Value        Performing   
Clinician                               Facility  
   
                                                    2023   
09:          Body height         170.2 cm            Sarath Montes   
APRN-CNP  
Work Phone:   
[phone]                          [facility]  
   
                                                    2023   
09:                              Body mass index   
(BMI) [Ratio]             27.41 kg/m2               Sarath Montes   
APRN-CNP  
Work Phone:   
[phone]                          [facility]  
   
                                                    2023   
09:56040          Body weight         79.38 kg            Sarath Montes   
APRN-CNP  
Work Phone:   
[phone]                          [facility]  
   
                                                    2023   
09:                              Diastolic blood   
pressure                  62 mm[Hg]                 Sarath Montes   
APRN-CNP  
Work Phone:   
[phone]                          [facility]  
   
                                                    2023   
09:          Heart rate          62 /min             Sarath Montes   
APRN-CNP  
Work Phone:   
[phone]                          [facility]  
   
                                                    2023   
09:                              Systolic blood   
pressure                  108 mm[Hg]                Sarath Montes   
APRN-CNP  
Work Phone:   
[phone]                          [facility]  
   
                                                    2023   
11:          Body height         170.18 cm           Leatha M Hoy  
Work Phone:   
[phone]                          [facility]   
[address]   
DO  
Work Phone:   
[phone]  
   
                                                    2023   
11:                              Body mass index   
(BMI) [Ratio]             27.25 kg/m2               Leatha M Hoy  
Work Phone:   
[phone]                          [facility]   
[address]   
DO  
Work Phone:   
[phone]  
   
                                                    2023   
11:                              Body surface area   
Derived from   
formula                   1.91 m2                   Leatha M Hoy  
Work Phone:   
[phone]                          [facility]   
[address]   
DO  
Work Phone:   
[phone]  
   
                                                    2023   
11:          Body weight         78.93 kg            Leatha M Hoy  
Work Phone:   
[phone]                          [facility]   
[address]   
DO  
Work Phone:   
[phone]  
   
                                                    2023   
11:                              Diastolic blood   
pressure                  74 mm[Hg]                 Leatha M Hoy  
Work Phone:   
[phone]                          [facility]   
[address]   
DO  
Work Phone:   
[phone]  
   
                                                    2023   
11:          Heart rate          62 /min             Leatha M Hoy  
Work Phone:   
[phone]                          [facility]   
[address]   
DO  
Work Phone:   
[phone]  
   
                                                    2023   
11:                              Systolic blood   
pressure                  116 mm[Hg]                Leatha M Hoy  
Work Phone:   
[phone]                          [facility]   
[address]   
DO  
Work Phone:   
[phone]  
   
                                                    2023   
13:          Body height         170.18 cm           Leatha M Hoy  
Work Phone:   
[phone]                          [facility]   
[facility]   
[address] [place]  
Work Phone:   
[phone]  
   
                                                    2023   
13:                              Body mass index   
(BMI) [Ratio]             27.49 kg/m2               Leatha TAMIKO Hoy  
Work Phone:   
[phone]                          [facility]   
[facility]   
[address] [place]  
Work Phone:   
[phone]  
   
                                                    2023   
13:                              Body surface area   
Derived from   
formula                   1.91 m2                   Leatha M Hoy  
Work Phone:   
[phone]                          [facility]   
[facility]   
[address] [place]  
Work Phone:   
[phone]  
   
                                                    2023   
13:          Body weight         79.61 kg            Leatha MORRISON Hoy  
Work Phone:   
[phone]                          [facility]   
[facility]   
[address] [place]  
Work Phone:   
[phone]  
   
                                                    2023   
13:                              Diastolic blood   
pressure                  74 mm[Hg]                 Leatha MORRISON Hoy  
Work Phone:   
[phone]                          [facility]   
[facility]   
[address] [place]  
Work Phone:   
[phone]  
   
                                                    2023   
13:          Heart rate          87 /min             Leatha TAMIKO Hoy  
Work Phone:   
[phone]                          [facility]   
[facility]   
[address] [place]  
Work Phone:   
[phone]  
   
                                                    2023   
13:                              SaO2% (BldA) [Mass   
fraction]                 96 %                      Leatha M Hoy  
Work Phone:   
[phone]                          [facility]   
[facility]   
[address] [place]  
Work Phone:   
[phone]  
   
                                                    2023   
13:                              Systolic blood   
pressure                  115 mm[Hg]                Leatha M Hoy  
Work Phone:   
[phone]                          [facility]   
[facility]   
[address] [place]  
Work Phone:   
[phone]  
   
                                                    2023   
13:                              0 1                 Leatha M Hoy  
Work Phone:   
[phone]                          [facility]   
[facility]   
[address] [place]  
Work Phone:   
[phone]  
   
                                        Comment on above:   [employer]   
   
                                                    2023   
14:          Body height         170.18 cm           Leatha M Hoy  
Work Phone:   
[phone]                          [facility]   
[facility]   
[address] [place]  
Work Phone:   
[phone]  
   
                                                    2023   
14:                              Body mass index   
(BMI) [Ratio]             27.31 kg/m2               Leatha M Hoy  
Work Phone:   
[phone]                          [facility]   
[facility]   
[address] [place]  
Work Phone:   
[phone]  
   
                                                    2023   
14:                              Body surface area   
Derived from   
formula                   1.91 m2                   Leatha M Hoy  
Work Phone:   
[phone]                          [facility]   
[facility]   
[address] [place]  
Work Phone:   
[phone]  
   
                                                    2023   
14:          Body weight         79.1 kg             Leatha M Hoy  
Work Phone:   
[phone]                          [facility]   
[facility]   
[address] [place]  
Work Phone:   
[phone]  
   
                                                    2023   
14:                              Diastolic blood   
pressure                  82 mm[Hg]                 Leatha M Hoy  
Work Phone:   
[phone]                          [facility]   
[facility]   
[address] [place]  
Work Phone:   
[phone]  
   
                                                    2023   
14:          Heart rate          98 /min             Leatha M Hoy  
Work Phone:   
[phone]                          [facility]   
[facility]   
[address] [place]  
Work Phone:   
[phone]  
   
                                                    2023   
14:                              SaO2% (BldA) [Mass   
fraction]                 96 %                      Leatha M Hoy  
Work Phone:   
[phone]                          [facility]   
[facility]   
[address] [place]  
Work Phone:   
[phone]  
   
                                                    2023   
14:                              Systolic blood   
pressure                  136 mm[Hg]                Leatha M Hoy  
Work Phone:   
[phone]                          [facility]   
[facility]   
[address] [place]  
Work Phone:   
[phone]  
   
                                                    2023   
14:                              0 1                 Leathajessica Vasquezy  
Work Phone:   
[phone]                          [facility]   
[address]   
[address] [place]  
Work Phone:   
[phone]  
   
                                        Comment on above:   [employer]   
   
                                                    2022   
12:          Body height         172.72 cm           Leatha Jansen  
Work Phone:   
[phone]                          GS-Zqhogirmxl-Lryim  
in  
Work Phone:   
[phone]  
   
                                                    2022   
12:                              Body mass index   
(BMI) [Ratio]             23.72 kg/m2               Leatha M [employer]  
Work Phone:   
[phone]                          GA-Dtmfbxddki-Nrrjd  
in  
Work Phone:   
[phone]  
   
                                                    2022   
12:                              Body surface area   
Derived from   
formula                   1.84 m2                   [facility]  
Work Phone:   
[phone]                          LF-Sfknfdwicc-Dvzie  
in  
Work Phone:   
[phone]  
   
                                                    2022   
12:          Body weight         70.76 kg            [facility]  
Work Phone:   
[phone]                          HP-Stgfsputvm-Jxpfv  
in  
Work Phone:   
[phone]  
   
                                                    2022   
12:                              Diastolic blood   
pressure                  89 mm[Hg]                 Leatha  Som  
Work Phone:   
[phone]                          SE-Jmqtbadnpo-Ssnjg  
in  
Work Phone:   
[phone]  
   
                                                    2022   
12:          Heart rate          74 /min             Leatha  Som  
Work Phone:   
[phone]                          QT-Yhhjtnoebv-Kpoyd  
in  
Work Phone:   
[phone]  
   
                                                    2022   
12:                              SaO2% (BldA) [Mass   
fraction]                 97 %                      Leatha M [employer]  
Work Phone:   
[phone]                          MP-Ztbroblzsa-Ztghv  
in  
Work Phone:   
[phone]  
   
                                                    2022   
12:                              Systolic blood   
pressure                  139 mm[Hg]                Leatha M [employer]  
Work Phone:   
[phone]                          JV-Orwlichfjg-Xyhxh  
in  
Work Phone:   
[phone]  
   
                                                    2022   
12:                              0 1                 Leatha M Hoy  
Work Phone:   
[phone]                          BH-Rgzqxwwmmy-Bnoxi  
in  
Work Phone:   
[phone]  
   
                                        Comment on above:   [employer]   
   
                                                    2022   
08:                              SaO2% (BldA) [Mass   
fraction]                 97 %                      MD Leatha Hoy  
Work Phone:   
[phone]                          [facility]  
   
                                                    2022   
10:                              Diastolic blood   
pressure                  72 mm[Hg]                 Leatha M Hoy  
Work Phone:   
[phone]                          [facility]   
[facility]   
DO  
Work Phone:   
[phone]  
   
                                                    2022   
10:                              Systolic blood   
pressure                  120 mm[Hg]                Leatha M Hoy  
Work Phone:   
[phone]                          [facility]   
[facility]   
DO  
Work Phone:   
[phone]  
   
                                                    2022   
10:          Body height         172.72 cm           Leatha M Hoy  
Work Phone:   
[phone]                          [facility]   
[facility] [address]   
DO  
Work Phone:   
[phone]  
   
                                                    2022   
10:                              Body mass index   
(BMI) [Ratio]             25.09 kg/m2               Leatha M Hoy  
Work Phone:   
[phone]                          [facility]   
[facility] [address]   
DO  
Work Phone:   
[phone]  
   
                                                    2022   
10:                              Body surface area   
Derived from   
formula                   1.88 m2                   Leatha M Hoy  
Work Phone:   
[phone]                          [facility]   
[facility]   
DO  
Work Phone:   
[phone]  
   
                                                    2022   
10:          Body weight         74.84 kg            Leatha M Hoy  
Work Phone:   
[phone]                          [facility]   
[facility]   
DO  
Work Phone:   
[phone]  
   
                                                    2022   
10:                              Diastolic blood   
pressure                  70 mm[Hg]                 Leatha M Hoy  
Work Phone:   
[phone]                          [facility]   
[facility]   
DO  
Work Phone:   
[phone]  
   
                                                    2022   
10:          Heart rate          63 /min             Leatha MORRISON Hoy  
Work Phone:   
[phone]                          [facility]   
[facility]   
DO  
Work Phone:   
[phone]  
   
                                                    2022   
10:                              Systolic blood   
pressure                  126 mm[Hg]                Leatha MORRISON Hoy  
Work Phone:   
[phone]                          [facility]   
[facility] [address]   
DO  
Work Phone:   
[phone]  
   
                                                    2022   
15:                              45 1                Leatha MORRISON Hoy  
Work Phone:   
[phone]                          [facility]   
[facility]   
DO  
Work Phone:   
[phone]  
   
                                        Comment on above:   [id number]   
   
                                                    2022   
13:          Body height         170.18 cm           MD Leatha Jansen  
Work Phone:   
[phone]                          [facility]  
   
                                                    2022   
13:          Body weight         74.84 kg            MD Leatha Jansen  
Work Phone:   
[phone]                          [facility]  
   
                                                    2022   
13:                              Diastolic blood   
pressure                  98 mm[Hg]                 MD Leatha Jansen  
Work Phone:   
[phone]                          [facility]  
   
                                                    2022   
13:          Heart rate          74 /min             MD Leatha Jansen  
Work Phone:   
[phone]                          [facility]  
   
                                                    2022   
13:                              Systolic blood   
pressure                  149 mm[Hg]                MD Leatha Jansen  
Work Phone:   
[phone]                          [facility]  
  
  
  
Encounters  
  
  
                          Encounter Date Encounter Type Care Provider Facility  
   
                                                    Start: 2023  
End: 2023     ambulatory          SARATH MONTES       [facility]  
   
Ambulatory  
   
                                                    Start: 2023  
End: 2023                         Office outpatient visit   
15 minutes                              Sarath Montes   
APRN-CNP  
Work Phone:   
[phone]                          [facility]  
   
                                        Comment on above:   Paroxysmal atrial fi  
brillation (CMS/HCC) (Primary Dx);  
Long term current use of anticoagulant therapy;  
Nonischemic cardiomyopathy (CMS/HCC);  
Overweight with body mass index (BMI) of 27 to 27.9 in adult   
   
                                Start: 2023 ambulatory      Dr. Leatha Jansen                                     Facility:  
   
                                        Start: 2023   Office outpatient vi  
sit   
25 minutes                              Leatha Jansen  
Work Phone:   
[phone]                          [facility] [address] DO  
Work Phone:   
[phone]  
   
                                Start: 2023 Rx Renewal      Leatha TAMIKO Jansen  
Work Phone:   
[phone]                          [facility] [address] DO  
Work Phone:   
[phone]  
   
                                Start: 2023 ambulatory      Dr. Leatha Jansen                                     Facility:[facility]  
   
                                        Start: 2023   Current tobacco non-  
user   
cad cap copd pv dm                      Leatha Jansen  
Work Phone:   
[phone]                          IP-Jbbxifimey-GGT   
[facility] [address] [place]  
Work Phone:   
[phone]  
   
                                Start: 2023 ambulatory      Dr. Leatha Jansen                                     Facility:  
   
                                Start: 2023 Chart Update    Leatha M Som  
Work Phone:   
[phone]                          [facility]   
[facility] [address] DO  
Work Phone:   
[phone]  
   
                                                    Start: 2023  
End: 2023     ambulatory          LAURA Murphy     Facility:[facility]  
   
                          Start: 2023 ambulatory   Sivan Schuler Facility:  
[facility]  
   
                                        Start: 2023   Current tobacco non-  
user   
cad cap copd pv dm                      Leatha TAMIKO Jansen  
Work Phone:   
[phone]                          [facility]Pxmvmanj[facility]QKC   
[facility] [address] [place]  
Work Phone:   
[phone]  
   
                                        Start: 2023   Patient encounter   
procedure                               Leatha Jansen  
Work Phone:   
[phone]                          [facility]Pwhskqvybn[facility]MBB   
[facility] [address] [place]  
Work Phone:   
[phone]  
   
                                                    Start: 2022  
End: 2022           ambulatory                Dr. Donna Hernandes                         Facility:[facility]  
   
                                                    Start: 2022  
End: 2022     ambulatory          DR LEATHA JANSEN      Facility:  
   
                                                    Start: 2022  
End: 2022           ambulatory                Dr. Donna Hernandes                         Facility:[facility]  
   
                                Start: 2022 AUDIT           Leatha Jansen  
Work Phone:   
[phone]                          [facility]-Zuxpxzjnaeyeps-Ygc   
for Perioperative Med  
Work Phone:   
[phone]  
   
                                Start: 2022 ambulatory      Dr. Donna Hernandes                         Facility:[facility]  
   
                                        Start: 2022   Encounter for   
preprocedural laboratory   
examination                             Dr. Donna Hernandes                         [facility]  
   
                                Start: 2022 Message         Leatha Jansen  
Work Phone:   
[phone]                          [facility]   
[facility] [address] DO  
Work Phone:   
[phone]  
   
                                                    Start: 2022  
End: 10-     ambulatory          SHAIKH LUIS ARMANDO ROCK     Facility:[facility]  
   
                                        Start: 2022   Current tobacco non-  
user   
cad cap copd pv dm                      Leatha Jansen  
Work Phone:   
[phone]                          [facility]  
Work Phone:   
[phone]  
   
                                Start: 2022 ambulatory      Dr. Leatha Jansen                                     Facility:[facility]  
   
                                                    Start: 2022  
End: 2022     ambulatory          SHAIKH LUIS ARMANDO ROCK     Facility:[facility]  
   
                                                    Start: 2022  
End: 2022     ambulatory          DR LEATHA JANSEN      Facility:[facility]  
   
                                Start: 2022 Telephone encounter Leatha Jansen  
Work Phone:   
[phone]                          [facility]   
[facility] [address] DO  
Work Phone:   
[phone]  
   
                                                    Start: 2022  
End: 2022     ambulatory          SHAIKH LUIS ARMANDO ROCK     Facility:[facility]  
   
                                Start: 2022 Chart Update    Leatha Jansen  
Work Phone:   
[phone]                          [facility]   
[facility] DO  
Work Phone:   
[phone]  
   
                                Start: 2022 Chart Update    Leatha Jansen  
Work Phone:   
[phone]                          [facility]   
[facility] [address] DO  
Work Phone:   
[phone]  
   
                                                    Start: 2022  
End: 2022     ambulatory          LAURA Murphy     Facility:[facility]  
   
                                                    Start: 2022  
End: 2022                         Admission to same day   
surgery center                          MD Leatha Jansen  
Work Phone:   
[phone]                          [facility]   
Ctr-Electrodiagnostics  
   
                                                    Start: 2022  
End: 2022     ambulatory          LAURA Murphy     Facility:[facility]  
   
                                                    Start: 2022  
End: 2022                         Patient encounter   
procedure                               MD Leatha Jansen  
Work Phone:   
[phone]                          [facility]   
Testing  
   
                                                    Start: 06-  
End: 06-     ambulatory          DR LEATHA JANSEN      Facility:[facility]  
   
                                                    Start: 2022  
End: 2022     ambulatory          SHAIKH LUIS ARMANDO ROCK     Facility:[facility]  
   
                                Start: 2022 ambulatory      Dr. Leatha Jansen                                     Facility:  
   
                                        Start: 2022   Office consultation   
new/estab patient 80 min                Leatha Jansen  
Work Phone:   
[phone]                          [facility]   
[facility] DO  
Work Phone:   
[phone]  
   
                                                    Start: 2022  
End: 2022     ambulatory          LAURA Murphy     Facility:[facility]  
   
                                                    Start: 2022  
End: 2022                         Patient encounter   
procedure                               MD Leatha Jansen  
Work Phone:   
[phone]                          [facility]-Nuc [facility]   
[facility]  
   
                          Start: 2022 ambulatory   DR LEATHA JANSEN Facility  
:[facility]  
   
                                                    Start: 2022  
End: 2022     ambulatory          SHAIKH LUIS ARMANDO ROCK     Facility:[facility]  
   
                          Start: 2022 ambulatory   DR LEATHA JANSEN Facility  
:[facility]  
   
                                                    Start: 2022  
End: 2022     ambulatory          DR LEATHA JANSEN      Facility:[facility]  
   
                                                    Start: 2022  
End: 2022     ambulatory          DR LEATHA JANSEN      Facility:[facility]  
   
                                                    Start: 2022  
End: 2022     ambulatory          DR LEATHA JANSEN      Facility:[facility]  
  
  
  
Procedures  
  
  
                          Date         Procedure    Procedure Detail Performing   
Clinician  
   
                          Start: 2023 Echocardiography              Linda Jansen  
Work Phone:   
[phone]  
   
                          Start: 2022 SARS Antigen (LFIA)              MD Leatha Jansen  
Work Phone:   
[phone]  
   
                                        Start: 2022   Radionuclide myocard  
ial   
perfusion stress study                              MD Leatha Jansen  
Work Phone:   
[phone]  
   
                                                            Catheter ablation of  
   
arrhythmogenic focus                                Leatha MORRISON Som  
Work Phone:   
[phone]  
   
                                       Operation on mouth              Leatha MORRISON  
 Som  
Work Phone:   
[phone]  
   
                                       Procedure on middle ear              Marco MORRISON Som  
Work Phone:   
[phone]  
   
                                       Tonsillectomy              Leatha Vasquezneisha  
Work Phone:   
[phone]  
  
  
  
Plan of Treatment  
  
  
                          Date         Care Activity Detail       Author  
   
                          Start: 2024 Echocardiography Echocardiogram [facility]  
   
                                                    Start: 2024  
End: 2024                         Patient encounter   
procedure                               2024 9:30 AM EDT   
Office Visit [facility] [address]   
[address] [place]   
[address] [phone]   
Agustín Murphy DO   
[address]   
[address] [place]   
[address] [phone]   
(Work) [phone]   
(Fax)                                   [facility]  
   
                          Start: 2023 Creatinine measurement Creatinine Le  
yaw [facility]  
   
                          Start: 2023 Potassium measurement Potassium Leve  
l [facility]  
   
                                                    Start: 2023  
End: 2024                         Basic metabolic 2000 panel   
- Serum or Plasma                       Basic Metabolic Panel   
Lab Routine Paroxysmal   
atrial fibrillation   
(CMS/HCC) Long term   
current use of   
anticoagulant therapy   
Nonischemic   
cardiomyopathy   
(CMS/HCC) Expected:   
2023   
(Approximate), Expires:   
2024                              [facility]  
Work Phone:   
[phone]  
   
                                        Comment on above:   Expected: 2023  
 (Approximate), Expires: 2024   
   
                                        Start: 2023   FUV, Provider: Sarath Martinez, Status: Pen,   
Time: 9:30 AM                           FUV, Provider: Sarath Martinez, Status:   
Pen, Time: 9:30 AM                      [facility]   
[facility] [address]   
DO  
Work Phone:   
[phone]  
   
                          Start: 2023 Influenza vaccination Influenza Vacc  
ine (#1) [facility]  
   
                                        Start: 2023   ECHO, Provider: GARCIA CHEUNG   
[facility] ULTRASOUND   
[id number], Status:   
Pen, Time: 8:45 AM                      ECHO, Provider:   
LETA [facility]   
ULTRASOUND   
[id number], Status:   
Pen, Time: 8:45 AM                      [facility] [address]   
DO  
Work Phone:   
[phone]  
   
                                        Start: 2023   FUV, Provider:   
Agustín Murphy, Status:   
Pen, Time: 10:40 AM                     FUV, Provider:   
Agustín Murphy,   
Status: Pen, Time:   
10:40 AM                                [facility]   
[facility]   
[address] [place]  
Work Phone:   
[phone]  
   
                                        Start: 2023   FUV, Provider:   
Sivan Schuler, Status:   
Pen, Time: 1:00 PM                      FUV, Provider:   
Sivan Schuler, Status:   
Pen, Time: 1:00 PM                      [facility]-Bubwdfzpcn-XGF   
[facility]   
[address] [place]  
Work Phone:   
[phone]  
   
                                        Start: 2023   SURGNONUH, Provider:  
   
George Leonard, Status:   
Pen, Time: 11:00 AM                     SURGNONUH, Provider:   
George Leonard, Status:   
Pen, Time: 11:00 AM                     [facility]osd[facility]axjv[facility]JUC   
[facility]   
[address] [place]  
Work Phone:   
[phone]  
   
                                        Start: 2022   NPV, Provider:   
Donna Hernandes, Status:   
Pen, Time: 12:30 PM                     NPV, Provider:   
Donna Hernandes,   
Status: Pen, Time:   
12:30 PM                                [facility]  
Work Phone:   
[phone]  
   
                                        Start: 2022   NPV, Provider:   
Donna Hernandes, Status:   
Pen, Time: 10:00 AM                     NPV, Provider:   
Donna Hernandes,   
Status: Pen, Time:   
10:00 AM                                [facility]  
Work Phone:   
[phone]  
   
                                        Start: 2022   FUV, Provider: Sarath Martinez, Status: Pen,   
Time: 1:30 PM                           FUV, Provider: Sarath Martinez, Status:   
Pen, Time: 1:30 PM                      [facility]   
DO  
Work Phone:   
[phone]  
   
                                        Start: 2022   [facility], Provider:  
   
George Leonard, Status:   
Pen, Time: 2:00 PM                      [facility], Provider:   
George Leonard, Status:   
Pen, Time: 2:00 PM                      [facility] [address]   
DO  
Work Phone:   
[phone]  
   
                                        Start: 2022   Radionuclide myocard  
ial   
perfusion stress study                  NM david perf SPECT rest   
& str                                   [facility]  
   
                                        Start: 2022   COVID-19 Vaccine (5   
-   
Mixed Product series)                   COVID-19 Vaccine (5 -   
Mixed Product series)                   [facility]  
   
                                        Start: 2021   Pneumococcal Vaccine  
: 65+   
Years (2 - PPSV23 or   
PCV20)                                  Pneumococcal Vaccine:   
65+ Years (2 - PPSV23   
or PCV20)                               [facility]  
   
                                Start: 09- Zoster Vaccines (1 of 2) Zoste  
r Vaccines (1 of   
2)                                      [facility]  
   
                                        Start: 09-   DTaP/Tdap/Td Vaccine  
s (1 -   
Tdap)                                   DTaP/Tdap/Td Vaccines   
(1 - Tdap)                              [facility]  
   
                                        Start: 09-   Hepatitis A Vaccines  
 (1 of   
2 - Risk 2-dose series)                 Hepatitis A Vaccines (1   
of 2 - Risk 2-dose   
series)                                 [facility]  
   
                                        Start: 09-   Diabetes mellitus   
screening                 Diabetes Screening        [facility]  
   
                          Start: 1943 Lipid panel  Lipid Panel  [facility]  
   
                                        Start: 1943   Medicare Annual Well  
ness   
Visit                                   Medicare Annual   
Wellness Visit (AWV)                    [facility]  
  
  
  
Immunizations  
  
  
                      Immunization Date Immunization Notes      Care Provider Alem quinn  
   
                                        11-          Pfizer-BioNTMang[employer]   
COVID-19 Vacc 30   
MCG/0.3ML Intramuscular   
Suspension                                          Leatha Jansen  
Work Phone:   
[phone]                          [facility] [address]   
DO  
Work Phone:   
[phone]  
   
                                        10-          influenza, seasonal,  
   
injectable                                          Leatha Jansen  
Work Phone:   
[phone]                          [facility]  
n  
Work Phone:   
[phone]  
   
                                        10-          pneumococcal conjuga  
te   
vaccine, 13 valent                                  Leatha Jansen  
Work Phone:   
[phone]                          [facility]  
n  
Work Phone:   
[phone]  
   
                                        10-          influenza virus   
vaccine, unspecified   
formulation                                         Sarath Montes   
APRN-CNP  
Work Phone:   
[phone]                          [facility]  
Work Phone:   
[phone]  
   
                                        2021          Pfizer-BioNTech   
COVID-19 Vacc 30   
MCG/0.3ML Intramuscular   
Suspension                                          Leatha Jansen  
Work Phone:   
[phone]                          [facility] [address]   
DO  
Work Phone:   
[phone]  
   
                                        2021          Pfizer-BioNTech   
COVID-19 Vacc 30   
MCG/0.3ML Intramuscular   
Suspension                                          Leatha Jansen  
Work Phone:   
[phone]                          [facility]   
DO  
Work Phone:   
[phone]  
   
                                        2020          Moderna COVID-19   
Vaccine 100 MCG/0.5ML   
Intramuscular   
Suspension                                          Leatha Jansen  
Work Phone:   
[phone]                          [facility]  
n  
Work Phone:   
[phone]  
  
  
  
Payers  
  
  
                          Date         Payer Category Payer        Policy ID  
   
                          2022   Self-pay                  [id number]  
[id number]  
[id number]  
   
                                2008      Medicare        MEDICARE MEDICAR  
E PART A AND   
[id number]   
2008-Present [address]   
[address] [place] [address]             [id number]  
[id number]  
   
                          1960   Medicare                  [id number]   
[id number]  
[id number]  
   
                          1960   Self-pay                  [id number]  
   
                          1960   Unknown                   [id number]   
[id number]  
[id number]  
   
                          1943   Unknown                   [id number]   
[id number]  
   
                          1943   Unknown                   [id number]   
[id number]  
   
                          1943   Unknown                   [id number]   
[id number]  
   
                          1943   Unknown                   [id number]   
[id number]  
   
                          1943   Unknown                   [id number]   
[id number]  
   
                          1943   Unknown                   [id number]   
[id number]  
   
                          1943   Unknown                   [id number]   
[id number]  
   
                          1943   Unknown                   [id number]   
[id number]  
   
                          1943   Unknown                   [id number]   
[id number]  
   
                          1943   Unknown                   [id number]   
[id number]  
   
                          1943   Unknown                   [id number]   
[id number]  
   
                          1943   Unknown                   [id number]   
[id number]  
   
                          1943   Unknown                   [id number]   
[id number]  
   
                          1943   Unknown                   [id number]   
[id number]  
   
                          1943   Unknown                   [id number]   
[id number]  
   
                          1943   Unknown                   [id number]   
[id number]  
   
                          1943   Unknown                   [id number]   
[id number]  
   
                          1943   Unknown                   [id number]   
[id number]  
   
                          1943   Unknown                   [id number]   
[id number]  
   
                          1943   Unknown                   [id number]   
[id number]  
   
                          1943   Unknown                   [id number]   
[id number]  
   
                          1943   Unknown                   [id number]   
[id number]  
   
                          1943   Unknown                   [id number]   
[id number]  
   
                                       Unknown                     
   
                                       Unknown                   [id number]   
[id number]  
   
                                       Unknown                   [id number]   
[id number]  
   
                                       Unknown                   [id number]   
[id number]  
   
                                       Unknown                   [id number]   
[id number]  
  
  
  
Social History  
  
  
                          Date         Type         Detail       Facility  
   
                                                            [facility]  
[facility]                      [facility] if ever smoked    [facility]  
Work Phone:   
[phone]  
   
                          Start: 1945 Sex Assigned At Birth Male         F  
[facility]  
   
                                        Start: 2023   Consumes alcohol   
occasionally                            Consumes alcohol   
occasionally                            [facility]   
[facility] DO  
Work Phone:   
[phone]  
   
                                        Comment on above:   2 cups of coffee del  
ly, pop three times weekly;   
   
                                                            oxycontin;   
   
                                                            quit somking at age   
34;   
   
                                                            oxycodone;   
   
                                        Start: 2023   Tobacco smoking stat  
[facility]                      Ex-smoker                 [facility]  
   
                                       History of tobacco use Current smoker [facility]  
Work Phone:   
[phone]  
   
                                       History of tobacco use Cigarette Smoker U  
[facility]  
Work Phone:   
[phone]  
   
                                        Start: 2023   Tobacco use and   
exposure                                Smokeless tobacco   
non-user                                [facility]  
Work Phone:   
[phone]  
   
                                Start: 2023 Alcohol intake  Lifetime non-d  
valentin   
(finding)                               [facility]  
Work Phone:   
[phone]  
   
                          Start: 2023 Tobacco use panel              [facility]  
Work Phone:   
[phone]  
   
                          Start: 1945 Sex Assigned At Birth Not on file  U  
[facility]  
Work Phone:   
[phone]  
   
                                                    Start: 10-  
End: 2023                         Exposure to SARS-CoV-2   
(event)                   Not sure                  [facility]  
  
  
  
Clinical Notes 2022 to 2023  
     Assessment & Plan Note - DANG Mullins - 2023 12:24 PM   
EDTAssessment & Plan Note - DANG Mullins - 2023 12:24 PM 
EDTPatient Instructions  
  
                                Note Date & Type Note            Facility  
   
                                                    2023 Evaluation +   
Plan note                               Associated Problem(s): Overweight   
with body mass index (BMI) of 27 to   
27.9 in adult  
Formatting of this note might be   
different from the original.  
Reviewed the merits of healthy   
lifestyle choices on overall   
cardiovascular health.  
  
Electronically signed by DANG Mullins at 2023 12:24   
PM EDT  
                                        [facility]  
Work Phone:   
[phone]  
   
                                                    2023 Evaluation +   
Plan note                               Associated Problem(s): Long term   
current use of anticoagulant   
therapy  
Formatting of this note might be   
different from the original.  
CHADS VASc 4 anticoagulated full   
dose Xarelto  
Currently denies bleeding diatheses  
Needs annual labs for creatinine  
Electronically signed by DANG Mullins at 2023 12:24   
PM EDT  
                                        [facility]  
Work Phone:   
[phone]  
   
                                                    2023 Evaluation +   
Plan note                               Associated Problem(s): Cardiac and   
Vasculature  
Formatting of this note might be   
different from the original.  
May 2022 MPI no ischemia  
Daily activity greater than 4 METS   
without concerning symptoms  
Electronically signed by DANG Mullins at 2023 12:24   
PM EDT  
                                        [facility]  
Work Phone:   
[phone]  
   
                                                    2023 Miscellaneous   
Notes                                   Associated Problem(s): Overweight   
with body mass index (BMI) of 27 to   
27.9 in adult  
Formatting of this note might be   
different from the original.  
Reviewed the merits of healthy   
lifestyle choices on overall   
cardiovascular health.  
  
Electronically signed by DANG Mullins at 2023 12:24   
PM EDT  
Associated Problem(s): Long term   
current use of anticoagulant   
therapy  
Formatting of this note might be   
different from the original.  
CHADS VASc 4 anticoagulated full   
dose Xarelto  
Currently denies bleeding diatheses  
Needs annual labs for creatinine  
Electronically signed by DANG Mullins at 2023 12:24   
PM EDT  
Associated Problem(s): Cardiac and   
Vasculature  
Formatting of this note might be   
different from the original.  
May 2022 MPI no ischemia  
Daily activity greater than 4 METS   
without concerning symptoms  
Electronically signed by DANG Mullins at 2023 12:24   
PM EDT  
Associated Problem(s): Nonischemic   
cardiomyopathy (CMS/HCC)  
Formatting of this note might be   
different from the original.  
May 2022 TTE EF 40-45% (afib)  
  
May 2023 TTE  
LVEF 50-55%  
LA mild/moderate  
RA mod/severe dilated  
MR mild  
RVSP 36  
  
To continue GDMT  
Electronically signed by DANG Mullins at 2023 12:23   
PM EDT  
Associated Problem(s): Atrial   
fibrillation (CMS/HCC)  
Formatting of this note might be   
different from the original.  
Dec 2022 RFA ablation  
2023 DCC  
Denies recurrent symptoms  
Electronically signed by DANG Mullins at 2023 12:22   
PM EDT  
documented in this encounter            [facility]  
Work Phone:   
[phone]  
   
                                                    2023 Evaluation +   
Plan note                               Associated Problem(s): Nonischemic   
cardiomyopathy (CMS/HCC)  
Formatting of this note might be   
different from the original.  
May 2022 TTE EF 40-45% (afib)  
  
May 2023 TTE  
LVEF 50-55%  
LA mild/moderate  
RA mod/severe dilated  
MR mild  
RVSP 36  
  
To continue GDMT  
Electronically signed by DANG Mullins at 2023 12:23   
PM T  
                                        [facility]  
Work Phone:   
[phone]  
   
                                                    2023 Evaluation +   
Plan note                               Associated Problem(s): Atrial   
fibrillation (CMS/HCC)  
Formatting of this note might be   
different from the original.  
Dec 2022 RFA ablation  
2023 DCC  
Denies recurrent symptoms  
Electronically signed by DANG Mullins at 2023 12:22   
PM EDT  
                                        [facility]  
Work Phone:   
[phone]  
   
                                                    2023 History of   
Present illness Narrative               Formatting of this note is   
different from the original.  
Chief Complaint  
 Doing just fine   
  
Reason for Visit  
Routine 6-month follow-up  
Patient presents to the office   
today for outpatient follow-up for   
atrial fibrillation,   
anticoagulation, cardiomyopathy.  
Last evaluated in clinic by Dr. Murphy May 2023.  
  
Presents today ambulatory with   
steady gait.  
Accompanied by spouse  
Patient denies any hospitalizations   
or significant changes to interval   
medical history since last office   
follow-up.  
No routine PCP follow-up, no labs   
this year.  
  
History of Present Illness  
Patient is an extremely pleasant   
78-year-old gentleman who presents   
without voice cardiovascular   
complaints. Remains aerobically   
active where he walks 2 miles   
daily, over the summer was able to   
do yard work, he has stairs that he   
needs to go up and down at home. He   
denies any type of exertional   
complaints. He ambulated in from   
the parking lot without concerns.  
  
He reports being highly symptomatic   
in atrial fibrillation and denies   
any recurrence.  
Remains anticoagulated. Need to   
check renal function for creatinine   
clearance.  
  
Improved LVEF 50 to 55%. Discussed   
rationale behind remaining on GDMT.  
  
Patient reports that overall has no   
complaint(s) of chest pain,   
exertional chest   
pressure/discomfort, irregular   
heart beat, and palpitations  
  
Daily activity: aerobically active,   
walks 2 miles  
Denies any change in exercise   
capacity or functional tolerance   
since last office visit.  
  
Overall patient is pleased with   
current state of cardiovascular   
health. At this time there are no   
indications for additional   
cardiovascular testing or need for   
medication changes.  
  
Discussed the dynamic nature of   
coronary artery disease and the   
importance of seeking medical   
attention if new symptoms arise.  
  
Review of Systems  
Cardiovascular: Negative for chest   
pain, dyspnea on exertion,   
irregular heartbeat, leg swelling,   
near-syncope, orthopnea,   
palpitations, paroxysmal nocturnal   
dyspnea and syncope.  
  
  
Visit Vitals  
/62 (BP Location: Right arm,   
Patient Position: Sitting)  
Pulse 62  
Ht 1.702 m (5' 7 )  
Wt 79.4 kg (175 lb)  
BMI 27.41 kg/m  
Smoking Status Former  
BSA 1.94 m  
  
Physical Exam  
Vitals and nursing note reviewed.  
Constitutional:  
Appearance: Normal appearance.  
Cardiovascular:  
Rate and Rhythm: Normal rate and   
regular rhythm.  
Heart sounds: Normal heart sounds.  
Pulmonary:  
Effort: Pulmonary effort is normal.  
Breath sounds: Normal breath   
sounds.  
Musculoskeletal:  
Cervical back: Full passive range   
of motion without pain.  
Right lower leg: No edema.  
Left lower leg: No edema.  
Skin:  
General: Skin is cool.  
Neurological:  
Mental Status: He is alert and   
oriented to person, place, and   
time.  
Psychiatric:  
Attention and Perception: Attention   
normal.  
Mood and Affect: Mood normal.  
Behavior: Behavior is cooperative.  
  
  
No Known Allergies  
  
Current Outpatient Medications  
Medication Instructions  
carvedilol (COREG) 3.125 mg, oral,   
2 times daily  
losartan (Cozaar) 25 mg tablet 1   
tablet, oral, Daily  
rivaroxaban (XARELTO) 20 mg, oral,   
Daily, Take with food.  
spironolactone (ALDACTONE) 25 mg,   
oral, Daily  
  
Assessment:  
  
Atrial fibrillation (CMS/HCC)  
Dec 2022 RFA ablation  
2023 DCC  
Denies recurrent symptoms  
  
Nonischemic cardiomyopathy   
(CMS/HCC)  
May 2022 TTE EF 40-45% (afib)  
  
May 2023 TTE  
LVEF 50-55%  
LA mild/moderate  
RA mod/severe dilated  
MR mild  
RVSP 36  
  
To continue GDMT  
  
Cardiac and Vasculature  
May 2022 MPI no ischemia  
Daily activity greater than 4 METS   
without concerning symptoms  
  
Long term current use of   
anticoagulant therapy  
CHADS VASc 4 anticoagulated full   
dose Xarelto  
Currently denies bleeding diatheses  
Needs annual labs for creatinine  
  
Overweight with body mass index   
(BMI) of 27 to 27.9 in adult  
Reviewed the merits of healthy   
lifestyle choices on overall   
cardiovascular health.  
  
Plan:  
  
Through informed decision making   
process incorporating patients   
unique circumstances, the following   
treatment plan will be initiated:  
  
1. Prescription drug management of   
cardiovascular medication for   
efficacy, adherence to treatment,   
side effect assessment and   
polypharmacy. Current treatment   
clinically warranted and to   
continue without modifications.  
  
2. Chem6  
  
3. Return for follow-up; in the   
interim, contact the office if new   
symptoms arise.  
Dr. Murphy 6 months  
  
Sarath Montes MSN, APRN-CNP,   
PMHNP-[facility]  
  
Please excuse any errors in grammar   
or translation related to this   
dictation. Voice recognition   
software was utilized to prepare   
this document.  
  
Electronically signed by DANG Mullins at 2023 12:29   
PM EDT  
documented in this encounter            [facility]  
Work Phone:   
[phone]  
   
                                        2023 Instructions   
  
  
DANG Mullins -   
2023 9:30 AM EDTFormatting of   
this note might be different from   
the original.  
Please bring all medicines,   
vitamins, and herbal supplements   
with you when you come to the   
office.  
  
Prescriptions will not be filled   
unless you are compliant with your   
follow up appointments or have a   
follow up appointment scheduled as   
per instruction of your physician.   
Refills should be requested at the   
time of your visit.  
  
PLAN:  
Through informed decision making   
process incorporating patients   
unique circumstances, the following   
treatment plan will be initiated:  
  
1. Prescription drug management of   
cardiovascular medication for   
efficacy, adherence to treatment,   
side effect assessment and   
polypharmacy. Current treatment   
clinically warranted and to   
continue without modifications.  
  
2. Chem6  
  
3. Return for follow-up; in the   
interim, contact the office if new   
symptoms arise.  
Dr. Murphy 6 months  
  
Electronically signed by DANG Mullins at 2023 10:11   
AM EDT  
  
documented in this encounter            [facility]  
Work Phone:   
[phone]  
   
                                        2022 Note     History of Present I  
llness:  
History Present Illness:  
Reason for surgery: Afib ablation  
HPI:  
81 year old with at night of   
rotator cuff, and cocaine use in   
2017 , oxycodone  
abuse stopped 6 months ago, was   
diagnosed with NICM with ef of 45%,   
in sitting  
of afib , ( tachymedicated   
cardiomyopathy)  
  
Treatment of his AF includes   
carvedilol. Pt has not required   
DCCV or AADs.  
Symptoms of his AF include   
palpitations and DELEON.  
  
Pt recently followed up with his   
PCP in the end of April/early May   
and pt was  
noted to be in AF. Pt had a history   
of irregular heartbeat but no   
diagnosis of  
AF. Pt was started on Eliquis.  
  
Pt established with Dr Murphy with   
the plan for DCCV. His DCCV was   
cancelled  
because pt had COVID. Pt is not on   
ay medications for rate control. Pt   
has  
stopped all his medications almost   
1 week ago including his Eliquis.   
pt has a  
history of Oxycodone abuse which he   
was started on initially in    
for a  
rotator cuff injury. Pt has been   
obtaining the medications on the   
street not  
through prescription. Pt has a   
recent accidental overdose which   
required ED  
visit an reversal with Narcan. Pt   
was take to rehab by his daughter   
about 1  
week ago but left the same day.  
  
  
The indications, risks, benefits,   
alternatives, and details of the   
procedure  
were explained to the patient who   
expressed understanding of the   
risks  
including but are not limited to:   
pain, bleeding, and infection for   
which the  
risk is less than 1%. More serious   
risks, including cardiac   
perforation and  
tamponade, vascular trauma,   
pulmonary vein stenosis,   
atrio-esophageal fistula,  
diaphragmatic paralysis,   
life-threatening arrhythmia, need   
for permanent  
pacemaker, stroke, heart attack,   
and/or death, for which the overall   
risk  
ranges 0.1-1%. After all questions   
were answered, the patient provided   
informed  
and written consent.  
  
patient had Afib abd CTI line   
ablation , with no complication ,   
he will be  
admitted because it is a late case.   
will need to get his AC tonight ,   
AC  
can't be held without calling EP   
fellow on call.  
  
Allergies:  
Allergies:  
No Known Allergies:  
  
Home Medication Review:  
Home Medications Reviewed: yes  
  
Impression/Procedure:  
Impression and Planned Procedure:   
afib ablation  
  
  
ERAS (Enhanced Recovery After   
Surgery):  
ERAS Patient: no  
  
  
Physical Exam by System:  
  
Respiratory/Thorax: Patent airways,   
CTAB, normal breath sounds with   
good chest  
expansion, thorax symmetric  
Cardiovascular: Regular, rate and   
rhythm, no murmurs, 2+ equal pulses   
of the  
extremities, normal S 1and S 2  
  
Airway/Sedation Assessment:  
Assmentment by AnesthesiaSee   
anesthesia airway/sedation   
assessment.  
  
Consent:  
COVID-19 Consent:  
COVID-19 Risk ConsentSurgeon has   
reviewed key risks related to the   
risk of  
stan COVID-19 and if they   
contract COVID-19 what the risks   
are.  
  
Coronavirus Attestation of Need for   
Surgery:  
COVID-19 Surgery / Procedure   
Attestation: Select all criteria   
that applyRisk  
of metastasis or progression of   
staging if delayed  
  
Attestation:  
Note Completion:  
I am a: Resident/Fellow  
Attending AttestationI saw and   
evaluated the patient. I personally   
obtained  
the key and critical portions of   
the history and physical exam or   
was  
physically present for key and   
critical portions performed by the  
resident/fellow. I reviewed the   
resident/fellows documentation and   
discussed  
the patient with the   
resident/fellow. I agree with the   
resident/fellows  
medical decision making as   
documented in the note.  
I personally evaluated the patient   
on13-Dec-2022  
  
  
  
Electronic Signatures:  
Donna Hernandes) (Signed   
23-Dec-2022 09:05)  
Co-Signer: History of Present   
Illness, Allergies, Home Medication   
Review,  
Impression/Procedure, ERAS,   
Physical Exam, Consent, Note   
Completion  
Jessica Johnson (Resident))   
(Signed 13-Dec-2022 18:57)  
Authored: History of Present   
Illness, Allergies, Home Medication   
Review,  
Impression/Procedure, ERAS,   
Physical Exam, Consent, Note   
Completion  
  
  
Last Updated: 23-Dec-2022 09:05 by   
Donna Hernandes)                   [facility] [place]  
   
                                        2022 Note     Pre-procedure Verifi  
cation and Time   
Out:  
Pre-Procedure Verification and Time   
Out:  
Procedure Locationprocedure area  
HUDDLE - Pre-procedure   
Verificationcompleted  
TIME OUT - Final   
Verificationcompleted immediately   
prior to procedure start  
DEBRIEFcompleted  
  
General Information:  
  
Anesthesia Critical Care:   
Non-Anesthesia  
Date/Time of Procedure: 13-Dec-2022   
18:40  
Post-Procedure Diagnosis:   
successful afib ablation  
successful CTI line ablation  
Procedure Name: Afib ablation  
Findings: grossly normal anatomy  
Procedure performed by: Dr. Hernandes  
Assistant(s): Jessica johnson  
Estimated Blood Loss (mL): 10  
Specimen: no  
Informed Consent: written consent   
obtained  
  
Procedure Details:  
  
Procedure Details:  
Radiofrequency ablation for atrial   
fibrillation  
  
The indications, risks, benefits,   
alternatives, and details of the   
procedure  
were explained to the patient who   
expressed understanding of the   
risks  
including but are not limited to:   
pain, bleeding, and infection for   
which the  
risk is less than 1%. More serious   
risks, including cardiac   
perforation and  
tamponade, vascular trauma,   
pulmonary vein stenosis,   
atrio-esophageal fistula,  
diaphragmatic paralysis,   
life-threatening arrhythmia, need   
for permanent  
pacemaker, stroke, heart attack,   
and/or death, for which the overall   
risk  
ranges 0.1-1%. After all questions   
were answered, the patient provided   
informed  
and written consent.  
  
The patient presented to the EP lab   
in the fasting state and was in   
Afib. The  
patient was prepped and draped per   
usual sterile protocol. Access was   
obtained  
in the right femoral vein using the   
modified Seldinger technique with   
the aid  
of ultrasound. Three sheaths (8Fr,   
8.5Fr, 8.5Fr) were placed in the   
right  
femoral vein. Intracardiac echo was   
used to evaluate for any baseline   
effusion.  
There was no pericardial effusion   
at baseline. A Split Duodeca was   
placed in  
the HRA and CS. The proximal sheath   
was then exchanged for a long   
steerable  
Hasty sheath over a wire.   
Transseptal catheterization was   
performed with a  
transseptal radiofrequency Hasty   
needle under fluoroscopic and   
intracardiac  
echocardiographic guidance.  
  
A left atrial voltage map was   
constructed using Carto3 and a   
PentaRay mapping  
catheter. There were very low left   
atrial voltages on mapping sparing   
the  
SAMSON. There were potentials at all   
four pulmonary veins. A [employer]   
Peralta  
ST-SF catheter was used for   
ablation. Isolation of all 4 PVs   
was achieved with  
bilateral RF wide-area   
circumferential ablation (WACA)   
lesions and carinal  
lines. There were no potentials at   
the pulmonary veins after ablation,   
Anterior  
wall was isolated by creating 2   
lines from WACA to mitral valve   
annuals,  
patient develope 2:1 arial flutter   
by touching the inferior wall TCL   
was 240ms  
it was poroved to be Lt sided   
flutter by entrainment, it was   
terminated by  
ablating on the inferioir wall ,   
then he develop CTI clockwise   
dependent  
flutter TCL 320ms which was   
terminated by CTI line ablation.   
bidirectional  
block was achieved with >240 ms   
blocks . Adjuvant lesions were   
applied to the  
posterolateral mitral valve annulus   
(Vein of Hermelindo region),   
anteroseptal  
mitral valve annulus, posteroseptal   
mitral valve annulus, SVC-RA septal  
junction, and low mian   
terminalis. The patient was in NSR   
rhythm at the end  
of the case.  
  
**The patient was successfully   
cardioverted with restoration of   
sinus rhythm.  
  
  
There was no pericardial effusion   
on intracardiac echo at the end of   
the  
procedure. The long sheath was   
exchanged for a short 8.5 Fr sheath   
over a wire.  
All sheaths were removed and   
hemostasis was achieved using   
Vascade at all three  
intravenous access sites and manual   
pressure. Throughout the procedure,   
the  
heparin infusion rate was adjusted   
to maintain an ACT between 350-400   
sec.  
Protamine was given to reverse   
anticoagulation at procedure end.  
  
Successful ablation for atrial   
fibrillation with RF isolation of   
all four  
pulmonary veins and adjuvant   
lesions. The patient tolerated the   
procedure well  
with no immediate complications.  
  
  
Plan:  
1. Return home today.  
2. Bedrest for 2 hours then   
ambulate as tolerated.  
3. Continue current home   
medications as prescribed.  
4. Pantoprazole for 4 weeks.  
Tolerance: good  
Complications: none  
  
Attestation:  
Note Completion:  
I am a:Resident/Fellow  
Attending AttestationI was present   
for the entire procedure  
  
  
  
Electronic Signatures:  
Donna Hernandes) (Signed   
23-Dec-2022 09:04)  
Co-Signer: Pre-procedure   
Verification and Time Out, General   
Information,  
Procedure Details, Note Completion  
Jessica Johnson (Resident))   
(Signed 13-Dec-2022 18:47)  
Authored: Pre-procedure   
Verification and Time Out, General   
Information,  
Procedure Details, Note Completion  
  
  
Last Updated: 23-Dec-2022 09:04 by   
Donna Hernandes)                   [facility] [place]  
   
                                        2022 Note     Clinical Note - Phar  
daryl v2:  
Education:  
Document TopicMedication Education  
Is This Intervention Medication   
Reconciliation Relatedyes  
Time [id number] minutes  
Learnerpatient  
Barriersnone  
Methodverbal  
Additional NotesSOURCES USED TO   
CONFIRM HOME MEDICATION LIST  
- Patient with prompting  
- Fill history verified online   
using PostedIn  
- OARRS - no pertinent hx  
  
CRITICAL CATH LAB MEDS  
Aspirin: not taking  
Statin: not prescribed  
P2Y12 inhibitor: not prescribed  
Anticoagulant: Eliquis 5 mg  
  
MEDICATION CHANGES  
- Changed Protonix to Prilosec PRN  
  
MEDICATIONS ADDED  
- Lasix  
  
MEDICATION LIST  
Drug Name: eplerenone 25 mg oral   
tablet  
Instructions: 1 tab(s) oral once a   
day  
  
Drug Name: losartan 25 mg oral   
tablet  
Instructions: 1 tab(s) oral once a   
day  
  
Drug Name: multivitamin Multiple   
Vitamins oral tablet  
Instructions: 1 tab(s) oral once a   
day  
  
Drug Name: apixaban 5 mg oral   
tablet  
Instructions: 1 tab(s) orally 2   
times a day  
  
Drug Name: carvedilol 6.25 mg oral   
tablet  
Instructions: 1 tab(s) orally 2   
times a day (with meals)  
  
Drug Name: furosemide 40 mg oral   
tablet  
Instructions: 1 tab(s) orally once   
a day  
  
Drug Name: acetaminophen 500 mg   
oral tablet  
Instructions: 2 tab(s) orally every   
6 hours, As Needed  
  
Please reach out via DocHalo for   
questions  
Precious Miller, [facility]   PGY1   
Pharmacy Resident  
  
Allergy:  
Allergies Summary  
No documented data.  
  
  
  
Electronic Signatures:  
Raulito Bernard (PharmD) (Signed   
13-Dec-2022 13:45)  
Authored: Education, Allergy  
Trisha Villarreal ([facility]) (Signed   
13-Dec-2022 15:25)  
Co-Signer: Education, Allergy  
  
Last Updated: 13-Dec-2022 15:25 by   
Trisha Villarreal ([facility])                  [facility] [place]  
   
                                        2022 Note     PROCEDURE: XR SHOULD  
ER RT 2V or >  
HISTORY: Shoulder joint pain ,   
chronic  
COMPARISON: None.  
FINDINGS:  
BONES:Narrowing of the   
acromioclavicular joint and large   
undersurface  
osteophytes. Unremarkable humeral   
head and glenohumeral joint.  
SOFT TISSUES:Corticated   
calcification cephalad to the   
humerus near insertion of  
superior rotator cuff.  
EFFUSION:None visible.  
OTHER: Negative.  
IMPRESSION:  
1. Marked degenerative changes of   
the acromioclavicular joint which   
would  
predispose to rotator cuff injury.  
2. Calcification near region of   
superior rotator cuff tendon   
insertion into the  
humeral head suggestive of calcific   
tendinitis.  
Electronically authenticated by:   
MEHRDAD ZIMMER Date: 2022   
16:26                                   [facility]  
   
                                                    Chief complaint Narrative -   
Reported                                ENMANUEL ARRINGTON is being seen for a   
consultation for HOY ABN   
ECHO.76-year-old gentleman seen in   
cardiology consultation at the   
request of Dr. Jansen for abnormal   
echocardiogram and stress imaging.   
He complains of exertional dyspnea   
he states that occurred around or   
just prior to his first booster   
shot for COVID. He also admits that   
he probably had COVID illness but   
was never tested earlier in the   
year.His chief complaints right now   
are occasional lower extremity   
edema right greater than left that   
is improved with transient use of   
diuretics administered by Dr. Jansen.   
Exertional dyspnea with any type of   
walking or walking up stairs or   
work related activities. He denies   
angina, true heart failure   
hospitalizations, syncope. HeHe   
does admit to history of irregular   
heartbeat dating back to  when   
he was in [place] details of   
which are unknown. 4 weeks ago when   
in Dr. Jansen's office for the first   
time in many years, ECG was   
performed revealing atrial   
fibrillation with controlled   
ventricular response. Subsequent   
stress perfusion imaging and   
echocardiography were performed at   
[facility] revealing normal   
perfusion scan, but atrial   
fibrillation so no ejection   
fraction estimate was performed on   
perfusion scan. Echocardiogram   
revealed reduced left ventricular   
function with ejection fraction   
estimated to be 40 to 45%.He was   
then placed on Eliquis for his A.   
fib, and it appears that he has   
been on Eliquis for 2 to 3 weeks.   
Patient and his wife will   
corroborate when his start date for   
Eliquis was and return that   
information.My estimation is that   
he has left ventricular systolic   
heart failure likely secondary to   
chronic atrial fibrillation   
(duration of which is unknown). It   
is worth a shot at least to try   
cardioversion at least once in   
order to restore   
rhythm.Recommendations, informed   
decision-making process, risk   
benefits alternatives of discussed   
with patient. We will proceed with   
guideline directed medical   
therapies including institution of   
carvedilol, losartan, eplerenone,   
and elective cardioversion in the   
end of  at least 6 weeks after   
its initiation of Eliquis, and   
follow-up thereafterwards. We may   
need to institute loop diuretics if   
necessary. Otherwise if atrial   
fibrillation continues to be   
problematic I will have to refer   
him either to general cardiology or   
electrophysiology in the future   
otherwise I will be happy to take   
care of the left ventricular   
function issues at this moment.         [facility]   
[address] DO  
Work Phone:   
[phone]  
   
                                                    Chief complaint Narrative -   
Reported                                ENMANUEL ARRINGTON is being seen for a   
cardiovascular evaluation of atrial   
fibrillation.                           [id number]  
Work Phone:   
[phone]  
   
                                                    Chief complaint Narrative -   
Reported                                ENMANUEL ARRINGTON is being seen for a   
cardiovascular evaluation of atrial   
fibrillation.                           [id number]   
[address] [place]  
Work Phone:   
[phone]  
   
                                Evaluation note No assessment information availa  
[facility]  
Work Phone:   
[phone]  
  
  
  
                                                    Evaluation note   
  
  
  
                                                    Diagnosis  
   
                                                      
  
  
Paroxysmal atrial fibrillation (CMS/HCC)- Primary  
  
  
Atrial fibrillation  
   
                                                      
  
  
Long term current use of anticoagulant therapy  
   
                                                      
  
  
Nonischemic cardiomyopathy (CMS/HCC)  
  
  
Other primary cardiomyopathies  
   
                                                      
  
  
Overweight with body mass index (BMI) of 27 to 27.9 in adult  
  
documented in this encounter  
[facility]  
Work Phone: [phone] of Present illness Narrative* Enmanuel Arrington is a 
  75 y/o male referred by Dr Murphy for evaluation on AF.  
* PMH includes NICM, R should rotator cuff injury, cocaine abuse x 17 yrs (rehab
   in [place] now sober), oxycodone abuse (started in ) w/ accidental overdose 
  and AF.  
* Treatment of his AF includes carvedilol. Pt has not required DCCV or AAD s.  
* Symptoms of his AF include palpitations and DELEON.  
* Pt recently followed up with his PCP in the end of April/early May and pt was 
  noted to be in AF. Pthad a history of irregular heartbeat but no diagnosis of 
  AF. Pt was started on Eliquis.  
* Pt established with Dr Murphy with the plan for DCCV. His DCCV was cancelled 
  because pt had COVID.Pt is not on ay medications for rate control. Pt has 
  stopped all his medications almost 1 week ago including his Eliquis. pt has a 
  history of Oxycodone abuse which he was started on initially in for a 
  rotator cuff injury. Pt has been obtaining the medications on the street not 
  through prescription. Pt has a recent accidental overdose which required ED 
  visit an reversal with Narcan. Pt was take to rehab by his daughter about 1 
  week ago but left the same day.  
* Pt reports that he has been sober for 6 days. Pt presents today to discuss AF 
  ablation.  
* Echo 2022: EF 40-45%, septal akinesis, LA mildly dilated  
* Stress test 2022: no ischemia or myocardial infarction  
[facility]  
Work Phone: [phone] of Present illness Narrative* Enmanuel Arrington is a 
  75 y/o male referred by Dr Murphy for evaluation on AF.  
* PMH includes NICM, R should rotator cuff injury, cocaine abuse x 17 yrs (rehab
   in [place] now sober), oxycodone abuse (started in ) w/ accidental overdose 
  and AF.  
* Treatment of his AF includes carvedilol. Pt has not required DCCV or AAD s.  
* Symptoms of his AF include palpitations and DELEON.  
* Pt recently followed up with his PCP in the end of April/early May and pt was 
  noted to be in AF. Pthad a history of irregular heartbeat but no diagnosis of 
  AF. Pt was started on Eliquis.  
* Pt established with Dr Murphy with the plan for DCCV. His DCCV was cancelled 
  because pt had COVID.Pt is not on ay medications for rate control. Pt has 
  stopped all his medications almost 1 week ago including his Eliquis. pt has a 
  history of Oxycodone abuse which he was started on initially in for a 
  rotator cuff injury. Pt has been obtaining the medications on the street not 
  through prescription. Pt has a recent accidental overdose which required ED 
  visit an reversal with Narcan. Pt was take to rehab by his daughter about 1 
  week ago but left the same day.  
* Pt reports that he has been sober for 6 days. Pt presents today to discuss AF 
  ablation.  
* Echo 2022: EF 40-45%, septal akinesis, LA mildly dilated  
* Stress test 2022: no ischemia or myocardial infarction  
[facility] [address] [place]  
Work Phone: [phone] of Present illness Narrative* Enmanuel Arrington is a 
  78 y/o male initially referred by Dr Murphy for evaluation on AF.  
* PMH includes NICM, R should rotator cuff injury, cocaine abuse x 17 yrs (rehab
   in [place] now sober), oxycodone abuse (started in ) w/ accidental overdose 
  and AF.  
* Treatment of his AF includes carvedilol. Pt has not required DCCV or AAD s.  
* Symptoms of his AF include palpitations and DELEON.  
* Pt recently followed up with his PCP in the end of April/early May and pt was 
  noted to be in AF. Pthad a history of irregular heartbeat but no diagnosis of 
  AF. Pt was started on Eliquis.  
* Pt established with Dr Murphy with the plan for DCCV. His DCCV was cancelled 
  because pt had COVID.. Pt has a history of Oxycodone abuse which he was 
  started on initially in  for a rotator cuff injury. Pt has been obtaining 
  the medications on the street not through prescription. Pt had a recent
  accidental overdose which required ED visit an reversal with Narcan. Pt was 
  take to rehab by his daughter about 1 week ago but left the same day.  
* Pt reports that he has been sober since August  
* Echo 2022: EF 40-45%, septal akinesis, LA mildly dilated  
* Stress test 2022: no ischemia or myocardial infarction  
* Now s/p PVI and adjuvant lesion RFA with Dr. Hernandes 2022  
* Patient unfortunately caught a URI around [place] and also experienced a 
  fever. He was started onantibiotics around  and has a couple of more days
   still before they are complete.  
* ECG 2023 Atypical Aflutter HR 95 bpm.  
* TODAY Patient presents for 3 week follow up post ablation. He denies any 
  arrhythmia symptoms. He isstill coughing, especially at night due to his URI, 
  but the SOB is improving after the antibiotics.He denies any palpitations, 
  syncope, heart racing, and chest pain. He denies any bleeding/bruising/s
  welling at the right groin site. His wife is present with him and says he is 
  taking all his medication properly. She would like a refill on the Coreg with 
  the correct dosage so she doesn't have to cut the pill in half  
[facility] [address] [place]  
Work Phone: [phone] of Present illness Narrative* Enmanuel Arrington is a 
  78 y/o male initially referred by Dr Murphy for evaluation on AF.  
* PMH includes NICM, R should rotator cuff injury, cocaine abuse x 17 yrs (rehab
   in [place] now sober), oxycodone abuse (started in ) w/ accidental overdose 
  and AF.  
* Treatment of his AF includes carvedilol. Pt has not required DCCV or AAD s.  
* Symptoms of his AF include palpitations and DELEON. .  
* Pt established with Dr Murphy with the plan for DCCV. His DCCV was cancelled 
  because pt had COVID.. Pt has a history of Oxycodone abuse which he was 
  started on initially in  for a rotator cuff injury. Pt has been obtaining 
  the medications on the street not through prescription. Pt had a recent
  accidental overdose which required ED visit an reversal with Narcan. Pt was 
  take to rehab by his daughter about 1 week ago but left the same day.  
* Pt reports that he has been sober since August  
* Echo 2022: EF 40-45%, septal akinesis, LA mildly dilated  
* Stress test 2022: no ischemia or myocardial infarction  
* Now s/p PVI and adjuvant lesion RFA with Dr. Hernandes 2022  
* Patient unfortunately caught a URI around [place] and also experienced a 
  fever. He was started onantibiotics around   
* ECG 2023 Atypical Aflutter HR 95 bpm.  
* S/P DCCV with Dr. Murphy  
* ECG 3/27/2023 NSR with first degree AV block, HR 82 bpm  
* TODAY Patient presents for 3 month follow up post Afib ablation. He is feeling
   good. He denies any arrhythmia symptoms since his cardioversion. Per his 
  wife, he still gets tired throughout the day and takes daily naps. But 
  overall, he no longer feels palpitations or DELEON.  
[facility] [address] [place]  
Work Phone: [phone] for referral (narrative)* Consultation 
  (Routine) - Authorized  
  
                          Specialty    Diagnoses / Procedures Referred By Contac  
t Referred To Contact  
   
                                        Cardiology            
  
  
Diagnoses  
  
  
Paroxysmal atrial   
fibrillation (CMS/HCC)  
  
  
Long term current use of   
anticoagulant therapy  
  
  
  
Procedures  
  
  
Follow Up In Cardiology                   
  
  
Sarath Montes APRN-CNP  
  
  
[address]  
  
  
[place] [address]  
  
  
Phone: [phone]  
  
  
Fax: [phone]                         
  
  
Agustín Murphy,   
  
  
[address]  
  
  
[facility] [address]  
  
  
[place] [address]  
  
  
Phone: [phone]  
  
  
Fax: [phone]  
  
  
  
                    Referral ID Status    Reason    Start Date Expiration Date V  
isits   
Requested                               Visits   
Authorized  
   
                [id number] Authorized         2023 10/31/2024 1       1  
  
  
  
  
Electronically signed by Sarath HAMLIN-CNP at 2023 10:12 AM [facility]  
Work Phone: [phone]  
  
Chief Complaint and Reason for Visit  
  
  
                                        Chief Complaint     dyspnea palpitations  
 chest pain afib  
  
  
  
                                        Chief Complaint     dyspnea palpitations  
 chest pain afib  
Afib  
  
  
  
                                        Chief Complaint     dyspnea palpitations  
 chest pain afib  
Afib  
Afib  
  
  
  
Advance Directives  
No Advanced Directives Records Found  
  
                                Advance Directive Response        Recorded Date/  
Time  
   
                                Advance Directives No              ,   
022 9:48am  
  
  
  
Family History  
No Family History Records FoundUnknown Family Member  
  
                                Name            Dates           Details  
   
                                                    FH: CABG (coronary artery by  
pass surgery): Father(V17.3, Z82.49)  
                                                    Status:Active  
   
                                                    Family history of lung cance  
r: Mother, Sibling(V16.1, Z80.1)  
                                                    Status:Active  
   
                                                    Family history of cerebrovas  
cular accident (CVA): Father(V17.1,   
Z82.3)  
                                                    Status:Active  
  
                              Unknown Family Member  
  
                                Name            Dates           Details  
   
                                                    FH: CABG (coronary artery by  
pass surgery): Father(V17.3, Z82.49)  
                                                    Status:Active  
   
                                                    Family history of cerebrovas  
cular accident (CVA): Father(V17.1,   
Z82.3)  
                                                    Status:Active  
   
                                                    Family history of lung cance  
r: Mother, Sibling(V16.1, Z80.1)  
                                                    Status:Active  
  
                              Unknown Family Member  
  
                                Name            Dates           Details  
   
                                                    FH: CABG (coronary artery by  
pass surgery): Father(V17.3, Z82.49)  
                                                    Status:Active  
   
                                                    Family history of lung cance  
r: Mother, Sibling(V16.1, Z80.1)  
                                                    Status:Active  
   
                                                    Family history of cerebrovas  
cular accident (CVA): Father(V17.1,   
Z82.3)  
                                                    Status:Active  
  
                              Unknown Family Member  
  
                                Name            Dates           Details  
   
                                                    FH: CABG (coronary artery by  
pass surgery): Father(V17.3, Z82.49)  
                                                    Status:Active  
   
                                                    Family history of lung cance  
r: Mother, Sibling(V16.1, Z80.1)  
                                                    Status:Active  
   
                                                    Family history of cerebrovas  
cular accident (CVA): Father(V17.1,   
Z82.3)  
                                                    Status:Active  
  
                              Unknown Family Member  
  
                                Name            Dates           Details  
   
                                                    FH: CABG (coronary artery by  
pass surgery): Father(V17.3, Z82.49)  
                                                    Status:Active  
   
                                                    Family history of lung cance  
r: Mother, Sibling(V16.1, Z80.1)  
                                                    Status:Active  
   
                                                    Family history of cerebrovas  
cular accident (CVA): Father(V17.1,   
Z82.3)  
                                                    Status:Active  
  
                              Unknown Family Member  
  
                                Name            Dates           Details  
   
                                                    FH: CABG (coronary artery by  
pass surgery): Father(V17.3, Z82.49)  
                                                    Status:Active  
   
                                                    Family history of lung cance  
r: Mother, Sibling(V16.1, Z80.1)  
                                                    Status:Active  
   
                                                    Family history of cerebrovas  
cular accident (CVA): Father(V17.1,   
Z82.3)  
                                                    Status:Active  
  
                              Unknown Family Member  
  
                                Name            Dates           Details  
   
                                                    Family history of cerebrovas  
cular accident (CVA): Father(V17.1,   
Z82.3)  
                                                    Status:Active  
   
                                                    Family history of lung cance  
r: Mother, Sibling(V16.1, Z80.1)  
                                                    Status:Active  
   
                                                    FH: CABG (coronary artery by  
pass surgery): Father(V17.3, Z82.49)  
                                                    Status:Active  
  
                              Unknown Family Member  
  
                                Name            Dates           Details  
   
                                                    FH: CABG (coronary artery by  
pass surgery): Father(V17.3, Z82.49)  
                                                    Status:Active  
   
                                                    Family history of lung cance  
r: Mother, Sibling(V16.1, Z80.1)  
                                                    Status:Active  
   
                                                    Family history of cerebrovas  
cular accident (CVA): Father(V17.1,   
Z82.3)  
                                                    Status:Active  
  
                              Unknown Family Member  
  
                                Name            Dates           Details  
   
                                                    FH: CABG (coronary artery by  
pass surgery): Father(V17.3, Z82.49)  
                                                    Status:Active  
   
                                                    Family history of lung cance  
r: Mother, Sibling(V16.1, Z80.1)  
                                                    Status:Active  
   
                                                    Family history of cerebrovas  
cular accident (CVA): Father(V17.1,   
Z82.3)  
                                                    Status:Active  
  
                              Unknown Family Member  
  
                                Name            Dates           Details  
   
                                                    FH: CABG (coronary artery by  
pass surgery): Father(V17.3, Z82.49)  
                                                    Status:Active  
   
                                                    Family history of lung cance  
r: Mother, Sibling(V16.1, Z80.1)  
                                                    Status:Active  
   
                                                    Family history of cerebrovas  
cular accident (CVA): Father(V17.1,   
Z82.3)  
                                                    Status:Active  
  
                              Unknown Family Member  
  
                                Name            Dates           Details  
   
                                                    FH: CABG (coronary artery by  
pass surgery): Father(V17.3, Z82.49)  
                                                    Status:Active  
   
                                                    Family history of lung cance  
r: Mother, Sibling(V16.1, Z80.1)  
                                                    Status:Active  
   
                                                    Family history of cerebrovas  
cular accident (CVA): Father(V17.1,   
Z82.3)  
                                                    Status:Active  
  
                              Unknown Family Member  
  
                                Name            Dates           Details  
   
                                                    FH: CABG (coronary artery by  
pass surgery): Father(V17.3, Z82.49)  
                                                    Status:Active  
   
                                                    Family history of lung cance  
r: Mother, Sibling(V16.1, Z80.1)  
                                                    Status:Active  
   
                                                    Family history of cerebrovas  
cular accident (CVA): Father(V17.1,   
Z82.3)  
                                                    Status:Active  
  
                              Unknown Family Member  
  
                                Name            Dates           Details  
   
                                                    FH: CABG (coronary artery by  
pass surgery): Father(V17.3, Z82.49)  
                                                    Status:Active  
   
                                                    Family history of lung cance  
r: Mother, Sibling(V16.1, Z80.1)  
                                                    Status:Active  
   
                                                    Family history of cerebrovas  
cular accident (CVA): Father(V17.1,   
Z82.3)  
                                                    Status:Active  
  
                              Unknown Family Member  
  
                                Name            Dates           Details  
   
                                                    FH: CABG (coronary artery by  
pass surgery): Father(V17.3, Z82.49)  
                                                    Status:Active  
   
                                                    Family history of lung cance  
r: Mother, Sibling(V16.1, Z80.1)  
                                                    Status:Active  
   
                                                    Family history of cerebrovas  
cular accident (CVA): Father(V17.1,   
Z82.3)  
                                                    Status:Active  
  
                              Unknown Family Member  
  
                                Name            Dates           Details  
   
                                                    FH: CABG (coronary artery by  
pass surgery): Father(V17.3, Z82.49)  
                                                    Status:Active  
   
                                                    Family history of lung cance  
r: Mother, Sibling(V16.1, Z80.1)  
                                                    Status:Active  
   
                                                    Family history of cerebrovas  
cular accident (CVA): Father(V17.1,   
Z82.3)  
                                                    Status:Active  
  
                              Unknown Family Member  
  
                                Name            Dates           Details  
   
                                                    FH: CABG (coronary artery by  
pass surgery): Father(V17.3, Z82.49)  
                                                    Status:Active  
   
                                                    Family history of lung cance  
r: Mother, Sibling(V16.1, Z80.1)  
                                                    Status:Active  
   
                                                    Family history of cerebrovas  
cular accident (CVA): Father(V17.1,   
Z82.3)  
                                                    Status:Active  
  
                              Unknown Family Member  
  
                                Name            Dates           Details  
   
                                                    FH: CABG (coronary artery by  
pass surgery): Father(V17.3, Z82.49)  
                                                    Status:Active  
   
                                                    Family history of lung cance  
r: Mother, Sibling(V16.1, Z80.1)  
                                                    Status:Active  
   
                                                    Family history of cerebrovas  
cular accident (CVA): Father(V17.1,   
Z82.3)  
                                                    Status:Active  
  
                              Unknown Family Member  
  
                                Name            Dates           Details  
   
                                                    FH: CABG (coronary artery by  
pass surgery): Father(V17.3, Z82.49)  
                                                    Status:Active  
   
                                                    Family history of lung cance  
r: Mother, Sibling(V16.1, Z80.1)  
                                                    Status:Active  
   
                                                    Family history of cerebrovas  
cular accident (CVA): Father(V17.1,   
Z82.3)  
                                                    Status:Active  
  
  
  
Summary Purpose  
  
  
                                                      
  
  
  
Chief Complaint  
ENMANUEL ARRINGTON is being seen for a 3 week follow-up of atrial fibrillation, atrial 
flutter and s/p PVI with adjuvant lesion RFA with Dr. Hernandes 2022.ENMANUEL ARRINGTON is being seen for a 3 month follow-up of atrial fibrillation.* ENMANUEL ARRINGTON 
  is being seen for s/p ablation.  
* 77-year-old gentleman returns for follow-up following recent A-fib ablation 
  with Dr. Hernandes, he is doing well from a cardiovascular standpoint with no 
  shortness of breath, palpitations, recurrent heart failure. He remains on 
  appropriate GDMT. He has a nonischemic cardiomyopathy by echo last year with 
  ejection fraction of 40 to 45%. Stress perfusion exam last year was normal.. 
  He was subsequently referred for ablation which has proceeded.  
* He has just returned from [place], he has put on a little weight after feeling
   better and restoration of normal sinus rhythm.  
* NYHA classification is class II/C  
* Recommendations, continue current therapies, continue Eliquis for the time 
  being, obtain echocardiogram for LV functional assessment and follow-up with 
  nurse practitioner within the next 6 months  
  
  
Additional Source Comments  
  
  
  
                                                    Care Teams (unrecognized sec  
tion and content)  
   
                                          
  
  
  
                                                    Team Status: Inactive   
   
                          Member       Role         Status       Dates  
   
                          Leatha Jansen MD Primary Care Provider, Attending Pr  
ovider Active         
   
                          W Ham Murphy DO Referring Provider Active         
  
  
  
                                                    Team Status: Active   
   
                          Member       Role         Status       Dates  
   
                          Leatha Jansen MD Primary Care Provider Active         
  
  
  
                                                    Team Status: Inactive   
   
                          Member       Role         Status       Dates  
   
                          Leatha Jansen MD Primary Care Provider Active         
   
                          LAURA Murphy DO Attending Provider Active         
  
  
  
                                                    Team Status: Inactive   
   
                          Member       Role         Status       Dates  
   
                          Leatha Jansen MD Primary Care Provider Active         
   
                          W Ham Murphy DO Attending Provider Active         
   
                          George Leonard MD Referring Provider Active         
  
  
  
                      Team Member Relationship Specialty  Start Date End Date  
   
                                                      
  
  
Leatha Jansen MD  
  
  
NPI: [id number]  
  
  
[address] [place] [address]  
  
  
[phone] (Work)  
  
  
[phone] (Fax) PCP - General                   00            
  
  
  
  
  
                                                    Goals (unrecognized section   
and content)  
   
                                                    Goals may be documented in a  
n alternate sectionGoals may be documented in an   
alternate sectionGoals may be documented in an alternate section  
  
  
  
  
                                                    PREGNANCY (unrecognized sect  
ion and content)  
   
                                                    No Pregnancy Status Records   
FoundNo Pregnancy Status Records FoundNo Pregnancy   
Status   
Records FoundNo Pregnancy Status Records FoundNo Pregnancy Status Records Found  
  
  
  
  
                                                    INFORMATION SOURCE (unrecogn  
ized section and content)  
   
                                          
  
  
  
                                        DATE CREATED        AUTHOR  
   
                                2022                      [facility]  
  
  
  
                                DATE CREATED    AUTHOR          AUTHOR'S ORGANIZ  
ATION  
   
                                2023                      [facility]  
  
  
  
                                DATE CREATED    AUTHOR          AUTHOR'S ORGANIZ  
ATION  
   
                                2023                      [facility]  
  
  
  
                                DATE CREATED    AUTHOR          AUTHOR'S ORGANIZ  
ATION  
   
                                2023                       [employer]  
  
  
  
                                DATE CREATED    AUTHOR          AUTHOR'S ORGANIZ  
ATION  
   
                                2023                      [facility]  
  
  
  
  
  
                                                    Reason for Visit (unrecogniz  
ed section and content)  
   
                                          
  
  
  
                                        Reason              Comments  
   
                                        Follow-up           6m  
  
  
FOR RECORDS PERTAINING TO PATIENTS WHO ARE OR HAVE BEEN ENROLLED IN A CHEMICAL 
DEPENDENCY/SUBSTANCEABUSE PROGRAM, SOME INFORMATION MAY BE OMITTED. This 
clinical summary was aggregated from multiple sources. Caution should be 
exercised in using it in the provision of clinical care. This summary normalizes
 information from multiple sources, and as a consequence, information in this 
document may materially change the coding, format and clinical context of 
patient data. In addition, data may be omitted in some cases. CLINICAL DECISIONS
 SHOULD BE BASED ON THE PRIMARY CLINICAL RECORDS. [employer] provides
 no warranty or guarantee of the accuracy or completeness of information in this
 document.

## 2024-02-01 ENCOUNTER — HOSPITAL ENCOUNTER (OUTPATIENT)
Dept: HOSPITAL 101 - LAB | Age: 79
LOS: 28 days | Discharge: HOME | End: 2024-02-29
Payer: MEDICARE

## 2024-02-01 DIAGNOSIS — F11.10: Primary | ICD-10-CM

## 2024-02-01 LAB
CANNABINOID SCREEN URINE: NEGATIVE
METHAMPHETAMINES SCREEN URINE: NEGATIVE
TRICYCLIC ANTIDEPRESSANT URINE: NEGATIVE

## 2024-02-01 PROCEDURE — 80359 METHYLENEDIOXYAMPHETAMINES: CPT

## 2024-02-01 PROCEDURE — 80357 KETAMINE AND NORKETAMINE: CPT

## 2024-02-01 PROCEDURE — 80364 OPIOID &OPIATE ANALOG 5/MORE: CPT

## 2024-02-01 PROCEDURE — 80355 GABAPENTIN NON-BLOOD: CPT

## 2024-02-01 PROCEDURE — 80307 DRUG TEST PRSMV CHEM ANLYZR: CPT

## 2024-02-01 PROCEDURE — 83992 ASSAY FOR PHENCYCLIDINE: CPT

## 2024-02-01 PROCEDURE — 80331 ANALGESICS NON-OPIOID 6/MORE: CPT

## 2024-02-01 PROCEDURE — 80370 SKEL MUSC RELAXANT 3 OR MORE: CPT

## 2024-02-01 PROCEDURE — 80348 DRUG SCREENING BUPRENORPHINE: CPT

## 2024-02-01 PROCEDURE — 80354 DRUG SCREENING FENTANYL: CPT

## 2024-02-01 PROCEDURE — 80377 DRUG/SUBSTANCE NOS 7/MORE: CPT

## 2024-02-01 PROCEDURE — 80337 TRICYCLIC & CYCLICALS 6/MORE: CPT

## 2024-02-01 PROCEDURE — 80346 BENZODIAZEPINES1-12: CPT

## 2024-02-01 PROCEDURE — 80365 DRUG SCREENING OXYCODONE: CPT

## 2024-02-01 PROCEDURE — 82570 ASSAY OF URINE CREATININE: CPT

## 2024-02-01 PROCEDURE — 80373 DRUG SCREENING TRAMADOL: CPT

## 2024-02-01 PROCEDURE — 80366 DRUG SCREENING PREGABALIN: CPT

## 2024-02-01 PROCEDURE — 80334 ANTIDEPRESSANTS CLASS 6/MORE: CPT

## 2024-02-01 PROCEDURE — 80367 DRUG SCREENING PROPOXYPHENE: CPT

## 2024-02-01 PROCEDURE — 80341 ANTIEPILEPTICS NOS 7/MORE: CPT

## 2024-02-01 PROCEDURE — 80360 METHYLPHENIDATE: CPT

## 2024-02-01 PROCEDURE — 80372 DRUG SCREENING TAPENTADOL: CPT

## 2024-02-01 PROCEDURE — 80338 ANTIDEPRESSANT NOT SPECIFIED: CPT

## 2024-02-01 PROCEDURE — 80361 OPIATES 1 OR MORE: CPT

## 2024-02-01 PROCEDURE — 80353 DRUG SCREENING COCAINE: CPT

## 2024-02-01 PROCEDURE — 80368 SEDATIVE HYPNOTICS: CPT

## 2024-02-01 PROCEDURE — 80344 ANTIPSYCHOTICS NOS 7/MORE: CPT

## 2024-02-01 PROCEDURE — 80326 AMPHETAMINES 5 OR MORE: CPT

## 2024-02-01 PROCEDURE — 80371 STIMULANTS SYNTHETIC: CPT

## 2024-02-01 PROCEDURE — 80358 DRUG SCREENING METHADONE: CPT

## 2024-02-01 NOTE — XMS_ITS
Comprehensive CCD (C-CDA v2.1)  
  
                          Created on: 2024  
  
  
ENMANUEL ARRINGTON  
External Reference #: [id number]  
: 1945  
Sex: Male  
  
Demographics  
  
  
                                        Address             [address] [place]  [address]  
   
                                        Home Phone          [phone]  
   
                                        Home Phone          [phone]  
   
                                        Preferred Language  en  
   
                                        Marital Status        
   
                                        Episcopalian Affiliation Unknown  
   
                                        Race                White  
   
                                        Ethnic Group        Not  or Lati  
no  
  
  
Author  
  
  
                                        Name                Unknown  
   
                                        Address             [address]  
[address]  
[place]  [address]  
   
                                        Phone               [phone]  
   
                                        Organization        [facility]  
  
  
Care Team Providers  
  
  
                                Care Team Member Name Role            Phone  
   
                                MD Leatha Jansen Primary Care Provider [phone]  
[phone]  
   
                                MD Leatha Jansen Attending Provider [phone]  
[phone]  
   
                                DO LAURA Murphy Referring Provider [phone]  
   
                                Leatha Jansen  Unavailable     [phone]  
   
                                Unavailable     Unavailable     [phone]  
   
                                DO LAURA Murphy Attending Provider [phone]  
   
                                MD George Leonard Referring Provider [phone]  
   
                                DR LEATHA JANSEN Primary Care    Unavailable  
   
                                DR LEATHA JANSEN Attending       Unavailable  
   
                                SOM, DR ZHANG Admitting       Unavailable  
   
                                SOM, DR ZHANG Primary Care    Unavailable  
   
                                SOM, DR ZHANG Attending       Unavailable  
   
                                SOM, DR ZHANG Admitting       Unavailable  
   
                                SOM, DR ZHANG Attending       Unavailable  
   
                                SOM, DR ZHANG Admitting       Unavailable  
   
                                DR LEATHA JANSEN Primary Care    Unavailable  
   
                                DR FRANCIA MONTES Consulting      Unavailable  
   
                                DR LEATHA JANSEN Consulting      Unavailable  
   
                                DR ALYCE CAZARES Consulting      Unavailable  
   
                                CORKY GONZALEZ Consulting      Unavailable  
   
                                SURENDRA MANCERA Consulting      Unavailable  
   
                                SOCRATES RICHARDS       Consulting      Unavailable  
   
                                DR LEATHA JANSEN Primary Care    Unavailable  
   
                                FAWWAD, BLAIR H Admitting       Unavailable  
   
                                FAWWAD, BLAIR H Attending       Unavailable  
   
                                FAWWAD, BLAIR H Attending       Unavailable  
   
                                FAWWAD, BLAIR H Admitting       Unavailable  
   
                                DR LEATHA JANSEN Primary Care    Unavailable  
   
                                FAWWAD, BLAIR H Attending       Unavailable  
   
                                FAWWAD, BLAIR H Admitting       Unavailable  
   
                                DR LEATHA JANSEN Primary Care    Unavailable  
   
                                FAWWAD, BLAIR H Attending       Unavailable  
   
                                FAWWAD, BLAIR H Admitting       Unavailable  
   
                                DR LEATHA JANSEN Primary Care    Unavailable  
   
                                FAWWAD, BLAIR H Attending       Unavailable  
   
                                FAWWAD, BLAIR H Admitting       Unavailable  
   
                                HOY, DR ZHANG Primary Care    Unavailable  
   
                                FAWWAD, BLAIR H Attending       Unavailable  
   
                                FAWWAD, BLAIR H Admitting       Unavailable  
   
                                HOY, DR ZHANG Primary Care    Unavailable  
   
                                HOY, DR ZHANG Consulting      Unavailable  
   
                                HOY, DR ZHANG Attending       Unavailable  
   
                                HOY, DR ZHANG Admitting       Unavailable  
   
                                HOY, DR ZHANG Primary Care    Unavailable  
   
                                HOY, DR ZHANG Consulting      Unavailable  
   
                                HOY, DR ZHANG Attending       Unavailable  
   
                                HOY, DR ZHANG Admitting       Unavailable  
   
                                HOY, DR ZHANG Primary Care    Unavailable  
   
                                Zieber, DR Fleming Consulting      Unavailable  
   
                                HOY, DR ZHANG Consulting      Unavailable  
   
                                HOY, DR ZHANG Attending       Unavailable  
   
                                HOY, DR ZHANG Admitting       Unavailable  
   
                                HOY, DR ZHANG Primary Care    Unavailable  
   
                                HOY, DR ZHANG Attending       Unavailable  
   
                                HOY, DR ZHANG Admitting       Unavailable  
   
                                HOY, DR ZHANG Primary Care    Unavailable  
   
                                HOY, DR ZHANG Consulting      Unavailable  
   
                                LAURA Murphy Admitting       Unavailable  
   
                                JeffreyLAURA millan Referring       Unavailable  
   
                                JeffreyLAURA Attending       Unavailable  
   
                                Hoy, Leatha M  Primary Care    Unavailable  
   
                                JeffreyLAURA Ham Referring       Unavailable  
   
                                Hoy, Leatha M  Admitting       Unavailable  
   
                                Hoy, Leatha M  Attending       Unavailable  
   
                                Hoy, Leatha M  Primary Care    Unavailable  
   
                                JeffreyLAURA millan Attending       Unavailable  
   
                                Hoy, Leatha M  Primary Care    Unavailable  
   
                                Jeffrey, W Ham Admitting       Unavailable  
   
                                JeffreyLAURA Ham Attending       Unavailable  
   
                                Leonard, Vazquez Referring       Unavailable  
   
                                Hoy, Leatha M  Primary Care    Unavailable  
   
                                JeffreyLAURA millan Admitting       Unavailable  
   
                                Honeisha, Dr. Leatha Hawkins Primary Care    Unavail  
able  
   
                                Agustín Murphy Attending       Unavailable  
   
                                Arias, Dr. Donna Hills Attending       Un  
available  
   
                                UNKNOWN, PCP    Referring       Unavailable  
   
                                Hoy, Dr. Leatha Hawkins Primary Care    Unavail  
able  
   
                                Arias, Dr. Donna Hills Attending       Un  
available  
   
                                Arias, Dr. Donna Hills Referring       Un  
available  
   
                                Arias, Dr. Donna Hills Admitting       Un  
available  
   
                                Hoy, Dr. Leatha Hawkins Primary Care    Unavail  
able  
   
                                Hoy, Dr. Leatha Hawkins Primary Care    Unavail  
able  
   
                                Arias, Dr. Donna Hills Attending       Un  
available  
   
                                Agustín Murphy Referring       Unavailable  
   
                                MedSivan holm  Attending       Unavailable  
   
                                Sivan Schuler  Referring       Unavailable  
   
                                Hoy, Dr. Leatha Hawkins Primary Care    Unavail  
able  
   
                                Hoy, Dr. Leatha Hawkins Primary Care    Unavail  
able  
   
                                Meder, Sivan  Attending       Unavailable  
   
                                Dr. Donna Hernandes Attending       Un  
available  
   
                                Dr. Leatha Jansen Primary Care    Unavail  
montez Jansen, Dr. Leatha Hawkins Primary Care    Unavail  
able  
   
                                Agustín Murphy Attending       Unavailable  
   
                                Agustín Murphy Referring       Unavailable  
   
                                Dr. Leatha Jansen Primary Care    Unavail  
montez Jansen, Dr. Leatha Hawkins Referring       Unavail  
Agustín Ramon Attending       Unavailable  
   
                                Leatha Jansen MD Primary Care Provider [phone]  
   
                                SARATH MONTES  Attending       Unavailable  
   
                                LEATHA JANSEN Primary Care    Unavailable  
  
  
  
Medications  
Current Medications  
  
  
  
                      Medication Drug Class(es) Dates      Sig (Normalized) Sig   
(Original)  
   
                                                    apixaban 5 mg oral   
tablet  
(20 sources)                            Factor Xa   
Inhibitor                               Start:   
2022  
End:   
2023                              take 1 tablet by   
mouth twice daily                       Apixaban   
(Eliquis) 5 mg   
Tablet Active 5   
MG PO Twice daily   
2022   
3:25pm  
   
                                                    carvedilol 6.25 mg   
oral tablet  
(20 sources)                            alpha-Adrenergic   
Blocker,   
beta-Adrenergic   
Blocker                                 Start:   
2022                              take 0.5 tablet by   
mouth twice daily                       carvedilol   
(Coreg) 6.25 mg   
tablet Take 0.5   
tablets (3.125   
mg) by mouth   
twice a day. 0   
2022 Active  
  
  
  
                                        Start: 2022   take 1 tablet by liseth  
th twice   
daily at dinner                         Carvedilol 3.125 MG Oral Tablet   
take 1 tablet by mouth twice a day   
with MORNING AND EVENING MEAL   
Quantity: 3 Refills: 3 Ordered:   
10-Claudy-2023 Sivan Torres   
Start : 5-Aug-2022 Active  
   
                                        Start: 2022   take 1 tablet by liseth  
th twice   
daily at mealtime                       Carvedilol 6.25 MG Oral Tablet TAKE   
1 TABLET TWICE DAILY WITH MEALS.   
Quantity: 180 Refills: 3 Ordered:   
26-May-2022 Agustín Murphy DO   
Start : 26-May-2022 Active  
  
  
  
                                                    chlorhexidine gluconate   
1.2 mg/ml mouthwash  
(7 sources)                                         Start: 2022  
End: 2022                         take 15 mL by   
mouth in the   
morning                                 Chlorhexidine Gluconate   
0.12 % Mouth/Throat   
Solution RINSE MOUTH   
WITH 15ML (1 CAPFUL) FOR   
30 SECONDS AM AND PM   
AFTER TOOTHBRUSHING.   
EXPECTORATE AFTER   
RINSING, DO NOT SWALLOW   
Quantity: 1 Refills: 0   
Ordered: 2022   
Choco Perry Start :   
2022 End :   
2022 Active  
  
  
  
                                                    Start: 2022  
End: 2023                                     chlorhexidine (Hibiclens) 4   
% external liquid Apply topically.   
0   
2022 Discontinued (Therapy completed)  
   
                                                    Start: 2022  
End: 2023                                     Hibiclens 4 % External Liqui  
d Use as directed for preoperative   
shower. Quantity: 1 Refills: 0 Ordered: 2022 Choco Perry Start : 2022 End : 27-Mar-2023 Complete  
  
  
  
                                                    losartan potassium   
25 mg oral tablet  
(20 sources)                            Angiotensin 2   
Receptor Blocker                        Start:   
2022                              take 1 tablet   
by mouth once   
daily                                   losartan (Cozaar)   
25 mg tablet Take 1   
tablet (25 mg) by   
mouth once daily. 0   
2022 Active  
   
                                                    pantoprazole 40 mg   
delayed release oral   
tablet  
(16 sources)                            Proton Pump   
Inhibitor                               Start:   
2022  
End: 2023                         take 1 tablet   
by mouth once   
daily as   
needed                                  Pantoprazole   
(Protonix) 40 mg   
Tablet,Delayed   
Release (Dr/Ec)   
Active 40 MG PO   
Daily 2022 3:31pm Pt   
states taking prn  
   
                                                    rivaroxaban 20 mg   
oral tablet  
(1 source)                              Factor Xa   
Inhibitor                                           take 1 tablet   
by mouth once   
daily at   
mealtime                                rivaroxaban   
(Xarelto) 20 mg   
tablet Take 1   
tablet (20 mg) by   
mouth once daily.   
Take with food. 0   
Active  
   
                                                    spironolactone 25 mg   
oral tablet  
(2 sources)                             Aldosterone   
Antagonist                              Start:   
2023  
End: 2023                         take 1 tablet   
by mouth once   
daily                                   spironolactone   
(Aldactone) 25 mg   
tablet Indications:   
Paroxysmal atrial   
fibrillation   
(CMS/HCC) ,   
Nonischemic   
cardiomyopathy   
(CMS/HCC) Take 1   
tablet (25 mg) by   
mouth once daily.   
90 tablet 3   
2023 Active  
  
  
  
Completed/Discontinued Medications  
  
  
  
                      Medication Drug Class(es) Dates      Sig (Normalized) Sig   
(Original)  
   
                                                    eplerenone 25 mg   
oral tablet  
(20 sources)                            Aldosterone   
Antagonist                              Start:   
2022  
End:   
2023                              take 1 tablet by   
mouth once daily                        eplerenone (Inspra)   
25 mg tablet Take 1   
tablet (25 mg) by   
mouth once daily. 0   
2022   
Discontinued   
(Ineffective)  
  
  
  
Problems  
Active Problems  
  
  
                      Problem Classification Problem    Date       Documented Da  
te Episodic/Chronic  
   
                                                    Blindness and vision   
defects  
(1 source)                              Unspecified visual   
loss; Translations:   
[Unspecified visual   
loss]                                   Onset:   
2022                                          Chronic  
   
                                                    Cardiac dysrhythmias  
(20 sources)                            Atrial fibrillation;   
Translations:   
[Atrial   
fibrillation]                           Onset:   
2022                                          Chronic  
   
                                                    Congestive heart   
failure;   
nonhypertensive  
(4 sources)                             Unspecified   
diastolic   
(congestive) heart   
failure;   
Translations:   
[Congestive heart   
failure stage C]                        Onset:   
04-                10-                Chronic  
   
                                                    Esophageal disorders  
(1 source)                              Gastro-esophageal   
reflux disease   
without esophagitis;   
Translations:   
[Gastro-esophageal   
reflux disease   
without esophagitis]                    Onset:   
2022                                          Chronic  
   
                                                    Essential hypertension  
(1 source)                              Essential (primary)   
hypertension;   
Translations:   
[Essential (primary)   
hypertension]                           Onset:   
2022                                          Chronic  
   
                                                    Hypertension with   
complications and   
secondary hypertension  
(1 source)                              Hypertensive heart   
disease with heart   
failure;   
Translations: [HTN   
HEART DISEASE   
W/HEART FAIL]                           Onset:   
2022                                          Chronic  
   
                                                    Osteoarthritis  
(1 source)                              Unspecified   
osteoarthritis,   
unspecified site;   
Translations:   
[Unspecified   
osteoarthritis,   
unspecified site]                       Onset:   
2022                                          Chronic  
   
                                                    Other aftercare  
(4 sources)                             Encounter for   
therapeutic drug   
level monitoring;   
Translations: [ENC   
THERAPEUTC DRUG LEVL   
MONITORING]                             Onset:   
2022                                          Episodic  
   
                                                    Other aftercare  
(4 sources)                             Long term (current)   
use of   
anticoagulants;   
Translations: [LONG   
TERM CURRNT USE   
ANTICOAGULANTS]                         Onset:   
2022                                          Episodic  
   
                                                    Other aftercare  
(2 sources)                             Long-term current   
use of   
anticoagulant;   
Translations: [Long   
term (current) use   
of anticoagulants]                      Onset:   
2023                Episodic  
   
                                                    Other ear and sense   
organ disorders  
(1 source)                              Unspecified hearing   
loss, unspecified   
ear; Translations:   
[Unspecified hearing   
loss, unspecified   
ear]                                    Onset:   
2022                                          Chronic  
   
                                                    Other lower   
respiratory disease  
(20 sources)                            Dyspnea on exertion;   
Translations: [Other   
respiratory   
abnormalities]                          Onset:   
10-                10-                Episodic  
   
                                                    Other nutritional;   
endocrine; and   
metabolic disorders  
(20 sources)                            Overweight in   
adulthood with body   
mass index of 25 or   
more but less than   
30; Translations:   
[Overweight]                            Onset:   
10-                11-                Episodic  
   
                                                    Other nutritional;   
endocrine; and   
metabolic disorders  
(2 sources)                             Overweight;   
Translations:   
[Overweight]                            Onset:   
10-                                          Episodic  
   
                                                    Other nutritional;   
endocrine; and   
metabolic disorders  
(2 sources)                             Body mass index   
(BMI) 27.0-27.9,   
adult; Translations:   
[Body mass index   
(BMI) 27.0-27.9,   
adult]                                  Onset:   
10-                                          Episodic  
   
                                                    Reba-; endo-; and   
myocarditis;   
cardiomyopathy (except   
that caused by   
tuberculosis or   
sexually transmitted   
disease)  
(20 sources)                            Cardiomyopathy;   
Translations: [Other   
primary   
cardiomyopathies]                       Onset:   
2022                Chronic  
   
                                                    Screening and history   
of mental health and   
substance abuse codes  
(20 sources)                            Ex-smoker;   
Translations:   
[Personal history of   
tobacco use]                            Onset:   
12-                10-                Episodic  
   
                                        Comment on above:   quit somking at age   
34;   
   
                                                    Substance-related   
disorders  
(2 sources)                             Opioid abuse,   
uncomplicated;   
Translations:   
[Cocaine abuse, in   
remission]                              Onset:   
2022                                          Chronic  
   
                                                    Unclassified  
(1 source)                              CONTACT W/AND (SUSP)   
EXPOS COVID-19;   
Translations:   
[CONTACT W/AND   
(SUSP) EXPOS   
COVID-19]                               Onset:   
2022                                            
   
                                                    Unclassified  
(3 sources)                             COUGH, UNSPECIFIED;   
Translations:   
[COUGH, UNSPECIFIED]                    Onset:   
2022                                            
   
                                                    Unclassified  
(1 source)                              I48.91 - Unspecified   
atrial fibrillation;   
Translations:   
[I48.91 -   
Unspecified atrial   
fibrillation]                           Onset:   
2022                                            
   
                                                    Unclassified  
(1 source)                              Z01.812 - Encounter   
for preprocedural   
laboratory   
examination;   
Translations:   
[Z01.812 - Encounter   
for preprocedural   
laboratory   
examination]                            Onset:   
2022                                            
   
                                                    Unclassified  
(1 source)                              R06.00 - Dyspnea,   
unspecified;   
Translations:   
[R06.00 - Dyspnea,   
unspecified]                            Onset:   
2022                                            
   
                                                    Unclassified  
(1 source)                              Personal history of   
COVID-19;   
Translations:   
[Personal history of   
COVID-19]                               Onset:   
2022                                            
   
                                                    Viral infection  
(4 sources)                             COVID-19;   
Translations:   
[COVID-19]                              Onset:   
06-                                            
  
  
Past or Other Problems  
  
  
                      Problem Classification Problem    Date       Documented Da  
te Episodic/Chronic  
   
                                                    Diabetes mellitus   
without complication  
(1 source)                              Other abnormal   
glucose;   
Translations:   
[OTHER ABNORMAL   
GLUCOSE]                                Onset:   
2022                                          Episodic  
   
                                                    Immunizations and   
screening for   
infectious disease  
(1 source)                              Encounter for   
immunization;   
Translations:   
[ENCOUNTER FOR   
IMMUNIZATION]                           Onset:   
2022                                          Episodic  
   
                                                    Other aftercare  
(2 sources)                             Other long term   
(current) drug   
therapy;   
Translations: [OTH   
LONG TERM CURRENT   
DRUG THERAPY]                           Onset:   
2022                                          Episodic  
   
                                                    Other connective   
tissue disease  
(1 source)                              Unspecified rotator   
cuff tear or   
rupture of   
unspecified   
shoulder, not   
specified as   
traumatic;   
Translations: [UNS   
ROT CUFF TEAR/RUPT   
UNS SHOULDER]                           Onset:   
2022                                          Episodic  
   
                                                    Other non-traumatic   
joint disorders  
(4 sources)                             Pain in unspecified   
shoulder;   
Translations: [PAIN   
IN UNSPECIFIED   
SHOULDER]                               Onset:   
2022                                          Episodic  
   
                                                    Residual codes;   
unclassified  
(4 sources)                             Altered mental   
status,   
unspecified;   
Translations:   
[ALTERED MENTAL   
STATUS UNSPECIFIED]                     Onset:   
2022                                          Episodic  
   
                                                    Residual codes;   
unclassified  
(1 source)                              Procedure and   
treatment not   
carried out for   
other reasons;   
Translations:   
[Procedure and   
treatment not   
carried out for   
other reasons]                          Onset:   
2022                                          Episodic  
   
                                                    Unclassified  
(1 source)                              COUGH, UNSPECIFIED;   
Translations:   
[COUGH,   
UNSPECIFIED]                            Onset:   
2022                                            
   
                                                    Unclassified  
(1 source)                                          Onset:   
2023                  
  
  
  
Results  
  
  
                          Test Name    Value        Interpretation Reference   
Range                                   Facility  
   
                                                    Office Visit (Cardiology)on   
2023   
   
                                        Follow-up visit     Diagnoses/Problems  
Assessed  
Nonischemic   
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   
(I42.8)  
Atrial fibrillation   
(427.31) (I48.91)  
Overweight with body   
mass index (BMI) of 27   
to 27.9 in adult   
(278.02,V85.23)  
(E66.3,Z68.27)  
Former smoker (V15.82)   
(Z87.891)  
quit somking at age 34  
CHF (NYHA class II,   
ACC/AHA stage C) (428.0)   
(I50.9)  
Orders  
Atrial fibrillation,   
Atypical atrial flutter,   
CHF (NYHA class II,   
ACC/AHA stage C),  
Nonischemic   
cardiomyopathy  
Echocardiogram;   
Status:Hold For -   
Scheduling,Retrospective   
Authorization; Requested  
for:2023;  
CHF (NYHA class II,   
ACC/AHA stage C)  
Basic Metabolic Panel;   
Status:Active -   
Retrospective   
Authorization; Requested  
for:2023;  
Brain Natriuretic   
Peptide BNP;   
Status:Active -   
Retrospective   
Authorization; Requested  
for:2023;  
Overweight with body   
mass index (BMI) of 27   
to 27.9 in adult  
Healthy Weight Tips;   
Status:Complete -   
Retrospective   
Authorization; Done:   
2023  
Some eating tips that   
can help you lose   
weight.; Status:Complete   
- Retrospective  
Authorization; Done:   
2023  
SocHx: Former smoker  
Tobacco Use Screening;   
Status:Complete; Done:   
2023  
Patient Instructions  
Please bring all   
medicines, vitamins, and   
herbal supplements with   
you when you come to the   
office.  
Prescriptions will not   
be filled unless you are   
compliant with your   
follow up appointments   
or have a follow up   
appointment scheduled as   
per instruction of your   
physician. Refills   
should be requested at   
the time of your visit.  
Follow up in 6 months  
  
Chief Complaint  
ENMANUEL ARRINGTON is being seen   
for s/p ablation.  
77-year-old gentleman   
returns for follow-up   
following recent A-fib   
ablation with Dr. Hernandes, he is doing well   
from a cardiovascular   
standpoint with no   
shortness of breath,   
palpitations, recurrent   
heart failure. He   
remains on appropriate   
GDMT. He has a   
nonischemic   
cardiomyopathy by echo   
last year with ejection   
fraction of 40 to 45%.   
Stress perfusion exam   
last year was normal..   
He was subsequently   
referred for ablation   
which has proceeded.  
He has just returned   
from [place], he has put   
on a little weight after   
feeling better and   
restoration of normal   
sinus rhythm.  
NYHA classification is   
class II/C  
Recommendations,   
continue current   
therapies, continue   
Eliquis for the time   
being, obtain   
echocardiogram for LV   
functional assessment   
and follow-up with nurse   
practitioner within the   
next 6 months  
  
Surgical History   
Problems  
History of Catheter   
ablation  
History of Middle ear   
surgery  
History of Oral surgery  
History of Tonsillectomy  
Current Meds  
  
Medication   
NameInstruction  
Carvedilol 3.125 MG Oral   
Tablettake 1 tablet by   
mouth twice a day with   
MORNING AND EVENING MEAL  
Eliquis 5 MG Oral   
TabletTake 1 tablet   
twice daily  
Eplerenone 25 MG Oral   
TabletTake 1 tablet   
daily  
Losartan Potassium 25 MG   
Oral TabletTake 1 tablet   
daily  
Patient did not bring   
medication list or   
bottles. Updated   
verbally with patient  
Allergies  
Medication  
No Known Drug Allergies  
Recorded By: Almita Alford; 2022   
10:01:43 AM  
Social History  
Problems  
Consumes alcohol   
occasionally (V49.89)   
(Z78.9)  
Daily caffeine   
consumption  
2 cups of coffee daily,   
pop three times weekly  
Former smoker (V15.82)   
(Z87.891)  
quit somking at age 34  
Illicit drug use   
(305.90) (F19.90)  
oxycodone  
Review of Systems  
Constitutional: not   
feeling tired.  
Cardiovascular: no   
intermittent leg   
claudication and as   
noted in HPI.  
Respiratory: shortness   
of breath during   
exertion, but no cough   
and no shortness of   
breath.  
Gastrointestinal: no   
change in bowel habits   
and no blood in stools.  
Integumentary: no skin   
rashes.  
Neurological: no   
seizures and no frequent   
falls.  
All other systems have   
been reviewed and are   
negative for complaint.  
  
Vitals  
Vital Signs  
Recorded: 2023   
11:39AM  
Heart Rate62, R Radial  
Gzzyxmpv560, LUE,   
Sitting  
Mrgsxwrly76, LUE,   
Sitting  
Height5 ft 7 in  
Mkxwim396 lb  
BMI Plyigtyyhy51.25   
kg/m2  
BSA Calculated1.91  
Tobacco Useb) No  
Falls Screening (Age   
18+)a) No falls within   
the last year  
Physical Exam  
Constitutional: alert   
and in no acute   
distress.  
Neck: neck is supple,   
symmetric, trachea   
midline, no masses and   
no thyromegaly .  
Pulmonary: no increased   
work of breathing or   
signs of respiratory   
distress and lungs clear   
to auscultation.  
Cardiovascular: carotid   
pulses 2+ bilaterally   
with no bruit , JVP was   
normal, no thrills ,   
regular rhythm, normal   
S1 and S2, no murmurs ,   
pedal pulses 2+   
bilaterally and no edema   
.  
Abdomen: abdomen   
non-tender, no masses   
and no hepatomegaly .  
Skin: skin warm and dry,   
normal skin turgor .  
Psychiatric judgment and   
insight is normal and   
oriented to person,   
place and time .  
  
Signatures  
Electronically signed by   
: Agustín Murphy DO;   
May 4 2023 1:02PM EST   
(Author)            Normal                                   [employer]  
   
                                                    Tobacco Screening.on   
023   
   
                                        Fall risk assessment a) No falls within   
the   
last year                                                   [facility]   
[employer]   
250 DO  
Work Phone:   
[phone]  
   
                                                    Tobacco use status   
[facility]            b) No                                           [facility]   
[facility]   
250 DO  
Work Phone:   
[phone]  
   
                                                    Electrocardiogram 12 Leadon   
2023   
   
                                                    Electrocardiogram 12   
Lead                                    Ventricular Rate  
82  
Atrial Rate  
82  
P-R Interval  
218  
QRS Duration  
86  
Q-T Interval  
358  
QTC Calculation(Bazett)  
418  
P Axis  
68  
R Axis  
43  
T Axis  
56  
QRS Count  
13  
Q Onset  
227  
P Onset  
118  
P Offset  
155  
T Offset  
406  
QTC Fredericia  
397  
Diagnosis Class  
Abnormal  
Diagnosis  
Sinus rhythm with 1st   
degree A-V block  
Cannot rule out Anterior   
infarct , age   
undetermined  
Abnormal ECG  
When compared with ECG   
of 2023 15:28,  
Sinus rhythm has   
replaced Atrial flutter  
Confirmed by Sonny Hernandez ([id number]) on   
2023 7:56:03 AM Normal                                  [facility]  
   
                                                    No Panel Informationon    
   
                                                            [email or web address]  
[email or web address]  
[email or web address]  
.[email or web address]?[email or web address]  
laine?[email or web address]&  
Date=2023&Time=13%  
3a26%3a59%3a00&TestType=  
ECG&Site=1&OutputType=PD  
F&Ext=PDF                                                   [facility]   
[facility]   
250 DO  
Work Phone:   
[phone]  
   
                                                            Sinus rhythm with 1s  
t   
degree A-V block                                            [facility]   
[facility]   
250 DO  
Work Phone:   
[phone]  
   
                                 Abnormal                         [facility]   
[facility]   
250 DO  
Work Phone:   
[phone]  
   
                                 397 1                            [facility]   
[facility]   
250 DO  
Work Phone:   
[phone]  
   
                                 406 1                            [facility]   
[facility]   
250 DO  
Work Phone:   
[phone]  
   
                                 155 1                            [facility]   
[facility]   
250 DO  
Work Phone:   
[phone]  
   
                                 118 1                            [facility]   
[facility]   
250 DO  
Work Phone:   
[phone]  
   
                                 227 1                            [facility]   
[facility]   
250 DO  
Work Phone:   
[phone]  
   
                                 13 1                             [facility]   
[facility]   
250 DO  
Work Phone:   
[phone]  
   
                                 56 1                             [facility]   
[facility]   
250 DO  
Work Phone:   
[phone]  
   
                                 43 1                             [facility]   
[facility]   
250 DO  
Work Phone:   
[phone]  
   
                                 68 1                             [facility]   
[facility]   
250 DO  
Work Phone:   
[phone]  
   
                                 418 1                            [facility]   
[facility]   
250 DO  
Work Phone:   
[phone]  
   
                                 358 1                            [facility]   
[facility]   
250 DO  
Work Phone:   
[phone]  
   
                                 86 1                             [facility]   
[facility]   
250 DO  
Work Phone:   
[phone]  
   
                                 218 1                            [facility]   
[facility]   
250 DO  
Work Phone:   
[phone]  
   
                                 82 1                             [facility]   
[facility]   
250 DO  
Work Phone:   
[phone]  
   
                                                    Office Visit (Cardiology)on   
2023   
   
                                        Follow-up visit     Diagnoses/Problems  
Assessed  
Atrial fibrillation   
(427.31) (I48.91)  
Atypical atrial flutter   
(427.32) (I48.4)  
Orders  
Atrial fibrillation,   
Atypical atrial flutter  
Renew: Eliquis 5 MG Oral   
Tablet; Take 1 tablet   
twice daily  
Chief Complaint  
ENMANUEL ARRINGTON is being seen   
for a 3 month follow-up   
of atrial fibrillation.  
  
Adult Risk Screening  
There are no   
spiritual/cultural   
practices/values/needs   
that are important to   
know  
Initial Fall Risk   
Screening:  
ENMANUEL has not fallen in   
the last 6 months.  
Pain Scale: On a scale   
of 0 to 10, the patient   
rates the pain at 0.  
  
Living Will.  
Living Will: No living   
will on file.  
Healthcare POA: No   
healthcare proxy on   
file.  
Tobacco Screening: ENMANUEL   
does not use tobacco.  
  
History of Present   
Illness  
Enmanuel Arrington is a 76 y/o   
male initially referred   
by Dr Murphy for   
evaluation on AF.  
PMH includes NICM, R   
should rotator cuff   
injury, cocaine abuse x   
17 yrs (rehab in [place] now   
sober), oxycodone abuse   
(started in ) w/   
accidental overdose and   
AF.  
Treatment of his AF   
includes carvedilol. Pt   
has not required DCCV or   
AAD?s.  
Symptoms of his AF   
include palpitations and   
DELEON. .  
Pt established with Dr Murphy with the plan   
for DCCV. His DCCV was   
cancelled because pt had   
COVID.. Pt has a history   
of Oxycodone abuse which   
he was started on   
initially in  for a   
rotator cuff injury. Pt   
has been obtaining the   
medications on the   
street not through   
prescription. Pt had a   
recent accidental   
overdose which required   
ED visit an reversal   
with Narcan. Pt was take   
to rehab by his daughter   
about 1 week ago but   
left the same day.  
Pt reports that he has   
been sober since August  
Echo 2022: EF 40-45%,   
septal akinesis, LA   
mildly dilated  
Stress test 2022: no   
ischemia or myocardial   
infarction  
Now s/p PVI and adjuvant   
lesion RFA with Dr. Hernandes 2022  
Patient unfortunately   
caught a URI around   
Ana and also   
experienced a fever. He   
was started on   
antibiotics around   
ECG 2023 Atypical   
Aflutter HR 95 bpm.  
S/P DCCV with Dr. Murphy  
ECG 3/27/2023 NSR with   
first degree AV block,   
HR 82 bpm  
TODAY Patient presents   
for 3 month follow up   
post Afib ablation. He   
is feeling good. He   
denies any arrhythmia   
symptoms since his   
cardioversion. Per his   
wife, he still gets   
tired throughout the day   
and takes daily naps.   
But overall, he no   
longer feels   
palpitations or DELEON.  
  
Active Problems  
Problems  
Atrial fibrillation   
(427.31) (I48.91)  
Atypical atrial flutter   
(427.32) (I48.4)  
Dyspnea on exertion   
(786.09) (R06.09)  
Former smoker (V15.82)   
(Z87.891)  
quit somking at age 34  
Nonischemic   
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   
(I42.8)  
Overweight with body   
mass index (BMI) of 25   
to 25.9 in adult   
(278.02,V85.21)  
(E66.3,Z68.25)  
Surgical History  
Problems  
History of Middle ear   
surgery  
History of Oral surgery  
History of Tonsillectomy  
Current Meds  
  
Medication   
NameInstruction  
Carvedilol 3.125 MG Oral   
Tablettake 1 tablet by   
mouth twice a day with   
MORNING AND EVENING MEAL  
Eliquis 5 MG Oral   
TabletTake 1 tablet   
twice daily  
Eplerenone 25 MG Oral   
TabletTAKE 1 TABLET   
DAILY.  
Losartan Potassium 25 MG   
Oral TabletTAKE 1 TABLET   
DAILY AS DIRECTED.  
Allergies  
Medication  
No Known Drug Allergies  
Recorded By: Almita Alford; 2022   
10:01:43 AM  
Family History  
Mother  
Family history of lung   
cancer (V16.1) (Z80.1)  
Father  
Family history of   
cerebrovascular accident   
(CVA) (V17.1) (Z82.3)  
FH: CABG (coronary   
artery bypass surgery)   
(V17.3) (Z82.49)  
Sibling  
Family history of lung   
cancer (V16.1) (Z80.1)  
Social History  
Problems  
Consumes alcohol   
occasionally (V49.89)   
(Z78.9)  
Daily caffeine   
consumption  
2 cups of coffee daily,   
pop three times weekly  
Former smoker (V15.82)   
(Z87.891)  
quit somking at age 34  
Illicit drug use   
(305.90) (F19.90)  
oxycodone  
Review of Systems  
Constitutional: not   
feeling tired.  
Eyes: no eyesight   
problems.  
ENT: no hearing loss and   
no nosebleeds.  
Cardiovascular: no   
intermittent leg   
claudication and as   
noted in HPI.  
Respiratory: no chronic   
cough and no shortness   
of breath.  
Gastrointestinal: no   
change in bowel habits   
and no blood in stools.  
Genitourinary: no   
urinary frequency and no   
hematuria.  
Skin: no skin rashes.  
Neurological: no   
seizures and no frequent   
falls.  
Psychiatric: no   
depression and not   
suicidal.  
All other systems have   
been reviewed and are   
negative for complaint.  
  
Vitals  
Vital Signs  
Recorded: 2023   
01:18PM  
Heart Rate87  
Bwocusze054, LUE,   
Sitting  
Icegjeqyt62, LUE,   
Sitting  
Height5 ft 7 in  
Ytfksj722 lb 8 oz  
BMI Gaqfkajult87.49   
kg/m2  
BSA Calculated1.91  
Tobacco Useb) No  
PHQ-2 #1. Over the last   
2 weeks have you felt   
down, depressed or   
hopeless? (If yes,   
answer PHQ-9 below)No  
PHQ-2 #2. Over the last   
2 weeks have you felt   
little interest or   
pleasure in doing   
things? (If yes, answer   
PHQ-9 below)No  
Falls Screening (Age   
18+)a) No falls within   
the last year  
O2 Vguqbqpnha90, RA  
Pain Scale0  
Physical Exam  
Constitutional: alert   
and in no acute   
distress.  
Neck: neck (more content   
not included)...    Normal                                   [employer]  
   
                                                    Tobacco Screening.on   
023   
   
                                                    Adult depression   
screening assessment No                                              [facility]  
[facility]  
[facility]   
[address]   
[place]  
Work Phone:   
[phone]  
   
                                        Fall risk assessment a) No falls within   
the   
last year                                                   [facility]  
[facility] [address]   
[place]  
Work Phone:   
[phone]  
   
                                                    Tobacco use status   
CP            b) No                                           [facility]  
[facility]   
[address]   
[place]  
Work Phone:   
[phone]  
   
                                                    ECG 12 lead ECGon 2023  
   
   
                                        ECG 12 lead ECG     [facility] [address] [place] [id number]  
  
Electrocardiograph   
Report  
Signed  
  
Patient: Enmanuel Arrington   
MR#: [id number]  
  
: 1945   
Acct:[id number]  
  
Age/Sex: 77 / M ADM   
Date: 23  
  
Loc: [facility] Room: Type: [facility]   
SDC  
Attending Dr: LAURA Murphy DO  
  
Ordering Provider:   
George Leonard MD,   
[facility]  
Date of Service:   
23  
Accession #:   
([id number]) ECG/ECG 12   
lead ECG:   
Pre-cardioversion rhythm   
assessment  
  
  
Copies to:  
  
  
Test Reason :  
Blood Pressure : ***/***   
mmHG  
Vent. Rate : 104 BPM   
Atrial Rate : 258 BPM  
P-R Int : 000 ms QRS Dur   
: 074 ms  
QT Int : 328 ms P-R-T   
Axes : 000 034 025   
degrees  
QTc Int : 431 ms  
  
Atrial flutter with   
variable AV block  
Abnormal ECG  
When compared with ECG   
of 2022 08:01,  
Atrial flutter has   
replaced Atrial   
fibrillation  
Vent. rate has increased   
BY 46 BPM  
Confirmed by JASMYNE ARIZMENDI   
[facility], GEORGE ([id number]) on   
2023 10:03:22 AM  
  
Referred By: LAURA Murphy Electronically   
Signed By:GEORGE LEONARD MD [facility]  
  
  
  
Transcribed By: MUS  
Signed By George Leonard MD, [facility]  
23 1003       Normal                                  [facility]  
   
                                                    ECG post procedureon   
023   
   
                                        ECG post procedure  [facility] [address] [place] [id number]  
  
Electrocardiograph   
Report  
Signed  
  
Patient: Enmanuel Arrington   
MR#: [id number]  
  
: 1945   
Acct:[id number]  
  
Age/Sex: 77 / M ADM   
Date: 23  
  
Loc: [facility] Room: Type: [facility]  
Attending Dr: LAURA Murphy DO  
  
Ordering Provider: LAURA Murphy DO  
Date of Service:   
23/  
Accession #:   
([id number]) ECG/ECG   
post procedure: post   
cardioversion  
  
  
Copies to:  
  
  
Test Reason :  
Blood Pressure : 131/089   
mmHG  
Vent. Rate : 084 BPM   
Atrial Rate : 084 BPM  
P-R Int : 240 ms QRS Dur   
: 084 ms  
QT Int : 368 ms P-R-T   
Axes : 022 036 021   
degrees  
QTc Int : 434 ms  
  
Sinus rhythm with marked   
sinus arrhythmia with   
1st degree AV block  
Otherwise normal ECG  
When compared with ECG   
of 2023 10:01,   
(Unconfirmed)  
Sinus rhythm has   
replaced Atrial flutter  
Confirmed by JASMYNE ARIZMENDI   
F[facility], GEORGE ([id number]) on   
2023 10:03:28 AM  
  
Referred By: LAURA Murphy Electronically   
Signed By:GEORGE LEONARD MD [facility]  
  
  
  
Transcribed By: MUS  
Signed By George Leonard MD, FACC  
23 1003       Normal                                  [facility]  
   
                                                    Electrolyteson 2023   
   
                                                    Anion gap   
[Moles/Vol]     12.4 mmol/L     Normal          6.0-15.0        [facility]  
   
                                        Comment on above:   Result Comment: PERF  
ORMED BY:  
[place] [address]  
[phone]  
PATHOLOGIST MEDICAL DIRECTOR  
NAKITA BLOUNT M.D.   
   
                                                            Performed By: #### L  
YTES ####  
[facility]  
[address] [place] [address] [place]   
   
                      Chloride [Moles/Vol] 102 mmol/L Normal          [facility]  
   
                                        Comment on above:   Performed By: #### L  
YTES ####  
[facility]  
[address] [place] [address] [place]   
   
                      CO2 [Moles/Vol] 24.8 mmol/L Normal     22.0-30.0  [facility]  
   
                                        Comment on above:   Performed By: #### L  
YTES ####  
[facility]  
[address] [place] [address] [place]   
   
                                                    Potassium   
[Moles/Vol]     4.2 mmol/L      Normal          3.5-5.1         [facility]  
   
                                        Comment on above:   Performed By: #### L  
YTES ####  
[facility]  
[address] [place] [address] [place]   
   
                      Sodium [Moles/Vol] 135 mmol/L Low        136-146    [facility]  
   
                                        Comment on above:   Performed By: #### L  
YTES ####  
[facility]  
[address] [place] [address] [facility]   
   
                                                    No Panel Informationon    
   
                                 12.4\S\12.4 Normal     6.0-15.0   [facility]  
   
[employer]   
250 DO  
Work Phone:   
[phone]  
   
                                        Comment on above:   PERFORMED BY:[facility] [place], [place] [id number] MEDICAL   
DIRECTORNAKITA BLOUNT M.D.   
   
                                 24.8\S\24.8 Normal     22.0-30.0  [facility]  
   
[employer]   
250 DO  
Work Phone:   
[phone]  
   
                                 102\S\102  Normal          [facility]   
[employer]   
250 DO  
Work Phone:   
[phone]  
   
                                 4.2\S\4.2  Normal     3.5-5.1    [facility]   
[employer]   
250 DO  
Work Phone:   
[phone]  
   
                                                135\S\135       below low   
threshold                 136-146                   [facility]   
[employer]   
250 DO  
Work Phone:   
[phone]  
   
                                                    Electrocardiogram 12 Leadon   
2023   
   
                                                    Electrocardiogram 12   
Lead                                    Ventricular Rate  
95  
Atrial Rate  
250  
QRS Duration  
78  
Q-T Interval  
356  
QTC Calculation(Bazett)  
447  
R Axis  
36  
T Axis  
33  
QRS Count  
16  
Q Onset  
222  
T Offset  
400  
QTC Fredericia  
414  
Diagnosis Class  
Abnormal  
Diagnosis  
Atrial flutter with   
variable A-V block  
Abnormal ECG  
When compared with ECG   
of 14-DEC-2022 09:07,  
Atrial flutter has   
replaced Sinus rhythm  
ST no longer elevated in   
Inferior leads  
T wave amplitude has   
increased in Anterior   
leads  
Confirmed by Brett Correa ([id number]) on 2023   
10:20:21 AM         Normal                                  [facility]  
   
                                                    Office Visit (Cardiology)on   
2023   
   
                                        Follow-up visit     Diagnoses/Problems  
Assessed  
Atypical atrial flutter   
(427.32) (I48.4)  
Atrial fibrillation   
(427.31) (I48.91)  
Nonischemic   
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   
(I42.8)  
Orders  
Atrial fibrillation,   
Nonischemic   
cardiomyopathy  
Renew: Carvedilol 3.125   
MG Oral Tablet; take 1   
tablet by mouth twice a   
day with  
MORNING AND EVENING MEAL  
Atypical atrial flutter  
Cardioversion;   
Status:Active; Requested   
for:2023;  
Chief Complaint  
ENMANUEL ARRINGTON is being seen   
for a 3 week follow-up   
of atrial fibrillation,   
atrial flutter and s/p   
PVI with adjuvant lesion   
RFA with Dr. Hernandes   
2022.  
  
Adult Risk Screening  
There are no   
spiritual/cultural   
practices/values/needs   
that are important to   
know  
Initial Fall Risk   
Screening:  
ENMANUEL has not fallen in   
the last 6 months. ENMANUEL   
does not have a fear of   
falling. He does not   
need assistance with   
sitting, standing or   
walking. Does not need   
assistance walking in   
his home. He does not   
need assistance in an   
unfamiliar setting. The   
patient is not using an   
assistive device.  
Pain Scale: On a scale   
of 0 to 10, the patient   
rates the pain at 0.  
  
Living Will.  
Living Will: No living   
will on file.  
Healthcare POA: No   
healthcare proxy on   
file.  
Declaration of Mental   
Health Treatment: No   
mental health treatment   
on file.  
Tobacco Screening: ENMANUEL   
does not use tobacco.   
Patient   
Declined/Screening not   
indicated.  
Domestic Violence   
Screen: Does not feel   
threatened or abused   
physically, emotionally   
or sexually. Do you feel   
UNSAFE?  
The patient feels safe   
in the home.  
Depression/Suicide   
Screening:  
During the past 2 weeks,   
the patient has not felt   
down, depressed or   
hopeless.  
During the past 2 weeks,   
the patient has not felt   
little interest or   
pleasure in doing   
things.  
He does not have a risk   
of suicide.  
He has not had thoughts   
of harming others.  
Procedure or Sedation   
Areas:  
patient has not had   
alcohol, recreational   
drugs, or prescription   
drugs for non-medical   
reasons this morning.  
Nutrition Screening:  
In the past month, there   
was not a day when I or   
anyone in my family went   
hungry because there was   
not enough food.  
Patient Education: The   
patient denies that they   
or the person with them   
has problems with   
hearing, speaking,   
seeing, moving around or   
learning  
The patient is   
comfortable filling out   
medical forms.  
  
History of Present   
Illness  
Enmanuel Arrington is a 76 y/o   
male initially referred   
by Dr Murphy for   
evaluation on AF.  
PMH includes NICM, R   
should rotator cuff   
injury, cocaine abuse x   
17 yrs (rehab in [place] now   
sober), oxycodone abuse   
(started in ) w/   
accidental overdose and   
AF.  
Treatment of his AF   
includes carvedilol. Pt   
has not required DCCV or   
AAD?s.  
Symptoms of his AF   
include palpitations and   
DELEON.  
Pt recently followed up   
with his PCP in the end   
of April/early May and   
pt was noted to be in   
AF. Pt had a history of   
irregular heartbeat but   
no diagnosis of AF. Pt   
was started on Eliquis.  
Pt established with Dr Murphy with the plan   
for DCCV. His DCCV was   
cancelled because pt had   
COVID.. Pt has a history   
of Oxycodone abuse which   
he was started on   
initially in  for a   
rotator cuff injury. Pt   
has been obtaining the   
medications on the   
street not through   
prescription. Pt had a   
recent accidental   
overdose which required   
ED visit an reversal   
with Narcan. Pt was take   
to rehab by his daughter   
about 1 week ago but   
left the same day.  
Pt reports that he has   
been sober since August  
Echo 2022: EF 40-45%,   
septal akinesis, LA   
mildly dilated  
Stress test 2022: no   
ischemia or myocardial   
infarction  
Now s/p PVI and adjuvant   
lesion RFA with Dr. Hernandes 2022  
Patient unfortunately   
caught a URI around   
Ana and also   
experienced a fever. He   
was started on   
antibiotics around    
and has a couple of more   
days still before they   
are complete.  
ECG 2023 Atypical   
Aflutter HR 95 bpm.  
TODAY Patient presents   
for 3 week follow up   
post ablation. He denies   
any arrhythmia symptoms.   
He is still coughing,   
especially at night due   
to his URI, but the SOB   
is improving after the   
antibiotics. He denies   
any palpitations,   
syncope, heart racing,   
and chest pain. He   
denies any   
bleeding/bruising/swelli  
ng at the right groin   
site. His wife is   
present with him and   
says he is taking all   
his medication properly.   
She would like a refill   
on the Coreg with the   
correct dosage so she   
doesn't have to cut the   
pill in half  
  
Active Problems  
Problems  
Atrial fibrillation   
(427.31) (I48.91)  
Dyspnea on exertion   
(786.09) (R06.09)  
Former smoker (V15.82)   
(Z87.891)  
quit somking at age 34  
Nonischemic   
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   
(I42.8)  
Overweight with body   
mass index (BMI) of 25   
to 25.9 in adult   
(278.02,V85.21)  
(E66.3,Z68.25)  
Surgical History  
Problems  
History of Middle ear   
surgery  
History of Oral surgery  
History of Tonsillectomy  
Current Meds  
  
Medication   
NameInstruction  
Carvedilol 6.25 MG Oral   
TabletTAKE 0.5 TABLET   
Twice daily  
Eliquis 5 MG Oral   
TabletTake 1 tablet   
twice daily  
Eplerenone 25 MG Oral   
TabletTAKE 1 TABLET   
DAILY.  
Hibiclens 4 % External   
LiquidUse as d (more   
content not included)... Normal                                   [employer]  
   
                                                    Tobacco Screening.on   
023   
   
                                                    Tobacco use status   
[facility]            b) No                                           [facility]  
[facility] [address]   
[place]  
Work Phone:   
[phone]  
   
                                                    Daily Progress Note-Electrop  
hysiologyon 2022   
   
                                                    Daily Progress   
Note-Electrophysiolo  
gy                                      Service:   
Electrophysiology  
  
Subjective Data:  
ENMANUEL ARRINGTON is a 77   
year old Male who is   
Hospital Day # 2.  
  
Overnight Events:   
Patient had an   
uneventful night.  
Additional Information:  
-Pt was seen and   
examined at 9:10AM and   
tele reviewed  
-Pt was seen by EP   
fellow Nando Johnson MD   
this AM also  
-denies   
CP/dyspnea/LH/palps; ate   
breakfast, states feels   
 really good, the best  
since July   
-Has voided and   
ambulated without issues  
  
  
Objective Data:  
  
Objective Information:  
T PRBPMAPSpO2  
Value36.51653540/8798%  
Date/Time 7:   
7: 7:   
7: 7:00  
Range(36.2C - 36.5C )   
(74 - 84 ) (18 - 19 )   
(119 - 152 )/ (66 - 87 )  
(95% - 98% )  
  
  
Tele reviewed: SR   
70s-80s, occas PACs  
  
EKG post ablation   
22 reviewed (much   
artifact and wandering   
baseline but  
SR/SA, , QRS 84,   
QTc 437, appears no   
acute changes)  
  
Physical Exam by System:  
  
Constitutional: A+O x 3,   
no distress  
Eyes: Anicteric  
Head/Neck: NC/AT  
Respiratory/Thorax:   
harsh bases o/w CTAB  
Cardiovascular: RRR, no   
rub  
Gastrointestinal: soft,   
NT/ND +BS  
Extremities: no LE edema  
  
RFV access site-no   
bleeding, hematoma, or   
ecchymosis  
Psychological:   
Appropriate speech and   
affect.  
Skin: Warm and dry  
  
Medication:  
  
Medications:  
  
Continuous Medications  
------------------------  
--------  
No continuous   
medications are active  
  
Scheduled Medications  
------------------------  
--------  
  
1. Apixaban: 5 mg Oral   
Every 12 Hours  
2. Carvedilol: 3.125 mg   
Oral 2 Times a Day  
3. Eplerenone: 25 mg   
Oral Daily  
4. Furosemide: 40 mg   
Oral Daily  
5. Pantoprazole: 40 mg   
Oral 2 Times a Day  
  
PRN Medications  
------------------------  
--------  
  
1. Ibuprofen: 400 mg   
Oral Every 8 Hours  
2. Sodium Chloride 0.9%   
Injectable Flush: 10 mL   
IntraVenous Flush Every   
8  
Hours and as Needed  
  
  
Assessment and Plan:  
Code Status:  
Code StatusFull Code  
  
Advance Care Planning:  
Advance Care Planning:   
Patient didn't wish to   
or was unable to provide   
advance  
care plan  
  
Assessment:  
78yo M with NICM EF   
40-45% by Echo at   
[facility] 2022   
(Lexiscan nuc stress  
2022 negative for   
ischemia/infarct; not   
gated due to AF), COVID   
recovered,  
MARTINE (cocaine, now sober;   
oxycodone s/p 30d rehab   
2022), and AF on   
Eliquis.  
Follows with cards Dr Agustín Murphy.   
Yesterday underwent   
PVI/adjuvant  
lesions, L-sided AFL   
ablation and CTI line   
(see report). Feels well   
today and  
in NSR. Home today per   
EP fellow.  
  
#AF, s/p PVI, and   
ablation of 2 AFLs   
22; on Eliquis,   
PPI x 1 month for  
esophageal ppx  
#NICM-->on Coreg,   
losartan, eplerenone,   
Lasix  
  
-MED changes: Protonix   
40mg BID x 4 wks post   
ablation (eRx sent by   
fellow) o/w  
no changes in home meds  
-post procedure written   
instructions reviewed   
with pt and all   
questions  
answered  
-Pt and wife were   
advised on importance of   
adherence to Eliquis   
regimen without  
interruption for the 1st   
30 days post ablation   
(unless in event of   
emergency)  
-Pt was advised to cont   
regular F/u with primary   
cards Dr Murphy for  
medication optimization  
-F/u PCP 2 wks (pt to   
call)  
-EP F/u 1 month with   
Sivan Schuler CNP on   
22 in [place]  
  
DORITA Conklin,   
PAConstantinC, [facility]  
Inpatient Cardiac   
Electrophysiology  
Personal pager: [phone]   
()  
  
  
  
  
Electronic Signatures:  
Gustavo Diehl (PAC)   
(Signed 14-Dec-2022   
10:10)  
Authored: Service,   
Subjective Data,   
Objective Data,   
Assessment and Plan,   
Note  
Completion  
  
  
Last Updated:   
14-Dec-2022 10:10 by   
Gustavo Diehl (PAC) Normal                                  [facility]  
   
                                                    Discharge Planning [id number]  
2022   
   
                                                    Discharge Planning   
Note2                                   Discharge Planning:  
Anticipated Discharge   
[id number]  
Discharge Planning  
2022 Discharge   
note. Pt stable for d/c.   
No pain reported.   
Reviewed  
discharge instructions   
with patient and wife.   
Answered any questions.   
IVs and  
telemetry removed. Pt   
discharged to home   
accompanied by wife via   
personal car.  
Pt has all personal   
items. Pt to leave unit   
via wheelchair.  
  
Assessment:  
Discharge Planning   
Assessment   
Date14-Dec-2022  
Lives Withspouse(1)  
Living   
Arrangementshouse(1)  
Stated Reason for   
AdmissionA-flutter   
ablation(1)  
Arrived Fromhome (1)  
Resource/Environmental   
Concernsnone(1)  
Anticipated Transition   
[place](1)  
Services Anticipated at   
Transitionnon(1)  
  
Discharge Documentation:  
Discharge/Transfer   
Date/Time14-Dec-2022   
11:20  
Discharged Accompanied   
Byspouse  
Transportation   
Methodprivate car  
Discharge Modewheelchair  
Code StatusCode Status   
order at time of   
discharge: Full Code  
Discharge Order   
Writtenyes  
[place] DNR Form Sent with   
Patient and/or Familyno  
Valuables/Medications/Be  
longings Returnedyes  
Final Disposition.Home  
  
  
  
Electronic Signatures:  
Emely Richardson (RN)   
(Signed 14-Dec-2022   
11:21)  
Authored: Discharge   
Planning, Assessment,   
Discharge Documentation  
  
  
Last Updated:   
14-Dec-2022 11:21 by   
Emely Richardson (CHEKO)  
  
References:  
1. Data Referenced From   
 Patient Profile - Adult   
v2  13-Dec-2022 22:11 Normal                                  [facility]  
   
                                                    Electrocardiogram 12 Leadon   
2022   
   
                                                    Electrocardiogram 12   
Lead                                    Ventricular Rate  
83  
Atrial Rate  
81  
QRS Duration  
80  
Q-T Interval  
352  
QTC Calculation(Bazett)  
413  
R Axis  
32  
T Axis  
48  
QRS Count  
14  
Q Onset  
224  
T Offset  
400  
QTC Fredericia  
392  
Diagnosis Class  
Abnormal  
Diagnosis  
Sinus rhythm Sinus   
arrhythmia with 1st   
degree A-V block  
Low voltage QRS,   
consider pulmonary   
disease, pericardial   
effusion, or normal   
variant  
Nonspecific ST   
abnormality  
Abnormal ECG  
No previous ECGs   
available  
Confirmed by Sachin Gu ([id number]) on   
2022 12:11:06 PM Normal                                  [facility]  
   
                                                    No Panel Informationon    
   
                                                            [email or web address]  
[email or web address]  
[email or web address]  
[email or web address]  
laine?[email or web address]&  
Date=2022&Time=09%  
3a07%3a40%3a00&TestType=  
ECG&Site=1&OutputType=PD  
F&Ext=PDF                                                   [facility]  
[facility]   
[address]   
[place]  
Work Phone:   
[phone]  
   
                                                            Sinus rhythm Sinus   
arrhythmia with 1st   
degree A-V block                                            [facility]  
[facility]   
[address]   
[place]  
Work Phone:   
[phone]  
   
                                 Abnormal                         [facility]  
[facility] [place]   
[address]   
[place]  
Work Phone:   
[phone]  
   
                                 392 1                            [facility]  
[facility]   
[address]   
[place]  
Work Phone:   
[phone]  
   
                                 400 1                            [facility]  
[facility] [place]   
[address]   
[place]  
Work Phone:   
[phone]  
   
                                 224 1                            [facility]  
[facility]   
[address]   
[place]  
Work Phone:   
[phone]  
   
                                 14 1                             [facility]  
[facility] [place]   
[address]   
[place]  
Work Phone:   
[phone]  
   
                                 48 1                             [facility]  
[facility]   
[address]   
[place]  
Work Phone:   
[phone]  
   
                                 32 1                             [facility]  
[facility]   
[address]   
[place]  
Work Phone:   
[phone]  
   
                                 413 1                            [facility]  
[facility]   
[address]   
[place]  
Work Phone:   
[phone]  
   
                                 352 1                            [facility]  
[facility]   
[address]   
[place]  
Work Phone:   
[phone]  
   
                                 80 1                             [facility]  
[facility]   
[address]   
[place]  
Work Phone:   
[phone]  
   
                                 81 1                             [facility]  
[facility]   
[address]   
[place]  
Work Phone:   
[phone]  
   
                                 83 1                             [facility]  
[facility] [address]   
[place]  
Work Phone:   
[phone]  
   
                                                    Order Reconciliationon    
   
                                        Order Reconciliation Page 1  
  
Discharge Reconciliation   
Document  
  
  
  
Reconciliation Type:   
Discharge requested on   
behalf of Jessica Johnson  
(Resident) done by   
Jessica Johnson (MD   
(Resident))  
  
Discharge -   
Reconciliation:   
14-Dec-2022 08:00 by:   
Jessica Johnson (MD  
(Resident))  
  
Home Medications   
EnteredHOME MEDICATIONS   
AT DISCHARGE   
DateReconciliation  
Comment/ Additional   
Information  
acetaminophen 500 mg   
oral tablet 2 tab(s)   
orally every 6 hours, As   
Needed  
13-Dec-2022 13:32   
acetaminophen 500 mg   
oral tablet 2 tab(s)   
orally every 6  
hours, As Needed   
13-Dec-2022 13:32   
acetaminophen 500 mg   
oral tablet is  
continued as   
acetaminophen 500 mg   
oral tablet  
apixaban 5 mg oral   
tablet 1 tab(s) orally 2   
times a day 13-Dec-2022   
13:27  
apixaban 5 mg oral   
tablet 1 tab(s) orally 2   
times a day 13-Dec-2022   
13:27  
apixaban 5 mg oral   
tablet is continued as   
apixaban 5 mg oral   
tablet  
carvedilol 6.25 mg oral   
tablet 1 tab(s) orally 2   
times a day (with meals)  
13-Dec-2022 13:30   
Discontinued;   
Discontinue from ORM  
  
carvedilol 6.25 mg oral   
tablet is not required  
eplerenone 25 mg oral   
tablet 1 tab(s) oral   
once a day 2022   
12:39  
eplerenone 25 mg oral   
tablet 1 tab(s) oral   
once a day 2022   
12:39  
eplerenone 25 mg oral   
tablet is continued as   
eplerenone 25 mg oral   
tablet  
furosemide 40 mg oral   
tablet 1 tab(s) orally   
once a day 13-Dec-2022   
13:32  
furosemide 40 mg oral   
tablet 1 tab(s) orally   
once a day 13-Dec-2022   
13:32  
furosemide 40 mg oral   
tablet is continued as   
furosemide 40 mg oral   
tablet  
losartan 25 mg oral   
tablet 1 tab(s) oral   
once a day 2022   
12:39  
losartan 25 mg oral   
tablet 1 tab(s) oral   
once a day 2022   
12:39  
losartan 25 mg oral   
tablet is continued as   
losartan 25 mg oral   
tablet  
multivitamin Multiple   
Vitamins oral tablet 1   
tab(s) oral once a day  
2022 12:39   
multivitamin Multiple   
Vitamins oral tablet 1   
tab(s) oral  
once a day 2022   
12:39 multivitamin   
Multiple Vitamins oral   
tablet is  
continued as   
multivitamin Multiple   
Vitamins oral tablet  
omeprazole 40 mg oral   
delayed release capsule   
1 cap(s) orally once a   
day, As  
Needed 13-Dec-2022 13:49   
Discontinued;   
Discontinue from ORM  
  
omeprazole 40 mg oral   
delayed release capsule   
is not required  
  
  
Current OrdersDateHOME   
MEDICATIONS AT DISCHARGE   
DateReconciliation   
Comment/  
Additional Information  
Apixaban Tablet   
(ELIQUIS)DOSE = 5 mg   
Oral Every 12 Hours   
14-Dec-2022 04:23  
Apixaban is not required  
Carvedilol Tablet   
(COREG)DOSE = 3.125 mg   
Oral 2 Times a Day   
13-Dec-2022  
18:49 carvedilol 3.125   
mg oral tablet 1 tab(s)   
orally 2 times a day  
14-Dec-2022 07:59   
Carvedilol is continued   
as carvedilol 3.125 mg   
oral tablet  
and modified  
Eplerenone Tablet   
(INSPRA)DOSE = 25 mg   
Oral Daily 13-Dec-2022   
18:49  
Eplerenone is not   
required  
Furosemide Tablet   
(LASIX)DOSE = 40 mg Oral   
Daily 13-Dec-2022 18:49  
Furosemide is not   
required  
Ibuprofen Tablet (ADVIL,   
MOTRIN)DOSE = 400 mg   
Oral Every 8 Hours, PRN   
Pain -  
Mild (1-3) 13-Dec-2022   
18:39 Ibuprofen is not   
required  
Pantoprazole Enteric   
Coated Tablet   
(PROTONIX)DOSE = 40 mg   
Oral 2 Times a Day  
13-Dec-2022 18:49   
pantoprazole 40 mg oral   
delayed release tablet 1   
tab(s)  
orally 2 times a day   
14-Dec-2022 08:00   
Prescription is created   
for  
pantoprazole 40 mg oral   
delayed release tablet  
Sodium Chloride 0.9%   
Injectable Flush via   
Peripheral LineVolume =   
10 mL  
IntraVenous Flush Every   
8 Hours and as Needed   
13-Dec-2022 18:39  
Sodium Chloride 0.9%   
Injectable Flush is not   
required  
  
  
All Active Home   
Medications at time of   
Discharge   
Reconciliation:   
14-Dec-2022  
08:00  
acetaminophen 500 mg   
oral tablet 2 tab(s)   
orally every 6 hours, As   
Needed  
apixaban 5 mg oral   
tablet 1 tab(s) orally 2   
times a day  
carvedilol 3.125 mg oral   
tablet 1 tab(s) orally 2   
times a day  
eplerenone 25 mg oral   
tablet 1 tab(s) oral   
once a day  
furosemide 40 mg oral   
tablet 1 tab(s) orally   
once a day  
losartan 25 mg oral   
tablet 1 tab(s) oral   
once a day  
multivitamin Multiple   
Vitamins oral tablet 1   
tab(s) oral once a day  
pantoprazole 40 mg oral   
delayed release tablet 1   
tab(s) orally 2 times a   
day                 Normal                                  [facility]  
   
                                                    Patient Profile - Adult v2on  
 2022   
   
                                                    Patient Profile -   
Adult v2                                Profile:  
Initial Info:  
How to be AddressedBarry  
Spoken Language   
PreferredEnglish  
Source of   
Informationpatient  
Stated Reason for   
AdmissionA-flutter   
ablation  
Wants Family/Rep   
Notified of   
Admissionn/a; family   
present  
Notify PCPnotify PCP  
Informed of Patient   
Visiting Rightsyes  
[place]  
[place]   
Belongingsremains with   
patient  
Medications Brought to   
Hospitalno  
  
General Health:  
Weight in kg77.7   
kilogram(s)  
Weight in xli618.2   
pound(s)  
Weight Methodactual   
(measured)  
Scale Typestanding  
Height in cm167.5   
centimeter(s)  
Height in feet5 feet  
Height in inches5.98   
inch(es)  
Height Methodstated  
BMI (kg/m2)27.694 square   
meter  
  
RSP Based Care:  
How would you like to   
participate in your   
carestay informed  
What is the number one   
concern for you during   
this hospitalizationstay  
informed  
What is the most   
important thing we can   
do to support you during   
this  
hospitalizationstay   
informed  
Is there anything we   
need to know to best   
care for youstay   
informed  
  
Substance:  
Smoking Statusformer   
smoker  
Drug Usehistory of abuse  
  
Health Mgmt:  
Symptoms/Conditions   
Managed at   
Homecardiovascular  
Cardiovascular   
Managementmanaged  
  
Relationship/Environ:  
Resource/Environmental   
Concernsnone  
Primary Source of   
Support/Comfortspouse  
Lives Withspouse  
Living Arrangementshouse  
Services Anticipated at   
Transitionnone  
[facility]   
[facility]  
Significant   
IndicatorsComplete  
  
  
  
Information Review:  
Allergies, Home Meds and   
Significant Events have   
been Reviewed and   
Verified  
with Patient/Familyyes  
  
ALLERGY, INTOLERANCE,   
ADVERSE EVENT:  
Allergies:  
No Known Allergies:   
Active  
  
  
Electronic Signatures:  
Melvina Kim) (Signed 13-Dec-2022   
22:18)  
Authored: Initial Info,   
General Health, RSP   
Based Care, Substance,   
Health  
Mgmt,   
Relationship/Environ,   
Additional Information  
  
  
Last Updated:   
13-Dec-2022 22:18 by   
Melvina Kim)                Normal                                  [facility]  
   
                                                    ACT-HIGH RANGEon 2022   
   
                      ACT-HIGH RANGE 365 SECONDS High       96 - 152   [facility]  
   
                                        Comment on above:   Result Comment: Note  
 new reference range as of 2019.  
Target ACT range will vary based on the patient population,  
clinical status, and surgical intervention occurring.   
   
                                                            Performed By: #### A  
CTP ####[id number] [place] [address]   
   
                      ACT-HIGH RANGE 354 SECONDS High       96 - 152   [facility]  
   
                                        Comment on above:   Result Comment: Note  
 new reference range as of 2019.  
Target ACT range will vary based on the patient population,  
clinical status, and surgical intervention occurring.   
   
                                                            Performed By: #### A  
CTP ####  
[facility]  
[id number]  
[place] [id number]   
   
                      ACT-HIGH RANGE 360 SECONDS High       96 - 152   [facility]  
   
                                        Comment on above:   Result Comment: Note  
 new reference range as of 2019.  
Target ACT range will vary based on the patient population,  
clinical status, and surgical intervention occurring.   
   
                                                            Performed By: #### A  
CTP ####  
[facility]  
[id number]  
[place] [id number]   
   
                      ACT-HIGH RANGE 361 SECONDS High       96 - 152   [facility]  
   
                                        Comment on above:   Result Comment: Note  
 new reference range as of 2019.  
Target ACT range will vary based on the patient population,  
clinical status, and surgical intervention occurring.   
   
                                                            Performed By: #### A  
CTP ####[id number] [id number]   
   
                                                    Admission Risk Screen - Adul  
ton 2022   
   
                                                    Admission Risk   
Screen - Adult                          Allergies:  
Allergies:  
No Known Allergies:  
  
Patient Verification:  
New W ID Band Applied in   
my Departmentyes  
Patient Identity   
Verified Bypatient  
ID Band FULL Name,   
include Middle, spelling   
matches patient's ID   
used for  
verificationyes  
ID Band  Matches   
Patient ID used for   
Verficationyes  
ID Band MRN Matches EMR   
MRNyes  
  
Visitor Restriction:  
Coronavirus Visitor   
Restriction: Reasonable   
restrictions to   
in-person  
visitors will be   
observed due to current   
coronavirus pandemic.  
  
Travel History:  
COVID-19 Screening   
Completedno exposure or   
symptoms  
Travel or Exposure Past   
30 DaysNO travel to   
International locations   
in the  
past 30 days  
Ebola AlertFor   
Ebola-like Symptoms:   
Isolate Patient and   
Notify  
Provider/Infection   
Preventionist  
  
For Contact: Notify   
Provider/Infection   
Preventionist  
  
Advance Directive:  
Advance Directive/DNRno  
Advance Directive   
Information   
Givenpatient/family   
declined  
  
Rodríguez Fall Screen:  
History of falling   
(immediate or   
previous)no (0)  
Secondary Diagnosisyes   
(15)  
Intravenous Therapy/   
Heparin/Saline Lockyes   
(20)  
Gait/Transferringnormal/  
bedrest/wheelchair (0)  
Ambulatory   
Aidsnone/bedrest/nurse   
assist (0)  
Mental Statusoriented to   
own ability (0)  
Score: Low risk (<25).   
Moderate risk (25-44).   
High risk (>44).35  
Rodríguez   
InterventionsMODERATE   
INTERVENTIONS:  
*Low Interventions Plus:  
* falls risk   
band/sticker applied to   
patient,  
*yellow non-skid   
footwear,  
*instruct to call for   
assistance before  
getting out of bed,  
*bed/chair/bedside   
commode/toilet alarms,  
*sensory   
devices/ambulatory aides  
available and in reach,  
*medications reviewed   
for potential  
side effects and care   
planning.  
  
  
Family Violence Screen:  
Are you or have you been   
threatened or abused   
physically, emotionally,   
or  
sexually by anyoneno  
Has anyone ever   
threatened to hurt your   
family or your petsno  
Does anyone try to keep   
you from   
having/contacting other   
friends or doing  
things outside your   
homeno  
Do you feel UNSAFE going   
back to the place where   
you are livingno  
Do you feel anyone has   
exploited or taken   
advantage of you   
financially or of  
your personal propertyno  
Clinical assessment: Are   
there any apparent signs   
of injuries/behaviors   
that  
could be related to   
abuse/neglectno  
Social Service Consult   
for abuse/neglect needed   
this visitno  
  
Functional Screen:  
Functional Screen: In   
the recent/past 2-4   
weeks, patient or family   
have  
noticedno issues that   
require a   
speech/language consult   
at this time  
  
AM-PAC- Basic   
Mobility/Daily Activity:  
Patient baseline   
bedboundno  
Putting on and taking   
off regular lower body   
clothingnone  
Bathing (including   
washing, rinsing,   
drying)none  
Putting on and taking   
off regular upper body   
clothingnone  
Toileting, which   
includes using toilet,   
bedpan or urinalnone  
Taking care of personal   
grooming such as   
brushing teethnone  
Eating Mealsnone  
Daily Activity - Total   
Score24  
  
Learning Assessment   
(Patient):  
Patient is Able to be   
Assessed for Learningyes  
Factors Influencing   
Readiness to   
Learnacuteness of   
illness  
Factors that Impact   
Ability to Learnnone  
Devices/Methods Used to   
Communicatenone  
Learning   
Preferencesaudio  
Cultural   
Considerationsnone  
Developmental   
Considerationsnone  
Episcopalian   
Considerationsnone  
  
Learning Assessment   
(Other Learner):  
Other learner   
availableyes...  
Learnerspouse  
Factors Influencing   
Readiness to   
Learnacuteness of   
illness  
Factors that Impact   
Ability to Learnnone  
Devices/Methods Used to   
Communicatenone  
Learning   
Preferencesaudio  
Cultural   
Considerationsnone  
Developmental   
Considerationsnone  
Episcopalian   
Considerationsnone  
  
Depression Screen:  
During the past month,   
have you often been   
bothered by feeling   
down,  
depressed or hopelessno  
During the past month,   
have you often had   
little interest or   
pleasure in  
doing thingsno  
Have you had any   
thoughts of harming   
anyone elseno  
  
[place] Suicide:  
Risk Screen Not   
Applicable/Able to   
Answerable to be   
screened  
In the Past Month: Have   
you wished you were dead   
or could go to sleep and   
not  
wake upno  
In the Past Month: Have   
you had any actual   
thoughts of killing   
yourselfno  
Lifetime: Have you ever   
done, started to do, or   
prepared to do anything   
to  
end your lifeno  
[place] Suicide   
Risknegative  
  
Adult Nutrition Screen:  
Have you recently lost   
weight without tryingno  
Have you been eating   
poorly because of a   
decreased appetiteno  
Malnutrition Screening   
Tool Score0  
Malnutrition Screening   
Tool RiskMST = 0 or 1   
Not at risk. Eating well   
with  
little or no weight loss  
Nutrition Consult needed   
this visitno  
Can Patient Participate   
in Room Serviceyes  
Patient requires Paper   
Dishes/Plastic   
Utensilsno  
  
Pain Screen:  
Pain Scalenumerical 0-10  
Pain Scale   
Educationteaching   
provided  
Current Pain Level0 =   
None  
Acceptable Pain Level0 =   
None  
Expression of Pain   
(nonverbal)none  
Chronic Painyes  
Chronic Back pain   
locationlower, upper  
  
Spiritual Screen:  
Are there any cultural,   
spiritual (more content   
not included)...    Normal                                  [facility]  
   
                                                    Discharge [id number]   
022   
   
                                        Discharge Profile2  Discharge Orders:  
Anticipated Discharge   
Date:  
Anticipated Discharge   
[id number]  
  
Problem List:  
Prelim Disch Dx:  
Atrial fibrillation:   
Catalog Name:   
Unspecified atrial   
fibrillation  
  
DNAR:  
Code Status at   
Discharge: Full Code  
  
Activity:  
May not drive for 2   
day(s) if you were   
driving prior to your   
procedure.  
  
Diet:  
DietHeart healthy  
  
Additional Orders:  
Additional Instructions  
* You will need to   
continue blood thinner   
(Eliquis) until   
instructed otherwise.  
It is important not to   
interrupt blood thinner   
for any reason (other   
than an  
emergency) during the   
1st month after   
ablation.  
  
* You will be on   
Pantoprazole (a   
heartburn medicine) for   
4 weeks to protect the  
esophagus as it can   
become irritated with   
ablation. It is very   
important that  
you take this   
medication.  
  
* All other medications   
will generally remain   
the same unless you are   
told  
otherwise. Resume taking   
your home medications   
today (including blood   
thinner)  
as listed on the   
discharge instructions.  
  
* In the first week   
post-ablation you should   
take it easy. No heavy   
lifting or  
heavy exercise, no   
treadmill. You can use   
the stairs if needed but   
go slowly  
and minimize the number   
of times up and down.  
  
* Some minor bruising is   
common at each groin   
access site with minor   
soreness  
as if you had banged the   
area. Bruising may   
occasionally be seen to   
extend down  
the leg. This is normal   
as is an occasional   
small quarter sized bump   
in the  
area. If larger swelling   
or more significant pain   
occurs at the area,   
please  
contact the office or go   
the nearest Emergency   
Room.  
  
* You may have some   
minor chest pain for the   
next week or so. The   
pain will  
often worsen with a deep   
breath and be better   
when leaning forward.   
This is  
pericardial chest pain   
from the ablation and is   
generally not of   
concern. It  
should resolve within a   
week although it might   
increase for a day or so   
after  
the ablation.  
  
* If you develop   
unexplained fevers   
exceeding 100 degrees   
anytime within the  
first 3 weeks   
post-ablation, you need   
to contact the office.   
Low grade fevers  
of around 99 degrees are   
common in the first day   
or so post-ablation.  
  
* Atrial fibrillation   
(AFib) can recur in all   
patients who undergo   
this  
ablation for up to 4-8   
weeks post-ablation. The   
ablation itself can   
cause  
inflammation   
(pericarditis) in the   
atria and this can cause   
AFib. Some patients  
will actually experience   
an increased amount of   
atrial arrhythmia early   
after  
ablation. Approximately   
1/3 of patients will   
have this early   
recurrence of  
AFib. Medications should   
be continued and your   
heart rate controlled.   
Nothing  
else needs be done   
initially except waiting   
as in many cases these   
episodes of  
AFib will prove self   
limited.  
  
* Continue to follow up   
with your primary care   
physician, primary   
cardiologist,  
and any other   
specialists you normally   
see.  
  
  
Call Provider If   
(Homegoing Patients):  
Breathing faster than   
normal.  
  
Fever of 100.4 F (38 C)   
or higher.  
  
Chills.  
  
Acting very sleepy and   
difficult to awaken.  
  
Any new concerning   
symptoms.  
  
Passing out.  
  
Cath Lab Interventional:  
Patient Instructions,   
Next 24 hours:  
DO NOT drive a car,   
operate machinery or   
power tools. It is   
recommended that a  
responsible adult be   
with you for the first   
24 hours.  
  
DO NOT drink any   
alcoholic drinks or take   
any non-prescriptive   
medications that  
contain alcohol for the   
first 24 hours.  
  
DO NOT make any   
important decisions for   
the first 24 hours.  
  
Activity:  
You are advised to go   
directly home from the   
hospital.  
  
DO NOT lift anything   
heavier than 10 pounds   
for one week, this   
allows for  
proper healing of the   
groin.  
  
No excessive exercise or   
treadmill use for one   
week. You may walk and   
do  
stairs, slowly.  
  
No sexual activities for   
24 hours after you   
arrive home.  
  
Wound Care:  
If slight bleeding   
should occur at site,   
lie down and have   
someone apply firm  
pressure just above the   
puncture site for 5   
minutes. If it continues   
or is  
profuse, call 911.   
Always notify your   
doctor if bleeding   
occurs.  
  
Keep site clean and dry.   
Let air dry or you may   
use a simple bandaid.  
  
Gently cleanse the   
puncture site in your   
groin with soap and   
water only.  
  
You may experience some   
tenderness, bruising or   
minimal inflammation. If   
you  
have any concerns, you   
may contact the Cath Lab   
or if any of these   
symptoms  
become excessive,   
contact your   
cardiologist or go to   
the emergency room.  
  
No tub baths, soaking,   
or swimming for one   
week.  
  
May shower the next day   
after your procedure.  
  
Other Instructions:  
If you have any   
questions about the   
effects of the sedative   
drugs or groin  
care, call the physician   
who performed your   
procedure.  
  
Provider FINAL REVIEW of   
Orders:  
Final Review:  
Final Review of   
Medication   
Reconciliation and   
Orders Completedby   
Physician  
Reviewing   
ProviderJessica Johnson MD (Resident) at   
14-Dec-2022 08:02:26  
  
Appointments:  
Follow-Up Appointment   
01:  
Physician/Dept/ServiceP   
(more content not   
included)...        Normal                                  [facility]  
   
                                                    No Panel Informationon    
   
                                                365 {SECONDS}   above high   
threshold                 96 - 152                  [facility]  
[facility] [address]   
[place]  
Work Phone:   
[phone]  
   
                                        Comment on above:   Note new reference danielle padilla as of 2019. Target ACT range   
will   
vary based on the patient population, clinical status, and   
surgical intervention occurring.   
   
                                                354 {SECONDS}   above high   
threshold                 96 - 152                  [facility]  
[facility] [place]   
[address]   
[place]  
Work Phone:   
[phone]  
   
                                        Comment on above:   Note new reference r  
valerie as of 2019. Target ACT range   
will   
vary based on the patient population, clinical status, and   
surgical intervention occurring.   
   
                                                360 {SECONDS}   above high   
threshold                 96 - 152                  [facility]  
[facility] [place]   
[address]   
[place]  
Work Phone:   
[phone]  
   
                                        Comment on above:   Note new reference r  
valerie as of 2019. Target ACT range   
will   
vary based on the patient population, clinical status, and   
surgical intervention occurring.   
   
                                                361 {SECONDS}   above high   
threshold                 96 - 152                  [facility]  
[facility] [place]   
[address]   
[place]  
Work Phone:   
[phone]  
   
                                        Comment on above:   Note new reference r  
valerie as of 2019. Target ACT range   
will   
vary based on the patient population, clinical status, and   
surgical intervention occurring.   
   
                                                    Order Reconciliationon    
   
                                        Order Reconciliation Page 1  
  
Admission Reconciliation   
Document  
  
  
Reconciliation Type:   
Admission requested on   
behalf of Jessica Johnson  
(Resident) done by   
Jessica Johnson   
(Resident))  
  
Admission -   
Reconciliation:   
13-Dec-2022 18:49 by:   
Jessica Johnson (MD  
(Resident))  
  
Home   
MedicationsEnteredLast   
Dose TakenReconciled   
with current Order  
Reconciliation Comment/   
Additional Information  
acetaminophen 500 mg   
oral tablet 2 tab(s)   
orally every 6 hours, As   
Needed  
[id number],   
2022 Reviewed and Held  
apixaban 5 mg oral   
tablet 1 tab(s) orally 2   
times a   
[id number]  
2  
AM Reviewed and Held  
carvedilol 6.25 mg oral   
tablet 1 tab(s) orally 2   
times a day (with meals)  
13-Dec-202212-Dec-2022   
PM Carvedilol Tablet   
(COREG)DOSE = 3.125 mg   
Oral 2  
Times a Daycarvedilol   
6.25 mg oral tablet   
continued as the   
inpatient order  
Carvedilol  
eplerenone 25 mg oral   
tablet 1 tab(s) oral   
once a   
day13-Dec-202212-Dec-202  
2  
Eplerenone Tablet   
(INSPRA)DOSE = 25 mg   
Oral Dailyeplerenone 25   
mg oral  
tablet continued as the   
inpatient order   
Eplerenone  
furosemide 40 mg oral   
tablet 1 tab(s) orally   
once a   
[id number]  
2  
AM Furosemide Tablet   
(LASIX)DOSE = 40 mg Oral   
Dailyfurosemide 40 mg   
oral  
tablet continued as the   
inpatient order   
Furosemide  
losartan 25 mg oral   
tablet 1 tab(s) oral   
once a   
[id number]  
2  
Reviewed and Held  
multivitamin Multiple   
Vitamins oral tablet 1   
tab(s) oral once a   
day13-Dec-2022  
12-Dec-2022 Reviewed and   
Held  
omeprazole 40 mg oral   
delayed release capsule   
1 cap(s) orally once a   
day, As  
Needed13-Dec-2022Novembe  
r,  Pantoprazole   
Enteric Coated Tablet  
(PROTONIX)DOSE = 40 mg   
Oral 2 Times a   
Dayomeprazole 40 mg oral   
delayed release  
capsule continued as the   
inpatient order   
Pantoprazole  
  
  
Additional Current   
Orders  
Ibuprofen Tablet (ADVIL,   
MOTRIN)DOSE = 400 mg   
Oral Every 8 Hours, PRN   
Pain -  
Mild (1-3)  
Sodium Chloride 0.9%   
Injectable Flush via   
Peripheral LineVolume =   
10 mL  
IntraVenous Flush Every   
8 Hours and as Needed Normal                                  [facility]  
   
                                                    Covid-19 PCR (CVDTBH)on    
   
                                                    SARS-CoV-2   
(COVID-19) RNA   
ARMANDO+probe Ql (Unsp   
spec)               Not detected        Normal              NOT   
DETECTED                                [facility]  
   
                                        Comment on above:   Result Comment: This  
 test is not yet approved or cleared by   
the   
[place] FDA. When there are no FDA-approved or cleared   
tests available, and other criteria are met, FDA can make tests   
available under an emergency access mechanism called an Emergency   
Use Authorization (EUA). The EUA for this test is supported by   
the  of Health and Human Service's (HHS's) declaration   
that circumstances exist to justify the emergency use of in vitro   
diagnostics for the detection and/or diagnosis of the virus that   
causes COVID-19. This EUA will remain in effect (meaning this   
test can be used) for the duration of the COVID-19 declaration   
justifying emergency of IVDs, unless it is terminated or revoked   
by FDA (after which the test may no longer be used).  
When diagnostic testing is negative, the possibility of a false   
negative should be considered in  
the context of a patient's recent exposures and the presence of   
clinical signs and symptoms  
consistent with SARS-CoV-2.   
   
                                                            Performed By: #### D  
HAILEY, ERUR ####  
[facility]  
[address], [place] [address]  
Dr. Sam Gonzalez   
   
                                                    BASIC METABOLIC PANELon 11   
   
                                                    Anion gap   
[Moles/Vol]     15 mmol/L       Normal          10 - 20         [facility]  
   
                                        Comment on above:   Performed By: #### B  
MP ####  
[facility]  
[id number]  
[place] [address]   
   
                      Calcium [Mass/Vol] 9.2 mg/dL  Normal     8.6 - 10.6 [facility]  
   
                                        Comment on above:   Performed By: #### B  
MP ####  
[facility]  
[id number]  
[place] [address]   
   
                      Chloride [Moles/Vol] 106 mmol/L Normal     98 - 107   [facility]  
   
                                        Comment on above:   Performed By: #### B  
MP ####  
[facility]  
[address]  
[place] [address]   
   
                                                    Creatinine   
[Mass/Vol]      0.85 mg/dL      Normal          0.50 - 1.30     [facility]  
   
                                        Comment on above:   Performed By: #### B  
MP ####  
[facility]  
[address]  
[place] [address]   
   
                                                    GFR/1.73 sq   
M.predicted among   
non-blacks MDRD   
(S/P/Bld) [Vol   
rate/Area]      88 mL/min/{1.73_m2} Normal          >90             [facility]  
   
                                        Comment on above:   Result Comment: CALC  
ULATIONS OF ESTIMATED GFR ARE PERFORMED  
USING THE  CKD-EPI STUDY REFIT EQUATION  
WITHOUT THE RACE VARIABLE FOR THE IDMS-TRACEABLE  
CREATININE METHODS.  
https://jasn.asnjournals.org/content/early//ASN.2971770  
988   
   
                                                            Performed By: #### B  
MP ####  
[facility]  
[address]  
[place] [address]   
   
                      Glucose [Mass/Vol] 80 mg/dL   Normal     74 - 99    [facility]  
   
                                        Comment on above:   Performed By: #### B  
MP ####  
[facility]  
[address]  
[place] [address]   
   
                                                    HCO3 (Bld)   
[Moles/Vol]     24 mmol/L       Normal          21 - 32         [facility]  
   
                                        Comment on above:   Performed By: #### B  
MP ####  
[facility]  
[address]  
[place] [address]   
   
                                                    Potassium   
[Moles/Vol]     4.6 mmol/L      Normal          3.5 - 5.3       [facility]  
   
                                        Comment on above:   Performed By: #### B  
MP ####  
[facility]  
[address]  
[place] [address]   
   
                      Sodium [Moles/Vol] 140 mmol/L Normal     136 - 145  [facility]  
   
                                        Comment on above:   Performed By: #### B  
MP ####  
[facility]  
[address] [place]  
[place] [address]   
   
                                                    Urea nitrogen   
[Mass/Vol]      24 mg/dL        High            6 - 23          [facility]  
   
                                        Comment on above:   Performed By: #### B  
MP ####  
[facility]  
[address]  
[place] [address]   
   
                                                    CBCon 2022   
   
                                                    Erythrocyte   
distribution width   
(RBC) [Ratio]   13.3 %          Normal          11.5 - 14.5     [facility]  
   
                                        Comment on above:   Performed By: #### C  
BC ####  
[facility]  
[address]  
[place] [address]   
   
                                                    Hematocrit (Bld)   
[Volume fraction] 43.9 %          Normal          41.0 - 52.0     [facility]  
   
                                        Comment on above:   Performed By: #### C  
BC ####  
[facility]  
[address]  
[place] [address]   
   
                                                    Hemoglobin (Bld)   
[Mass/Vol]      14.8 g/dL       Normal          13.5 - 17.5     [facility]  
   
                                        Comment on above:   Performed By: #### C  
BC ####  
[facility]  
[address]  
[place] [address]   
   
                                                    MCHC (RBC)   
[Mass/Vol]      33.7 g/dL       Normal          32.0 - 36.0     [facility]  
   
                                        Comment on above:   Performed By: #### C  
BC ####  
[facility]  
[address]  
[place] [address]   
   
                                                    MCV (RBC) [Entitic   
vol]            104 fL          High            80 - 100        [facility]  
   
                                        Comment on above:   Performed By: #### C  
BC ####  
[facility]  
[address]  
[place] [address]   
   
                      NUCLEATED RBC 0.0 /100 WBC Normal     0.0-0.0    [facility]  
   
                                        Comment on above:   Performed By: #### C  
BC ####  
[facility]  
[address]  
[place] [address]   
   
                                                    Platelets (Bld)   
[#/Vol]         168 10*3/uL     Normal          150 - 450       [facility]  
   
                                        Comment on above:   Performed By: #### C  
BC ####  
[facility]  
[address]  
[place] [address]   
   
                      RBC        4.24 x10E12/L Low        4.50 - 5.90 [facility]  
   
                                        Comment on above:   Performed By: #### C  
BC ####  
[facility]  
[address]  
[place] [address]   
   
                      WBC (Bld) [#/Vol] 7.1 10*3/uL Normal     4.4 - 11.3 [facility]  
   
                                        Comment on above:   Performed By: #### C  
BC ####  
[facility]  
[address]  
[place] [address]   
   
                                                    COAGULATION SCREENon   
022   
   
                                                    aPTT Coag (Bld)   
[Time]          33 s            Normal          26 - 39         [facility]  
   
                                        Comment on above:   Result Comment: THE   
APTT IS NO LONGER USED FOR MONITORING  
UNFRACTIONATED HEPARIN THERAPY.  
FOR MONITORING HEPARIN THERAPY,  
USE THE HEPARIN ASSAY.   
   
                                                            Performed By: #### C  
OAGS ####  
[facility]  
[address]  
[place] [address]   
   
                      PT Coag (PPP) [Time] 15.0 s     High       9.8 - 13.4 [facility] [place]  
   
                                        Comment on above:   Performed By: #### C  
OAGS ####  
[facility]  
[address]  
[place] [address]   
   
                      PT, INR    1.3        High       0.9 - 1.1  [facility]  
   
                                        Comment on above:   Performed By: #### C  
OAGS ####  
[facility]  
[address]  
[place] [address]   
   
                                                    DRUG SCREEN,URINEon 20  
22   
   
                      DRUG SCREEN COMMENT Canceled   Normal                [facility]  
   
                                        Comment on above:   Order Comment: TEST   
DRUG SCREEN,URINE WAS CANCELLED,   
2022   
16:13 RBS update..   
   
                                                            Result Comment: Drug  
 screen results are presumptive and should   
not be used to assess  
compliance with prescribed medication. Contact the performing   
[facility]  
laboratory to add-on definitive confirmatory testing if   
clinically  
indicated.  
.  
Toxicology screening results are reported qualitatively. The   
concentration  
must be greater than or equal to the cutoff to be reported as   
positive. The  
concentration at which the screening test can detect an   
individual drug or  
metabolite varies. The absence of expected drug(s) and/or drug   
metabolite(s)  
may indicate non-compliance, inappropriate timing of specimen   
collection  
relative to drug administration, poor drug absorption,   
diluted/adulterated  
urine, or limitations of testing. For medical purposes only; not   
valid for  
forensic use.  
.  
Interpretive questions should be directed to the laboratory   
medical  
directors.   
   
                                                            Performed By: #### D  
RUG3 ####  
[facility]  
[address]  
[place] [address]   
   
                                                    DRUG SCREEN,URINE WITH REFLE  
X TO CONFIRMATIONon 2022   
   
                      AMPHETAMINE SCREEN,U Negative   Normal     NEGATIVE   [facility] [place]  
   
                                        Comment on above:   Result Comment: CUTO  
FF LEVEL: 500 NG/ML  
Cross-reactivity has been reported with high concentrations  
of the following drugs: buproprion, chloroquine, chlorpromazine,  
ephedrine, mephentermine, fenfluramine, phentermine,  
phenylpropanolamine, pseudoephedrine, and propranolol.   
   
                                                            Performed By: #### D  
RUGR ####  
[facility]  
[address]  
[place] [address]   
   
                                                    BARBITURATES   
SCREEN,U        Negative        Normal          NEGATIVE        [facility]  
   
                                        Comment on above:   Result Comment: CUTO  
FF LEVEL: 200 NG/ML   
   
                                                            Performed By: #### D  
RUGR ####  
[facility]  
[address]  
[place] [address]   
   
                                                    BENZODIAZEPINES   
SCREEN,U        Negative        Normal          NEGATIVE        [facility]  
   
                                        Comment on above:   Result Comment: CUTO  
FF LEVEL: 200 NG/ML   
   
                                                            Performed By: #### D  
RUGR ####  
[facility]  
[address]  
[place] [id number]   
   
                                                    CANNABINOIDS   
SCREEN,U        Negative        Normal          NEGATIVE        [facility]  
   
                                        Comment on above:   Result Comment: CUTO  
FF LEVEL: 50 NG/ML   
   
                                                            Performed By: #### D  
RUGR ####  
[facility]  
[address]  
[place] [address]   
   
                                                    COCAINE METABOLITE   
SCREEN,U        Negative        Normal          NEGATIVE        [facility]  
   
                                        Comment on above:   Result Comment: CUTO  
FF LEVEL: 150 NG/ML   
   
                                                            Performed By: #### D  
RUGR ####  
[facility]  
[address]  
[place] [address]   
   
                      DRUG SCREEN COMMENT SEE BELOW  Normal                [facility]  
   
                                        Comment on above:   Result Comment: Drug  
 screen results are presumptive and should  
   
not be used to assess  
compliance with prescribed medication. Definitive confirmatory   
drug testing  
has been added to this sample for any positive screen result and   
will be  
reported separately.  
.  
Toxicology screening results are reported qualitatively. The   
concentration  
must be greater than or equal to the cutoff to be reported as   
positive. The  
concentration at which the screening test can detect an   
individual drug or  
metabolite varies. The absence of expected drug(s) and/or drug   
metabolite(s)  
may indicate non-compliance, inappropriate timing of specimen   
collection  
relative to drug administration, poor drug absorption,   
diluted/adulterated  
urine, or limitations of testing. For medical purposes only; not   
valid for  
forensic use.  
.  
Interpretive questions should be directed to the laboratory   
medical  
directors.   
   
                                                            Performed By: #### D  
RUGR ####  
[facility]  
[address]  
[place] [address]   
   
                                                    FENTANYL   
SCREEN,URINE    Negative        Normal          NEGATIVE        [facility]  
   
                                        Comment on above:   Result Comment: CUTO  
FF LEVEL: 5 NG/ML   
   
                                                            Performed By: #### D  
RUGR ####  
[facility]  
[address]  
[place] [address]   
   
                      METHADONE SCREEN,U Negative   Normal     NEGATIVE   [facility]  
   
                                        Comment on above:   Result Comment: CUTO  
FF LEVEL: 150 NG/ML  
The metabolite L-alpha-acetylmethadol (LAAM) is not  
detected by this method in concentrations that would  
be found in the urine of patients on LAAM therapy.   
   
                                                            Performed By: #### D  
RUGR ####  
[facility]  
[address]  
[place] [id number]   
   
                      OPIATES SCREEN,U Negative   Normal     NEGATIVE   [facility]  
   
                                        Comment on above:   Result Comment: CUTO  
FF LEVEL: 300 NG/ML  
The opiate screen does not detect fentanyl, meperidine, or  
tramadol. Oxycodone is not consistently detected (refer to  
Oxycodone Screen, Urine result).   
   
                                                            Performed By: #### D  
RUGR ####  
[facility]  
[address]  
[place] [id number]   
   
                      OXYCODONE SCREEN,U Negative   Normal     NEGATIVE   [facility]  
   
                                        Comment on above:   Result Comment: CUTO  
FF LEVEL: 100 NG/ML  
This test will accurately detect both oxycodone and oxymorphone.   
   
                                                            Performed By: #### D  
RUGR ####  
[facility]  
[address]  
[place] [id number]   
   
                      PCP SCREEN,U Negative   Normal     NEGATIVE   [facility]  
   
                                        Comment on above:   Result Comment: CUTO  
FF LEVEL: 25 NG/ML  
Cross-reactivity has been reported with dextromethorphan.   
   
                                                            Performed By: #### D  
RUGR ####  
[facility]  
[address]  
[place] [address]   
   
                                                    Laboratory - Chemistry and C  
hemistry - challengeon 2022   
   
                                                    Anion gap   
[Moles/Vol]     15 mmol/L                       10 - 20         [facility]   
[facility]  
Work Phone:   
[phone]  
   
                      Calcium [Mass/Vol] 9.2 mg/dL             8.6 - 10.6 [facility]   
[facility]  
Work Phone:   
[phone]  
   
                      Chloride [Moles/Vol] 106 mmol/L            98 - 107   [facility]   
[facility]  
Work Phone:   
[phone]  
   
                      CO2 [Moles/Vol] 24 mmol/L             21 - 32    [facility]   
[facility]  
Work Phone:   
[phone]  
   
                                                    Creatinine   
[Mass/Vol]      0.85 mg/dL                      See Below       [facility]   
[facility]  
Work Phone:   
[phone]  
   
                                        Comment on above:   Reference Range: 0.5  
0 - 1.30   
   
                      Glucose [Mass/Vol] 80 mg/dL              74 - 99    [facility]   
[facility]  
Work Phone:   
[phone]  
   
                                                    Potassium   
[Moles/Vol]     4.6 mmol/L                      3.5 - 5.3       [facility]   
[facility]  
Work Phone:   
[phone]  
   
                      Sodium [Moles/Vol] 140 mmol/L            136 - 145  [facility]   
Med  
Work Phone:   
[phone]  
   
                                                    Urea nitrogen   
[Mass/Vol]                24 mg/dL                  above high   
threshold                 6 - 23                    [facility]   
[facility]  
Work Phone:   
[phone]  
   
                                                    Laboratory - Coagulationon 1  
2022   
   
                                                    aPTT Coag (PPP)   
[Time]          33 s                            26 - 39         [facility]  
Work Phone:   
[phone]  
   
                                        Comment on above:   THE APTT IS NO LONGE  
R USED FOR MONITORING UNFRACTIONATED   
HEPARIN   
THERAPY. FOR MONITORING HEPARIN THERAPY, USE THE HEPARIN ASSAY.   
   
                                                    INR Coag (PPP)   
[Relative time]           1.3 {INR}                 above high   
threshold                 0.9 - 1.1                 [facility]  
Work Phone:   
[phone]  
   
                                PT Coag (PPP) [Time] 15.0 s          above high   
threshold                 9.8 - 13.4                [facility]   
[facility]  
Work Phone:   
[phone]  
   
                                                    Laboratory - Hematology and   
Cell countson 2022   
   
                                                    Erythrocyte   
distribution width   
(RBC) [Ratio]   13.3 %                          See Below       [facility]   
[facility]  
Work Phone:   
[phone]  
   
                                        Comment on above:   Reference Range: 11.  
5 - 14.5   
   
                                                    Hematocrit (Bld)   
[Volume fraction] 43.9 %                          See Below       [facility]   
[facility]  
Work Phone:   
[phone]  
   
                                        Comment on above:   Reference Range: 41.  
0 - 52.0   
   
                                                    Hemoglobin (Bld)   
[Mass/Vol]      14.8 g/dL                       See Below       [facility]   
[facility]  
Work Phone:   
[phone]  
   
                                        Comment on above:   Reference Range: 13.  
5 - 17.5   
   
                                                    MCHC (RBC)   
[Mass/Vol]      33.7 g/dL                       See Below       [facility]   
[facility]  
Work Phone:   
[phone]  
   
                                        Comment on above:   Reference Range: 32.  
0 - 36.0   
   
                                                    MCV (RBC) [Entitic   
vol]                      104 fL                    above high   
threshold                 80 - 100                  [facility]  
Work Phone:   
[phone]  
   
                                                    Platelets (Bld)   
[#/Vol]         168 10*3/uL                     150 - 450       [facility]   
[facility]  
Work Phone:   
[phone]  
   
                                RBC (Bld) [#/Vol] 4.24 {x10E12/L} below low   
threshold                 See Below                 [facility]   
[facility]  
Work Phone:   
[phone]  
   
                                        Comment on above:   Reference Range: 4.5  
0 - 5.90   
   
                      WBC (Bld) [#/Vol] 7.1 10*3/uL            4.4 - 11.3 [facility]   
[facility]  
Work Phone:   
[phone]  
   
                                                    No Panel Informationon    
   
                                 88 {mL/min/1.73m2}            >90        [facility]   
[facility]  
Work Phone:   
[phone]  
   
                                        Comment on above:   CALCULATIONS OF PASCUAL  
MATED GFR ARE PERFORMED USING THE    
CKD-EPI STUDY REFIT EQUATION WITHOUT THE RACE VARIABLE FOR THE   
IDMS-TRACEABLE CREATININE   
METHODS.https://jasn.asnjournals.org/content/early//ASN  
.7368453601   
   
                                 0.0 {/100_WBC}            0.0-0.0    [facility]   
[facility]  
Work Phone:   
[phone]  
   
                                 Canceled                         [facility]   
[facility]  
Work Phone:   
[phone]  
   
                                        Comment on above:   Drug screen results   
are presumptive and should not be used to   
assess compliance with prescribed medication. Contact the   
performing [facility] laboratory to add-on definitive confirmatory   
testing if clinically indicated. .Toxicology screening results   
are reported qualitatively. The concentration must be greater   
than or equal to the cutoff to be reported as positive. The   
concentration at which the screening test can detect an   
individual drug or metabolite varies. The absence of expected   
drug(s) and/or drug metabolite(s) may indicate non-compliance,   
inappropriate timing of specimen collection relative to drug   
administration, poor drug absorption, diluted/adulterated urine,   
or limitations of testing. For medical purposes only; not valid   
for forensic use. .Interpretive questions should be directed to   
the laboratory medical directors.   
   
                                                    STAPH/MRSA SCREENon 20   
   
                                        STAPH/MRSA SCREEN   PATIENT: ENMANUEL ARRINGTON  
   
MRN: [id number] LOCATION:   
JANET SHETTY#: [id number] :   
[birth year] AGE: SEX: M  
  
ORDER#: [id number]   
ORDERED BY: DONNA HERNANDES  
SOURCE: ANTERIOR NARES   
COLLECTED: 22   
16:12  
ANTIBIOTICS AT NEILA.:   
RECEIVED : 22   
17:44  
SITE: Nasal  
  
R E S U L T S  
  
STAPH/MRSA SCREEN FINAL   
22 09:40  
  
NO Staphylococcus aureus   
ISOLATED.           Normal                                  [facility]  
   
                                        Comment on above:   Performed By: #### S  
TAPH ####  
[facility]  
[address]  
[place] [address]   
   
                                                    Blood Pressure Cuff Sizeon 0  
2022   
   
                                                    Adult depression   
screening assessment No                                              [facility]  
Work Phone:   
[phone]  
   
                                        Fall risk assessment a) No falls within   
the   
last year                                                   [facility]  
[place]  
Work Phone:   
[phone]  
   
                                                    Tobacco use status   
[facility]            a) Yes                                          [facility]  
Work Phone:   
[phone]  
   
                                                    Blood Pressure Cuff   
Size            Large                                           [facility]  
Work Phone:   
[phone]  
   
                                                    Office Visit (Cardiology)on   
2022   
   
                                        Follow-up visit     Diagnoses/Problems  
Assessed  
Atrial fibrillation   
(427.31) (I48.91)  
Nonischemic   
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   
(I42.8)  
Chief Complaint  
ENMANUEL ARRINGTON is being seen   
for a cardiovascular   
evaluation of atrial   
fibrillation.  
  
Adult Risk Screening  
There are no   
spiritual/cultural   
practices/values/needs   
that are important to   
know  
Initial Fall Risk   
Screening:  
ENMANUEL has not fallen in   
the last 6 months. His   
fall did not result in   
injury. ENMANUEL does not   
have a fear of falling.   
He does not need   
assistance with sitting,   
standing or walking.   
Does not need assistance   
walking in his home. He   
does not need assistance   
in an unfamiliar   
setting. The patient is   
not using an assistive   
device.  
Pain Scale: On a scale   
of 0 to 10, the patient   
rates the pain at 0.  
  
Living Will.  
Living Will: No living   
will on file.  
Healthcare POA: No   
healthcare proxy on   
file.  
Declaration of Mental   
Health Treatment: No   
mental health treatment   
on file.  
Tobacco Screening: ENMANUEL   
does not use tobacco.  
Domestic Violence   
Screen: Does not feel   
threatened or abused   
physically, emotionally   
or sexually. Do you feel   
UNSAFE?  
The patient feels safe   
in the home.  
Depression/Suicide   
Screening:  
He does not have a risk   
of suicide.  
He has not had thoughts   
of harming others.  
Nutrition Screening:  
In the past month, there   
was not a day when I or   
anyone in my family went   
hungry because there was   
not enough food.  
  
History of Present   
Illness  
Enmanuel Arrington is a 75 y/o   
male referred by Dr Murphy for evaluation   
on AF.  
PMH includes NICM, R   
should rotator cuff   
injury, cocaine abuse x   
17 yrs (rehab in [place] now   
sober), oxycodone abuse   
(started in ) w/   
accidental overdose and   
AF.  
Treatment of his AF   
includes carvedilol. Pt   
has not required DCCV or   
AAD?s.  
Symptoms of his AF   
include palpitations and   
DELEON.  
Pt recently followed up   
with his PCP in the end   
of April/early May and   
pt was noted to be in   
AF. Pt had a history of   
irregular heartbeat but   
no diagnosis of AF. Pt   
was started on Eliquis.  
Pt established with Dr Murphy with the plan   
for DCCV. His DCCV was   
cancelled because pt had   
COVID. Pt is not on ay   
medications for rate   
control. Pt has stopped   
all his medications   
almost 1 week ago   
including his Eliquis.   
pt has a history of   
Oxycodone abuse which he   
was started on initially   
in  for a rotator   
cuff injury. Pt has been   
obtaining the   
medications on the   
street not through   
prescription. Pt has a   
recent accidental   
overdose which required   
ED visit an reversal   
with Narcan. Pt was take   
to rehab by his daughter   
about 1 week ago but   
left the same day.  
Pt reports that he has   
been sober for 6 days.   
Pt presents today to   
discuss AF ablation.  
Echo 2022: EF 40-45%,   
septal akinesis, LA   
mildly dilated  
Stress test 2022: no   
ischemia or myocardial   
infarction  
  
Active Problems  
Problems  
Atrial fibrillation   
(427.31) (I48.91)  
Dyspnea on exertion   
(786.09) (R06.09)  
Former smoker (V15.82)   
(Z87.891)  
quit somking at age 34  
Nonischemic   
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   
(I42.8)  
Overweight with body   
mass index (BMI) of 25   
to 25.9 in adult   
(278.02,V85.21)  
(E66.3,Z68.25)  
Surgical History  
Problems  
History of Middle ear   
surgery  
History of Oral surgery  
History of Tonsillectomy  
Current Meds  
  
Medication   
NameInstruction  
Carvedilol 6.25 MG Oral   
TabletTAKE 0.5 TABLET   
Twice daily  
Eliquis 5 MG Oral   
TabletTake 1 tablet   
twice daily  
Eplerenone 25 MG Oral   
TabletTAKE 1 TABLET   
DAILY.  
Losartan Potassium 25 MG   
Oral TabletTAKE 1 TABLET   
DAILY AS DIRECTED.  
Protonix 40 MG Oral   
Tablet Delayed   
ReleaseTAKE 1 TABLET   
DAILY.  
Allergies  
Medication  
No Known Drug Allergies  
Recorded By: Almita Alford; 2022   
10:01:43 AM  
Family History  
Mother  
Family history of lung   
cancer (V16.1) (Z80.1)  
Father  
Family history of   
cerebrovascular accident   
(CVA) (V17.1) (Z82.3)  
FH: CABG (coronary   
artery bypass surgery)   
(V17.3) (Z82.49)  
Sibling  
Family history of lung   
cancer (V16.1) (Z80.1)  
Social History  
Problems  
Consumes alcohol   
occasionally (V49.89)   
(Z78.9)  
Daily caffeine   
consumption  
2 cups of coffee daily,   
pop three times weekly  
Former smoker (V15.82)   
(Z87.891)  
quit somking at age 34  
Illicit drug use   
(305.90) (F19.90)  
oxycodone  
Review of Systems  
Constitutional: not   
feeling tired and not   
feeling poorly.  
Eyes: no eyesight   
problems.  
ENT: no hearing loss and   
no nosebleeds.  
Cardiovascular: no   
intermittent leg   
claudication, no chest   
pain, no tightness or   
heavy pressure, no   
shortness of breath, no   
palpitations and as   
noted in HPI.  
Respiratory: no chronic   
cough, no shortness of   
breath, no shortness of   
breath during exertion   
and no orthopnea.  
Gastrointestinal: no   
change in bowel habits   
and no blood in stools.  
Genitourinary: no   
urinary frequency and no   
hematuria.  
Skin: no skin rashes.  
Neurological: no   
seizures, no frequent   
falls, no dizziness and   
no fainting.  
Psychiatric: no   
depression and not   
suicidal.  
Endocrine: no thyroid   
disorder and no diabetes   
mellitus.  
All other systems have   
been reviewed and are   
negative for com (more   
content not included)... Normal                                   [employer]  
   
                                                    DRUG SCREEN RAPID (URINE)on   
2022   
   
                      AMP        Negative   Normal     NEGATIVE   [facility]  
   
                                        Comment on above:   Performed By: #### D  
HAILEY ERUR ####  
[facility]  
[address] [place] [address]  
Dr. Sam Gonzalez   
   
                      BAR        Negative   Normal     NEGATIVE   [facility]  
   
                                        Comment on above:   Performed By: #### D  
RUGRPD, ERUR ####  
[facility]  
[address] [place] [address]  
Dr. Sam Gonzalez   
   
                      BUP        Negative   Normal     NEGATIVE   [facility]  
   
                                        Comment on above:   Performed By: #### D  
RUGRPD, ERUR ####  
[facility]  
[address] [place] [address]  
Dr. Sam Gonzalez   
   
                      BZO        Negative   Normal     NEGATIVE   [facility]  
   
                                        Comment on above:   Performed By: #### D  
RUGRPD, ERUR ####  
[facility]  
[address] [place] [address]  
Dr. Sam Gonzalez   
   
                      OLIVIER        Negative   Normal     NEGATIVE   [facility]  
   
                                        Comment on above:   Performed By: #### D  
DOMINIKD, ERUR ####  
[facility]  
[address] [place] [address]  
Dr. Sam Gonzalez   
   
                      CUT-OFFS   SEE BELOW  Normal                [facility]  
   
                                        Comment on above:   Result Comment: AMP   
(Amphetamine): 500ng/mL, BAR   
(Barbituates):   
200 ng/mL, BZO (Benzodiazepines): 150 ng/mL, BUP (Buprenorphine):   
10 ng/mL, OLIVIER (Cocaine): 150 ng/mL, mAMP (Methamphetamine): 500   
ng/mL, MTD (Methadone): 200 ng/mL, OPI (Opiates): 100 ng/mL, OXY   
(Oxycodone): 100 ng/mL, PCP (Phencyclidine): 25 ng/mL, PPX   
(Propoxyphene): 300 ng/mL, THC (Cannabinoids): 50 ng/mL, TCA   
(Trycyclic Antidepressants): 300 ng/mL   
   
                                                            Performed By: #### D  
DOMINIKD, ERUR ####  
[facility]  
[address] [place] [address]  
Dr. Sam Gonzalez   
   
                                        DRUG CUT HEADER     DRUG CLASS TEST SYST  
EM   
CUT-OFF CONCENTRATIONS   
ARE AS FOLLOWS:     Normal                                  [facility]  
   
                                        Comment on above:   Performed By: #### D  
RUGRPD, ERUR ####  
[facility]  
[address] [place], [place] [id number]  
Dr. Sam Gonzalez   
   
                      mAMP       Negative   Normal     NEGATIVE   [facility]  
   
                                        Comment on above:   Performed By: #### D  
RUGRPD, ERUR ####  
[facility]  
[address] [address]  
Dr. Sam Gonzalez   
   
                      MTD        Negative   Normal     NEGATIVE   [facility]  
   
                                        Comment on above:   Performed By: #### D  
RUGRPD, ERUR ####  
[facility]  
[address] [address]  
Dr. Sam Gonzalez   
   
                      OPI        Negative   Normal     NEGATIVE   [facility]  
   
                                        Comment on above:   Performed By: #### D  
RUGRPD, ERUR ####  
[facility]  
[address] [address]  
Dr. Sam Gonzalez   
   
                      OXY        Positive   Abnormal   NEGATIVE   [facility]  
   
                                        Comment on above:   Performed By: #### D  
RUGRPD, ERUR ####  
[facility]  
[address] [place] [address]  
Dr. Sam Gonzalez   
   
                      PCP        Negative   Normal     NEGATIVE   [facility]  
   
                                        Comment on above:   Performed By: #### D  
RUGRPD, ERUR ####  
[facility]  
[address] [place] [address]  
Dr. Sam Gonzalez   
   
                      PPX        Negative   Normal     NEGATIVE   [facility]  
   
                                        Comment on above:   Performed By: #### D  
RUGRPD, ERUR ####  
[facility]  
[address] [place] [address]  
Dr. Sam Gonzalez   
   
                      TCA        Negative   Normal     NEGATIVE   [facility]  
   
                                        Comment on above:   Performed By: #### D  
RUGRPD, ERUR ####  
[facility]  
[address] [place] [address]  
Dr. Sam Gonzalez   
   
                      THC        Negative   Normal     NEGATIVE   [facility]  
   
                                        Comment on above:   Performed By: #### D  
RUGRPD, ERUR ####  
[facility]  
[address] [place] [address]  
Dr. Sam Gonzalez   
   
                                                    ER URINE PROFILEon   
2   
   
                      Bilirubin Ql (U) Negative   Normal     NEGATIVE   [facility]  
   
                                        Comment on above:   Performed By: #### D  
RUGRPD, ERUR ####  
[facility]  
[address] [place] [address]  
Dr. Sam Gonzalez   
   
                      Clarity (U) CLEAR      Normal     CLEAR      [facility]  
   
                                        Comment on above:   Performed By: #### D  
HAILEY, ERUR ####  
[facility]  
[address] [address]  
Dr. Sam Gonzalez   
   
                      Color (U)  YELLOW     Normal     YELLOW     [facility]  
   
                                        Comment on above:   Performed By: #### D  
HAILYE, ERUR ####  
[facility]  
[address] [address]  
Dr. Sam BUTTERFIELDAHBRE              A micrscopic examina  
tion   
will be performed if   
indicated.          Normal                                  [facility]  
   
                                        Comment on above:   Performed By: #### D  
HAILEY, ERUR ####  
[facility]  
[address] [address]  
Dr. Sam Gonzalez   
   
                      Glucose Ql (U) Negative   Normal     NEGATIVE   [facility]  
   
                                        Comment on above:   Performed By: #### D  
HAILEY, ERUR ####  
[facility]  
[address] [place] [address]  
Dr. Sam Gonzalez   
   
                      Hemoglobin Ql (U) Negative   Normal     NEGATIVE   [facility]  
   
                                        Comment on above:   Performed By: #### D  
HAILEY, ERUR ####  
[facility]  
[address] [place] [address]  
Dr. Sam Gonzalez   
   
                      Ketones Ql (U) Negative   Normal     NEGATIVE   [facility]  
   
                                        Comment on above:   Performed By: #### D  
HAILEY, ERUR ####  
[facility]  
[address] [place] [address]  
Dr. Sam Gonzalez   
   
                      LEUKOCYTES Negative   Normal     NEGATIVE   [facility]  
   
                                        Comment on above:   Performed By: #### D  
HAILEY, ERUR ####  
[facility]  
[address] [place] [address]  
Dr. Sam Gonzalez   
   
                      Nitrite Ql (U) Negative   Normal     NEGATIVE   [facility]  
   
                                        Comment on above:   Performed By: #### BRE HART, ERUR ####  
[facility]  
[address] [address]  
Dr. Sam Gonzalez   
   
                      pH (U)     5.5 [pH]   Normal     5-9        [facility]  
   
                                        Comment on above:   Performed By: #### D  
HAILEY, ERUR ####  
[facility]  
[address] [place] [address]  
Dr. Sam Gonzalez   
   
                          SPEC GRAVITY 1.025        Normal       1.005-<=1.0  
25                                      [facility]  
   
                                        Comment on above:   Performed By: #### D  
HAILEY, ERUR ####  
[facility]  
[address] [place] [address]  
Dr. Sam Gonzalez   
   
                          UA PROTEIN   Negative     Normal       NEGATIVE/   
TRACE                                   [facility]  
   
                                        Comment on above:   Performed By: #### D  
HAILEY, ERUR ####  
[facility]  
[address] [place] [address]  
Dr. Sam Gonzalez   
   
                      UR MICRO IND NOT INDICATED Normal                [facility]  
   
                                        Comment on above:   Performed By: #### D  
HAILEY, ERUR ####  
[facility]  
[address] [place] [address]  
Dr. Sam Gonzalez   
   
                      Urobilinogen Qn (U) 0.2 {Frederic'U}/dL Normal     0.2 - 1.  
0  [facility]  
   
                                        Comment on above:   Performed By: #### D  
HAILEY, ERUR ####  
[facility]  
[address] [place] [address]  
Dr. Sam Gonzalez   
   
                                                    XR CHEST 1 Von 2022   
   
                                        XR CHEST 1 V        CLINICAL HISTORY:   
Altered mental status.  
FINDINGS: Portable AP   
view of the chest   
obtained.   
Cardiomediastinal   
silhouette  
is normal. Lungs are   
clear, no evidence of   
infiltrate, suspicious   
nodule, or  
mass. No evidence of   
significant pleural   
fluid on this portable   
projection. No  
acute bony abnormality.  
IMPRESSION:  
No acute abnormality.  
Electronically   
authenticated by:   
SURENDRA MANCERA Date:   
2022 22:07    Normal                                  [facility]  
   
                                                    ACETONE SERUMon 2022   
   
                      ACETONE    Negative   Normal     NEGATIVE   [facility]  
   
                                        Comment on above:   Performed By: #### D  
HAILEY, ERUR ####  
[facility]  
[address] [place] [address]  
Dr. Sam Gonzalez   
   
                                                    AMMONIAon 2022   
   
                                                    Ammonia (P)   
[Moles/Vol]     46 umol/L       Critically high 11-32           [facility]  
   
                                        Comment on above:   Performed By: #### D  
HAILEY, ERUR ####  
[facility]  
[address] [place] [address]  
Dr. Sam Gonzalez   
   
                                                    CBC AUTO DIFFon 2022   
   
                      BASO #     0.1 103/ul Normal     0.0-0.1    [facility]  
   
                                        Comment on above:   Performed By: #### D  
HAILEY, ERUR ####  
[facility]  
[address] [place] [address]  
Dr. Sam Gonzalez   
   
                                                    Basophils/100 WBC   
(Bld)           1.0 %           Normal          0.2-2.0         [facility]  
   
                                        Comment on above:   Performed By: #### D  
HAILEY, ERUR ####  
[facility]  
[address] [place] [address]  
Dr. Sam Gonzalez   
   
                      EO #       0.6 103/ul Normal     0.0-0.7    [facility]  
   
                                        Comment on above:   Performed By: #### D  
HAILEY, ERUR ####  
[facility]  
[address] [place] [address]  
Dr. Sam Gonzalez   
   
                                                    Eosinophils/100 WBC   
(Bld)           9.4 %           Critically high 0.9-7.0         [facility]  
   
                                        Comment on above:   Performed By: #### D  
HAILEY, ERUR ####  
[facility]  
[address] [place] [address]  
Dr. Sam Gonzalez   
   
                                                    Erythrocyte   
distribution width   
(RBC) [Ratio]   13.4 %          Normal          11.0-15.0       [facility]  
   
                                        Comment on above:   Performed By: #### BRE HART, ERUR ####  
[facility]  
[address] [place] [address]  
Dr. Sam Gonzalez   
   
                                                    Hematocrit (Bld)   
[Volume fraction] 38.0 %          Critically low  42.0-54.0       [facility]  
   
                                        Comment on above:   Performed By: #### D  
HAILEY, ERUR ####  
[facility]  
[address] [place] [address]  
Dr. Sam Gonzalez   
   
                                                    Hemoglobin (Bld)   
[Mass/Vol]      12.8 g/dL       Critically low  14.0-18.0       [facility]  
   
                                        Comment on above:   Performed By: #### D  
HAILEY, ERUR ####  
[facility]  
[address] [place] [address]  
Dr. Sam Gonzalez   
   
                      IG #       0.01 10e3/ul Normal     0.00-0.03  [facility]  
   
                                        Comment on above:   Performed By: #### D  
HAILEY, ERUR ####  
[facility]  
[address] [place] [address]  
Dr. Sam Gonzalez   
   
                      IG %       0.1 %      Normal     0.0-0.5    [facility]  
   
                                        Comment on above:   Performed By: #### D  
HAILEY, ERUR ####  
[facility]  
[address] [place] [address]  
Dr. Sam Gonzalez   
   
                      LYMPH #    2.2 103/ul Normal     1.2-3.8    [facility]  
   
                                        Comment on above:   Performed By: #### D  
HAILEY, ERUR ####  
[facility]  
[address] [place] [address]  
Dr. Sam Gonzalez   
   
                                                    Lymphocytes/100 WBC   
(Bld)           32.1 %          Normal          20.5-60.0       [facility]  
   
                                        Comment on above:   Performed By: #### D  
HAILEY, ERUR ####  
[facility]  
[address] [place] [address]  
Dr. Sam Gonzalez   
   
                      MANUAL DIFF REQ NO         Normal                [facility]  
   
                                        Comment on above:   Performed By: #### D  
HAILEY ERUR ####  
[facility]  
[address] [place] [address]  
Dr. Sam Gonzalez   
   
                                                    MCH (RBC) [Entitic   
mass]           33.8 pg         Normal          25.9-34.0       [facility]  
   
                                        Comment on above:   Performed By: #### D  
HAILEY, ERUR ####  
[facility]  
[address] [place] [address]  
Dr. Sam Gonzalez   
   
                                                    MCHC (RBC)   
[Mass/Vol]      33.7 g/dL       Normal          29.9-35.2       [facility]  
   
                                        Comment on above:   Performed By: #### D  
HAILEY, ERUR ####  
[facility]  
[address] [place] [address]  
Dr. Sam Gonzalez   
   
                                                    MCV (RBC) [Entitic   
vol]            100.3 fL        Critically high 80.0-94.0       [facility]  
   
                                        Comment on above:   Performed By: #### BRE HART, ERUR ####  
[facility]  
[address] [address]  
Dr. Sam Gonzalez   
   
                      MONO #     0.8 103/ul Normal     0.3-0.8    [facility]  
   
                                        Comment on above:   Performed By: #### D  
HAILEY, ERUR ####  
[facility]  
[address] [address]  
Dr. Sam Gonzalez   
   
                                                    Monocytes/100 WBC   
(Bld)           11.9 %          Normal          1.7-12.0        [facility]  
   
                                        Comment on above:   Performed By: #### D  
HAILEY, ERUR ####  
[facility]  
[address] [address]  
Dr. Sam Gonzalez   
   
                      NEUT #     3.1 103/ul Normal     1.4-6.5    [facility]  
   
                                        Comment on above:   Performed By: #### D  
HAILEY, ERUR ####  
[facility]  
[address] [place] [address]  
Dr. Sam Gonzalez   
   
                                                    Neutrophils/100 WBC   
(Bld)           45.5 %          Normal          43.0-75.0       [facility]  
   
                                        Comment on above:   Performed By: #### D  
HAILEY, ERUR ####  
[facility]  
[address] [place] [address]  
Dr. Sam Gonzalez   
   
                                                    Platelet mean volume   
(Bld) [Entitic vol] 9.4 fL          Critically low  9.5-13.5        [facility]  
   
                                        Comment on above:   Performed By: #### D  
HAILEY, ERUR ####  
[facility]  
[address] [place] [address]  
Dr. Sam Gonzalez   
   
                      PLT        136 103/ul Critically low 150-450    [facility]  
   
                                        Comment on above:   Performed By: #### D  
HAILEY, ERUR ####  
[facility]  
[address] [place] [address]  
Dr. Sam Gonzalez   
   
                      RBC        3.79 106/ul Critically low 4.70-6.10  [facility]  
   
                                        Comment on above:   Performed By: #### D  
HAILEY, ERUR ####  
[facility]  
[address] [place] [address]  
Dr. Sam Gonzalez   
   
                      WBC        6.8 103/ul Normal     4.0-11.0   [facility]  
   
                                        Comment on above:   Performed By: #### D  
HAILEY, ERUR ####  
[facility]  
[address] [address]  
Dr. Sam Gonzalez   
   
                                                    CT HEAD WO CONon 2022   
   
                                        CT HEAD WO CON      EXAMINATION: CT HEAD  
 WO   
CON  
HISTORY: Altered mental   
status.  
TECHNIQUE: Axial CT   
scans through the head   
were obtained without IV   
contrast  
administration. Dose   
reduction techniques   
were achieved by using:   
automated  
exposure control and/or   
adjustment of mA and/or   
kV according to patient   
size  
and/or use of iterative   
reconstruction   
technique.  
COMPARISON: None.  
FINDINGS: Mild to   
moderate periventricular   
low attenuation in the   
cerebral  
hemispheres without   
associated mass effect.   
The brainstem and the   
cerebellum  
appear normal. The   
ventricular system and   
cortical sulci are   
prominent,  
secondary to cerebral   
volume loss. No area of   
abnormal mass effect,   
edema, or  
intracranial hemorrhage   
is shown. The visualized   
orbits appear normal.   
The  
visualized paranasal   
sinuses show no   
air-fluid level.   
Congenital wall down   
left  
mastoidectomy.  
IMPRESSION:  
1. 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   
microvascular ischemic   
change and age-related   
cerebral  
atrophy.  
2. No acute intracranial   
process.  
Electronically   
authenticated by: SOCRATES RICHARDS Date: 2022   
21:44               Normal                                  [facility]  
   
                                                    Covid-19 PCR (CVDTB)on 07-3  
0-2022   
   
                                                    SARS-CoV-2   
(COVID-19) RNA   
ARMANDO+probe Ql (Unsp   
spec)               Not detected        Normal              NOT   
DETECTED                                [facility]  
   
                                        Comment on above:   Result Comment: When  
 diagnostic testing is negative, the   
possibility of a false negative should be considered in  
the context of a patient's recent exposures and the presence of   
clinical signs and symptoms  
consistent with SARS-CoV-2.  
This test is not yet approved or cleared by the [place]   
FDA. When there are no FDA-approved or cleared tests available,   
and other criteria are met, FDA can make tests available under an   
emergency access mechanism called an Emergency Use Authorization   
(EUA). The EUA for this test is supported by the [place] of   
Health and Human Service's declaration that circumstances exist   
to justify the emergency use of in vitro diagnostics for the   
detection and/or diagnosis of the virus that causes COVID-19.   
This EUA will remain in effect for the duration of the COVID-19   
declaration justifying emergency of IVDs, unless it is terminated   
or revoked by the FDA (after which the test may no longer be   
used).   
   
                                                            Performed By: #### D  
HAILEY, ERUR ####  
[facility]  
[address] [place] [address]  
Dr. Sam Gonzalez   
   
                                                    ETHANOL (BLD ALC)on 20  
22   
   
                                        ALC NOTE            NOTE: 80 mg/dl is th  
e   
legal limit for a blood   
alcohol level       Normal                                  [facility]  
   
                                        Comment on above:   Performed By: #### D  
HAILEY, ERUR ####  
[facility]  
[address] [place] [address]  
Dr. Sam Gonzalez   
   
                      Ethanol [Mass/Vol] mg/dL      Normal                [facility]  
   
                                        Comment on above:   Performed By: #### D  
HAILEY, ERUR ####  
[facility]  
[address] [place] [address]  
Dr. Sam Gonzalez   
   
                                                    FREE T3on 2022   
   
                      FREE T3    3.08 pg/mlL Normal     2.18-3.98  [facility]  
   
                                        Comment on above:   Performed By: #### F  
T3 ####  
[facility]  
[address] [place] [address]  
Dr. Sam Gonzalez   
   
                                                    FREE T4on 2022   
   
                      Free T4 [Mass/Vol] 1.11 ng/dL Normal     0.76-1.46  [facility]  
   
                                        Comment on above:   Performed By: #### D  
HAILEY, ERUR ####  
[facility]  
[address] [place] [address]  
Dr. Sam Gonzalez   
   
                                                    LACTATE/LACTIC ACIDon 2022   
   
                      Lactate [Moles/Vol] 0.8 mmol/L Normal     0.4-1.9    [facility]  
   
                                        Comment on above:   Performed By: #### D  
HAILEY, ERUR ####  
[facility]  
[address] [place] [address]  
Dr. Sam Gonzalez   
   
                                                    PH VENOUS BLOODon 2022  
   
   
                      PCO2 VENOUS 48.5 mmHg  Normal     40.0-52.0  [facility]  
   
                                        Comment on above:   Performed By: #### D  
HAILEY, ERUR ####  
[facility]  
[address] [place] [address]  
Dr. Sam Gonzalez   
   
                      pH VENOUS  7.347      Normal     7.330-7.430 [facility]  
   
                                        Comment on above:   Performed By: #### D  
HAILEY, ERUR ####  
[facility]  
[address] [address]  
Dr. Sam Gonzalez   
   
                                                    PROF 14(COMP METB)on   
022   
   
                      Albumin [Mass/Vol] 3.7 g/dL   Normal     3.4-5.0    [facility]  
   
                                        Comment on above:   Performed By: #### D  
HAILEY, ERUR ####  
[facility]  
[address] [address]  
Dr. Sam Gonzalez   
   
                                                    Albumin/Globulin   
[Mass ratio]    1.2 {ratio}     Normal                          [facility]  
   
                                        Comment on above:   Performed By: #### D  
HAILEY, ERUR ####  
[facility]  
[address] [place] [address]  
Dr. Sam Gonzalez   
   
                                                    ALP [Catalytic   
activity/Vol]   78 U/L          Normal                    [facility]  
   
                                        Comment on above:   Performed By: #### BRE HART, ERUR ####  
[facility]  
[address] [place] [address]  
Dr. Sam Gonzalez   
   
                                                    ALT [Catalytic   
activity/Vol]   26 U/L          Normal          16-63           [facility]  
   
                                        Comment on above:   Performed By: #### D  
HAILEY, ERUR ####  
[facility]  
[address] [place] [address]  
Dr. Sam Gonzalez   
   
                                                    Anion gap   
[Moles/Vol]     11.1 mmol/L     Normal                          [facility]  
   
                                        Comment on above:   Performed By: #### BRE HART, ERUR ####  
[facility]  
[address] [place] [address]  
Dr. Sam Gonzalez   
   
                                                    AST [Catalytic   
activity/Vol]   23 U/L          Normal          15-37           [facility]  
   
                                        Comment on above:   Performed By: #### D  
HAILEY, ERUR ####  
[facility]  
[address] [place] [address]  
Dr. Sam Gonzalez   
   
                      Bilirubin [Mass/Vol] 1.1 mg/dL  Critically high 0.2-1.0     
 [facility]  
   
                                        Comment on above:   Performed By: #### BRE HART, ERUR ####  
[facility]  
[address] [place] [address]  
Dr. Sam Gonzalez   
   
                      Calcium [Mass/Vol] 8.6 mg/dL  Normal     8.5-10.1   [facility]  
   
                                        Comment on above:   Performed By: #### D  
HAILEY, ERUR ####  
[facility]  
[address] [address]  
Dr. Sam Gonzalez   
   
                      Chloride [Moles/Vol] 106 mmol/L Normal          The   
[facility]  
   
                                        Comment on above:   Performed By: #### D  
HAILEY, ERUR ####  
[facility]  
[address] [place] [address]  
Dr. Sam Gonzalez   
   
                      CO2 [Moles/Vol] 27.7 mmol/L Normal     21.0-32.0  [facility]  
   
                                        Comment on above:   Performed By: #### D  
HAILEY, ERUR ####  
[facility]  
[address] [place] [address]  
Dr. Sam Gonzalez   
   
                                                    Creatinine   
[Mass/Vol]      1.05 mg/dL      Normal          0.70-1.30       [facility]  
   
                                        Comment on above:   Performed By: #### BRE HART, ERUR ####  
[facility]  
[address] [place] [address]  
Dr. Sam Gonzalez   
   
                      EGFR-AF AMERICAN >60        Normal     >=60       [facility]  
   
                                        Comment on above:   Performed By: #### BRE HART, ERUR ####  
[facility]  
[address] [place] [address]  
Dr. Sam Gonzalez   
   
                      EGFR-NON AF AMERICAN >60        Normal     >=60       The   
[facility]  
   
                                        Comment on above:   Performed By: #### D  
HAILEY, ERUR ####  
[facility]  
[address] [place] [address]  
Dr. Sam Gonzalez   
   
                                                    Globulin (S)   
[Mass/Vol]      3.0 g/dL        Normal                          [facility]  
   
                                        Comment on above:   Performed By: #### D  
HAILEY, ERUR ####  
[facility]  
[address] [place] [address]  
Dr. Sam Gonzalez   
   
                      Glucose [Mass/Vol] 83 mg/dL   Normal          [facility]  
   
                                        Comment on above:   Performed By: #### D  
HAILEY, ERUR ####  
[facility]  
[address] [address]  
Dr. Sam Gonzalez   
   
                                                    Potassium   
[Moles/Vol]     4.8 mmol/L      Normal          3.5-5.1         [facility]  
   
                                        Comment on above:   Performed By: #### D  
HAILEY, ERUR ####  
[facility]  
[address] [address]  
Dr. Sam Gonzalez   
   
                      Protein [Mass/Vol] 6.7 g/dL   Normal     6.4-8.2    [facility]  
   
                                        Comment on above:   Performed By: #### D  
HAILEY, ERUR ####  
[facility]  
[address] [address]  
Dr. Sam Gonzalez   
   
                      Sodium [Moles/Vol] 140 mmol/L Normal     136-145    [facility]  
   
                                        Comment on above:   Performed By: #### D  
HAILEY, ERUR ####  
[facility]  
[address] [place] [address]  
Dr. Sam Gonzalez   
   
                                                    Urea nitrogen   
[Mass/Vol]      22.0 mg/dL      Critically high 7.0-18.0        [facility]  
   
                                        Comment on above:   Performed By: #### D  
HAILEY, ERUR ####  
[facility]  
[address] [address]  
Dr. Sam Gonzalez   
   
                                                    Urea   
nitrogen/Creatinine   
[Mass ratio]    21.0 mg/mg      Normal                          [facility]  
   
                                        Comment on above:   Performed By: #### D  
HAILEY, ERUR ####  
[facility]  
[address] [place] [address]  
Dr. Sam Gonzalez   
   
                                                    PROTIMEon 2022   
   
                                                    INR Coag (PPP)   
[Relative time] 1.15 {INR}      Normal                          [facility]  
   
                                        Comment on above:   Performed By: #### D  
HAILEY, ERUR ####  
[facility]  
[address] [place] [address]  
Dr. Sam Gonzalez   
   
                      INR GUIDELINES SEE BELOW  Normal                [facility]  
   
                                        Comment on above:   Result Comment: SUSAN  
RED INR: 2.0 - 3.0 CONDITIONS NOT LISTED   
BELOW 2.5 - 3.5 FOR PROSTHETIC HEART VALVE REPLACEMENT 2.5 - 3.5   
RECURRENT THROMBOSIS   
   
                                                            Performed By: #### D  
HAILEY, ERUR ####  
[facility]  
[address] [address]  
Dr. Sam Gonzalez   
   
                      PT Coag (PPP) [Time] 12.3 s     Critically high 9.0-11.6    
 [facility]  
   
                                        Comment on above:   Performed By: #### D  
HAILEY, ERUR ####  
[facility]  
[address] [address]  
Dr. Sam Gonzalez   
   
                                                    PTTon 2022   
   
                                                    aPTT Coag (Bld)   
[Time]          30.4 s          Normal          22.3-36.2       [facility]  
   
                                        Comment on above:   Performed By: #### D  
HAILEY, ERUR ####  
[facility]  
[address] [address]  
Dr. Sam Gonzalez   
   
                                                    TROPONIN, HIGH SENSITIVITYon  
 2022   
   
                      HSTROP     11.2 pg/mL Normal     4.0-76.1   [facility]  
   
                                        Comment on above:   Result Comment: CUT-  
OFF POINTS HAVE BEEN ESTABLISHED BASED ON   
THE   
FOURTH UNIVERSAL DEFINITIONS OF MYOCARDIAL  
INFARCTION. THE UPPER REFERENCE LIMIT (URL) OF TROPONIN, DEFINED   
AS THE 99TH PERCENTILE OF  
cTnI DISTRIBUTION IN A REFERENCE POPULATION, HAS BEEN CONFIRMED   
AS THE DECISION THRESHOLD  
FOR MI DIAGNOSIS.   
   
                                                            Performed By: #### D  
HAILEY, ERUR ####  
[facility]  
[address] [place] [address]  
Dr. Sam Gonzalez   
   
                                                    TSHon 2022   
   
                      TSH        5.294 uIU/mL Critically high 0.358-3.740 [facility]  
   
                                        Comment on above:   Performed By: #### D  
HAILEY, ERUR ####  
[facility]  
[address] [place] [address]  
Dr. Sam Gonzalez   
   
                                                    ECG 12 lead ECGon 2022  
   
   
                                        ECG 12 lead ECG     [facility] [place] [address]  
  
Electrocardiograph   
Report  
Signed  
  
Patient: Enmanuel Arrington   
MR#: [id number]  
  
: 1945   
Acct:[id number]  
  
Age/Sex: 76 / M ADM   
Date: 22  
  
Loc:  Room: Type: [facility]  
Attending Dr: LAURA Murphy DO  
  
Ordering Provider:   
George Leonard MD,   
[facility]  
Date of Service:   
  
Accession #:   
([id number]) ECG/ECG 12   
lead ECG:   
Pre-cardioversion rhythm   
assessment  
  
  
Copies to:  
  
  
Test Reason :  
Blood Pressure : ***/***   
mmHG  
Vent. Rate : 058 BPM   
Atrial Rate : 078 BPM  
P-R Int : 000 ms QRS Dur   
: 072 ms  
QT Int : 404 ms P-R-T   
Axes : 000 063 064   
degrees  
QTc Int : 396 ms  
  
Atrial fibrillation with   
slow ventricular   
response  
Low voltage QRS  
Abnormal ECG  
No previous ECGs   
available  
Confirmed by LUZ BENSON DO ([id number]) on   
2022 6:03:18 PM  
  
Referred By: George Leonard Electronically   
Signed By:LUZ BENSON DO  
  
  
  
Transcribed By: MUS  
Signed By Luz Benson DO  [id number]            Normal                                  [facility]  
   
                                                    Electrolyteson 2022   
   
                      Chloride [Moles/Vol] 101 mmol/L Normal          [facility]  
   
                                        Comment on above:   Performed By: #### L  
YTES ####  
[address]   
   
                      CO2 [Moles/Vol] 25.3 mmol/L Normal     22.0-30.0  [facility]  
   
                                        Comment on above:   Result Comment: PERF  
ORMED BY:  
[place] [address]  
[phone]  
PATHOLOGIST MEDICAL DIRECTOR  
NAKITA BOLUNT M.D.   
   
                                                            Performed By: #### L  
YTES ####  
[address]   
   
                                                    Potassium   
[Moles/Vol]     4.4 mmol/L      Normal          3.5-5.1         [facility]  
   
                                        Comment on above:   Performed By: #### L  
YTES ####  
[facility]  
[address] [place] [address]   
   
                      Sodium [Moles/Vol] 135 mmol/L Low        136-146    [facility]  
   
                                        Comment on above:   Performed By: #### L  
YTES ####  
[address]   
   
                                                    No Panel Informationon    
   
                                 25.3\S\25.3 Normal     22.0-30.0  [facility]   
250 DO  
Work Phone:   
[phone]  
   
                                        Comment on above:   PERFORMED BY:[facility] [place], [place] [id number] MEDICAL   
DIRECTORNAKITA BLOUNT M.D.   
   
                                 101\S\101  Normal          [facility]   
[facility]   
250 DO  
Work Phone:   
[phone]  
   
                                 4.4\S\4.4  Normal     3.5-5.1    [facility]   
[facility]   
250 DO  
Work Phone:   
[phone]  
   
                                                135\S\135       below low   
threshold                 136-146                   [facility]   
[facility]   
250 DO  
Work Phone:   
[phone]  
   
                                                    Serum or plasma chloride mireille  
surement (moles/volume)Ordered By: George Leonard on  
   
2022   
   
                      Chloride [Moles/Vol] 101 mmol/L                 [facility]  
   
                                                    Serum or plasma potassium me  
asurement (moles/volume)Ordered By: George Leonard   
on   
2022   
   
                                                    Potassium   
[Moles/Vol]     4.4 mmol/L                      3.5-5.1         [facility]  
   
                                                    Serum or plasma sodium measu  
rement (moles/volume)Ordered By: George Leonard on   
2022   
   
                      Sodium [Moles/Vol] 135 mmol/L            136-146    [facility]  
   
                                                    Serum or plasma total carbon  
 dioxide measurement (moles/volume)Ordered By:   
George Leonard on 2022   
   
                      CO2 [Moles/Vol] 25.3 mmol/L            22.0-30.0  [facility]  
   
                                                    COVID-19 Antigenon   
2   
   
                                        COVID-19 Antigen    Healthcare Worker?:   
N  
Vanessa Reference  
------------------------  
------------------------  
------  
Vanessa Reference  
Negative  
Vanessa Blank  
------------------------  
----------------  
COVID19 Pos Results  
Positive results will   
only be called to  
COVID19 Det Results  
Providers for the   
following groups of   
patients:  
COVID19 Pos Results  
Pre-Surgical Testing,   
Emergency Room, and   
Inpatients.  
[facility]essa Blank  
------------------------  
----------------  
SARS-CoV+SARS-CoV-2   
(COVID-19) Ag [Presence]   
in Respiratory specimen   
by Rapid immunoassay  
Positive for SARS   
Antigen by MILA  
[facility]ank Space  
------------------------  
------------------------  
------  
Vanessa Disclaimer  
The Vanessa SARS Antigen   
MILA does not   
differentiate  
Vanessa Disclaimer  
between SARS-CoV and   
SARS-CoV-2.  
COVID19 [facility]  
------------------------  
------------------------  
------  
Vanessa Disclaimer  
This test was developed   
and its performance  
Vanessa Disclaimer  
characteristic   
determined by [email or web address] and  
Vanessa Disclaimer  
validated at [facility].   
This  
Vanessa Disclaimer  
test has not been FDA   
cleared or approved.   
This  
Vanessa Disclaimer  
test has been authorized   
by FDA under an   
Emergency Use  
Vanessa Disclaimer  
Authorization (EUA).   
This test has been   
validated  
Vanessa Disclaimer  
in accordance with the   
FDA's Guidance Document   
(Policy  
Vanessa Disclaimer  
for Diagnostics Testing   
in Laboratories   
Certified to  
Vanessa Disclaimer  
Perform High Complexity   
Testing under CLIA prior   
to  
Vanessa Disclaimer  
Emergency Use   
Authorization for   
Coronavirus  
Vanessa Disclaimer  
iseas during the   
Public Health Emergency)  
Vanessa Disclaimer  
issued on 2020.   
This test is only   
authorized  
Vanessa Disclaimer  
for the duration of time   
the declaration that  
Vanessa Disclaimer  
circumstances exist   
justifying the   
authorization of  
Vanessa Disclaimer  
the emergency use of in   
vitro diagnostic tests   
for  
Vanessa Disclaimer  
detection of SARS-CoV-2   
virus and/or diagnosis   
of  
Vanessa Disclaimer  
COVID-19 infection under   
section 564(b)(1) of the  
Vanessa Disclaimer  
Act, 21 U.S.C.   
360bbb-3(b)(1), unless   
the  
Vanessa Disclaimer  
authorization is   
terminated or revoked   
sooner.  
PERFORMED BY:  
[facility]  
[address] [facility] [place] [address]  
[phone]  
PATHOLOGIST MEDICAL   
DIRECTOR  
NAKITA BLOUNT M.D.    Normal                                  [facility]  
   
                                        Comment on above:   Performed By: #### C  
OVID-19 VANESSA, SOFIAPOS ####  
[facility]  
[address] [place] [address] [place]   
   
                                                    COVID-19 SOFIAOrdered By: LAURA Murphy on 2022   
   
                                                    SARS-CoV+SARS-CoV-2   
(COVID-19) Ag   
IA.rapid Ql (Resp) Positive                        Negative        [facility]  
   
                                        Comment on above:   This is a duplicate   
Vanessa SARS Antigen (MILA) result to be used  
   
for statistical tracking purpose only.   
   
                                                    Laboratory - Microbiology an  
d Antimicrobial susceptibilityon 2022   
   
                                                    SARS-CoV-2   
(COVID-19) RNA   
ARMANDO+probe Ql (Unsp   
spec)                                                           [facility]   
250 DO  
Work Phone:   
[phone]  
   
                                                    No Panel InformationOrdered   
By: LAURA Murphy on 2022   
   
                      SARS Antigen (LFIA)                                  [facility]  
   
                                                    No Panel Informationon    
   
                                                Positive        Critically   
abnormal                  Negative                  [facility]   
250 DO  
Work Phone:   
[phone]  
   
                                        Comment on above:   This is a duplicate   
Vanessa SARS Antigen (MILA) result to be used  
   
for statistical tracking purpose only.PERFORMED BY:[facility] [place], [place]   
[id number] MEDICAL DIRECTORNAKITA BLOUNT M.D.   
   
                                                    Vanessa Ag Positiveon 20  
22   
   
                                Vanessa Ag Positive Positive        Critically   
abnormal                  Negative                  [facility]  
   
                                        Comment on above:   Result Comment: This  
 is a duplicate Vanessa SARS Antigen (MILA)   
result to be  
used for statistical tracking purpose only.  
PERFORMED BY:  
[facility]  
[address] [place] [address]  
[phone]  
PATHOLOGIST MEDICAL DIRECTOR  
NAKITA BLOUNT M.D.   
   
                                                            Performed By: #### C  
OVID-19 VANESSA, SOFIAPOS ####  
[facility]  
[address] [id number] [facility]   
   
                                                    Office Visit (Cardiology)on   
2022   
   
                                        Follow-up visit     Diagnoses/Problems  
Assessed  
Family history of   
cerebrovascular accident   
(CVA) (V17.1) (Z82.3) :   
Father  
Atrial fibrillation   
(427.31) (I48.91)  
Overweight with body   
mass index (BMI) of 25   
to 25.9 in adult   
(278.02,V85.21)  
(E66.3,Z68.25)  
Former smoker (V15.82)   
(Z87.891)  
quit somking at age 34  
Dyspnea on exertion   
(786.09) (R06.00)  
Nonischemic   
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   
(I42.8)  
Orders  
Atrial fibrillation  
Cardioversion;   
Status:Active -   
Retrospective   
Authorization; Requested   
for:2022;  
IO EKG   
Electrocardiogram- 12   
Lead; Status:Complete;   
Done: 2022  
Atrial fibrillation,   
Nonischemic   
cardiomyopathy  
Start: Carvedilol 6.25   
MG Oral Tablet (Coreg);   
TAKE 1 TABLET TWICE   
DAILY WITH  
MEALS  
Start: Losartan   
Potassium 25 MG Oral   
Tablet; TAKE 1 TABLET   
DAILY AS DIRECTED  
Nonischemic   
cardiomyopathy  
Start: Eplerenone 25 MG   
Oral Tablet; TAKE 1   
TABLET DAILY  
Overweight with body   
mass index (BMI) of 25   
to 25.9 in adult  
Healthy Weight Tips;   
Status:Complete -   
Retrospective   
Authorization; Done:   
2022  
Some eating tips that   
can help you lose   
weight.; Status:Complete   
- Retrospective  
Authorization; Done:   
2022  
SocHx: Former smoker  
Tobacco Use Screening;   
Status:Complete; Done:   
2022  
Patient Instructions  
By signing my name   
below, I, Elham Avelar LPN. ,Scribe,   
attest that this   
documentation has been   
prepared under the   
direction and in the   
presence of Dr. Agustín Murphy DO.  
Please bring all   
medicines, vitamins, and   
herbal supplements with   
you when you come to the   
office.  
Prescriptions will not   
be filled unless you are   
compliant with your   
follow up appointments   
or have a follow up   
appointment scheduled as   
per instruction of your   
physician. Refills   
should be requested at   
the time of your visit.  
FALL PREVENTION   
EDUCATION GIVEN  
Follow up after testing   
completed  
  
Chief Complaint  
ENMANUEL ARRINGTON is being seen   
for a consultation for   
HOY ABN ECHO.  
76-year-old gentleman   
seen in cardiology   
consultation at the   
request of Dr. Jansen for   
abnormal echocardiogram   
and stress imaging. He   
complains of exertional   
dyspnea he states that   
occurred around or just   
prior to his first   
booster shot for COVID.   
He also admits that he   
probably had COVID   
illness but was never   
tested earlier in the   
year.  
His chief complaints   
right now are occasional   
lower extremity edema   
right greater than left   
that is improved with   
transient use of   
diuretics administered   
by Dr. Jansen. Exertional   
dyspnea with any type of   
walking or walking up   
stairs or work related   
activities. He denies   
angina, true heart   
failure   
hospitalizations,   
syncope. He  
He does admit to history   
of irregular heartbeat   
dating back to  when   
he was in [place]   
details of which are   
unknown. 4 weeks ago   
when in Dr. Jansen's office   
for the first time in   
many years, ECG was   
performed revealing   
atrial fibrillation with   
controlled ventricular   
response. Subsequent   
stress perfusion imaging   
and echocardiography   
were performed at   
[facility]   
revealing normal   
perfusion scan, but   
atrial fibrillation so   
no ejection fraction   
estimate was performed   
on perfusion scan.   
Echocardiogram revealed   
reduced left ventricular   
function with ejection   
fraction estimated to be   
40 to 45%.  
He was then placed on   
Eliquis for his A. fib,   
and it appears that he   
has been on Eliquis for   
2 to 3 weeks. Patient   
and his wife will   
corroborate when his   
start date for Eliquis   
was and return that   
information.  
My estimation is that he   
has left ventricular   
systolic heart failure   
likely secondary to   
chronic atrial   
fibrillation (duration   
of which is unknown). It   
is worth a shot at least   
to try cardioversion at   
least once in order to   
restore rhythm.  
Recommendations,   
informed decision-making   
process, risk benefits   
alternatives of   
discussed with patient.   
We will proceed with   
guideline directed   
medical therapies   
including institution of   
carvedilol, losartan,   
eplerenone, and elective   
cardioversion in the end   
of  at least 6 weeks   
after its initiation of   
Eliquis, and follow-up   
thereafterwards. We may   
need to institute loop   
diuretics if necessary.   
Otherwise if atrial   
fibrillation continues   
to be problematic I will   
have to refer him either   
to general cardiology or   
electrophysiology in the   
future otherwise I will   
be happy to take care of   
the left ventricular   
function issues at this   
moment.  
  
Active Problems Problems  
Former smoker (V15.82)   
(Z87.891)  
quit somking at age 34  
Surgical History  
Problems  
History of Middle ear   
surgery  
History of Oral surgery  
History of Tonsillectomy  
Current Meds  
  
Medication   
NameInstruction  
Eliquis 5 MG Oral   
TabletTake 1 tablet   
twice daily  
Protonix 40 MG Oral   
Tablet Delayed   
ReleaseTAKE 1 TABLET   
DAILY.  
Allergies  
Medication  
No Known Drug Allergies  
Recorded By: Almita Alford; 2022   
10:01:43 AM  
Social History  
Problems  
Consumes alcohol   
occasionally (V49.89)   
(Z78.9)  
Daily caffeine   
consumption  
2 cups of coffee daily,   
pop three times weekly  
Former smoker (V15.82)   
(Z87.891)  
quit somking at age 34  
Illicit drug use (305   
(more content not   
included)...        Normal                                  [facility]  
   
                                                    PHQ-2 VITALSon 2022   
   
                                        Fall risk assessment b) One or more fall  
s in   
the last year                                               [facility]   
[employer]   
250 DO  
Work Phone:   
[phone]  
   
                                                    Tobacco use status   
[facility]            b) No                                           [facility]   
[employer]   
250 DO  
Work Phone:   
[phone]  
   
                      PHQ-2 VITALS Yes                              [facility]  
o   
[facility]   
250 DO  
Work Phone:   
[phone]  
   
                                                    NM david perf SPECT rest stron  
 2022   
   
                                                    NM david perf SPECT   
rest str                                [facility] [place] [address]  
  
Nuclear Medicine Report  
Signed  
  
Patient: Enmanuel Arrington   
MR#: [id number]  
  
: 1945   
Acct:[id number]  
  
Age/Sex: 76 / M ADM   
Date: 22  
  
Loc: [place] Room: Type: [facility]  
Attending Dr: Leatha Jansen MD  
  
Ordering Provider:   
Leatha Jansen MD  
Date of Service:   
22  
Accession #:   
([id number] NM/NM david   
perf SPECT rest str:   
DYSPNEA, PALPITATIONS,   
CHEST PAIN/ A-FIB  
  
  
Copies to: MD Jackelin Borja MD  
  
  
REFERRING PHYSICIAN:   
Leatha Jansen MD  
  
REASON FOR STUDY: Chest   
pain, shortness of   
breath.  
  
PROCEDURE: The patient   
underwent one-day   
rest/stress protocol.   
Rest images obtained by   
injecting  
6.1 mCi of Cardiolite.   
Stress images obtained   
by injecting 18.6 mCi of   
Cardiolite.   
Subsequently,  
perfusion studies were   
performed. Gated SPECT   
was not done due to the   
presence of atrial  
fibrillation.  
  
IMAGING RESULT: This   
appears to be fair   
study. It appears to be   
fairly normal. There is   
no clear  
pattern of ischemia or   
myocardial infarction.  
  
CONCLUSION:  
  
1. No ischemia or   
myocardial infarction.  
2. Left ejection   
fraction study could not   
be performed due to the   
presence of atrial  
fibrillation.  
  
  
  
Transcribed By: CHIKA   
22 [id number]  
Dictated By: Jackelin De La Garza MD 22   
1113  
  
Signed By:  
22 1132       [facility]  
   
                                                    STR cardiac stress/lexiscano  
n 2022   
   
                                                    STR cardiac   
stress/lexiscan                         [facility] [address] [id number]  
  
Cardiac Stress Test  
Signed  
  
Patient: Enmanuel Arrington   
MR#: [id number]  
  
: 1945   
Acct:[id number]  
  
Age/Sex: 76 / M ADM   
Date: 22  
  
Loc: [place] Room: Type: [facility]  
Attending Dr: Leatha Jansen MD  
Copies to: MD Jackelin Borja MD  
  
  
  
Ordering Provider:   
Leatha Jansen MD  
Date of Service:   
22  
Accession #:   
([id number]) STR/STR   
cardiac stress/lexiscan:   
afib ,dyspnea   
palpitations cp  
  
  
  
  
REFERRING PHYSICIAN:   
Leatha Jansen MD  
  
REASON FOR STUDY:   
Shortness of breath and   
palpitation.  
  
PROCEDURE: The patient   
underwent a Lexiscan   
myocardial perfusion   
study. The patient was   
injected  
with 0.4 mg of Lexiscan,   
following which no   
symptoms reported. Blood   
pressure and heart   
response to  
Lexiscan was   
physiologic. Baseline   
ECG showed normal sinus   
rhythm, no ST-T changes.   
Following  
Lexiscan, no changes   
were seen.  
  
CONCLUSION:  
1. Lexiscan Cardiolite   
stress test without   
diagnostic ST-T changes   
for ischemia.  
2. No provoked chest   
pain or arrhythmia.  
3. Appropriate   
hemodynamic response to   
Lexiscan.  
4. Perfusion images will   
be dictated separately.  
  
  
  
Transcribed By: CHIKA   
22 0954  
Dictated By: Jackelin De La Garza MD 22   
1056  
  
Signed By:  
22       [facility]  
   
                                                    ECH echo transthoracicon    
   
                                                    [facility] echo   
transthoracic                           [facility] [address] [address]  
  
Echocardiogram  
Signed  
  
Patient: Enmanuel Arrington   
MR#: [id number]  
  
: 1945   
Acct:[id number]  
  
Age/Sex: 76 / M ADM   
Date: 22  
  
Loc: [place] Room: Type: [facility]  
Attending Dr: Leatha Jansen MD  
  
Ordering Provider:   
Leatha Jansen MD  
Date of Service:   
22/  
Accession #:   
([id number]) ECH/ECH   
echo transthoracic:   
DYSPNEA, PALPITATONS,   
CHEST PAIN, A-FIB.  
  
  
Copies to: MD George Borja MD,   
FACC  
  
  
PM  
MRN: [id number] Patient   
Location: [place]  
  
: 1945 Gender:   
Male  
(MM/DD/YYYY)  
  
Age: 76 Years Ordering   
Physician: Leatha aJnsen  
  
Height: 67 in Referring   
Physician: LAURA Murphy  
  
Weight: 165.003 lb   
Performed By: NOHEMI Shirley  
  
BSA: 1.86 m2  
  
BP: 138 / 96 mmHg HR: 65   
bpm  
  
Reason For Study:   
DYSPNEA,  
  
PALPITATONS, CHEST PAIN,   
A-  
FIB.  
  
History: former smoker  
  
+-----------------------  
------------------------  
------------------------  
----+  
Interpretation Summary  
  
The left ventricular   
size and thickness are   
normal.  
There are regional wall   
motion abnormalities as   
specified.  
  
There is septal   
akinesis.  
Septal motion is   
consistent with   
conduction abnormality.  
Ejection Fraction =   
40-45%.  
The left atrium appears   
mildly dilated.  
  
The right atrium is   
mildly dilated.  
  
Procedure/Quality: A   
two-dimensional   
transthoracic  
echocardiogram with   
color flow and Doppler   
was  
performed.  
  
Left Ventricle: The left   
ventricular size and   
thickness are  
normal. Ejection   
Fraction = 40-45%. There   
are regional wall  
motion abnormalities as   
specified. There is   
septal akinesis.  
  
Septal motion is   
consistent with   
conduction abnormality.  
  
Left Atrium: The left   
atrium appears mildly   
dilated. The  
atrial septum appears   
normal.  
  
Right Atrium: The right   
atrium is mildly   
dilated.  
  
Right Ventricle: The   
right ventricular size,   
thickness and  
  
function are normal.  
  
Aortic Valve: The aortic   
valve is normal in   
structure and  
function.  
  
Mitral Valve: The mitral   
valve is normal.  
  
Tricuspid Valve: The   
tricuspid valve is   
normal.  
  
Pulmonic Valve: The   
pulmonic valve is not   
well visualized.  
  
Arteries: The aortic   
root is normal size.  
  
Pericardium/Pleura: No   
pericardial effusion   
seen. There is  
no pleural effusion.  
  
IVC/Hepatic Veins: The   
inferior vena cava was   
not  
  
visualized during the   
exam.  
  
Miscellaneous: No   
thrombus, vegetation or   
mass is seen.  
  
MMode/2D Measurements   
Calculations  
  
IVSd (0.7-1.1 cm): 0.85   
cm LVIDd (3.7-5.4 cm):   
4.3 cm  
  
LVPWd (0.7-1.1 cm): 1.07   
cm LVIDs (2.3-3.6 cm):   
3.0 cm  
  
LA dimension (2.3-4.0   
cm): 4.5 cm Ao root diam   
(2.0-3.2 cm): 3.1 cm  
  
FS: 29.7 % Ao root area:   
7.6 cm2  
  
EDV(Teich): 83.1 ml  
  
ESV(Teich): 35.7 ml  
  
EF(Teich): 57.0 %  
  
Doppler Measurements   
Calculations  
  
MV E max yaw: 74.1   
cm/sec  
  
MV A max yaw: 29.1   
cm/sec  
  
MV dec time: 0.38 sec  
  
MV dec slope: 196.7   
cm/sec??  
  
E/E' lat: 5.4  
  
E/E' med: 6.4  
  
TV max P.0 mmHg  
  
TR max yaw: 221.3 cm/sec  
  
TR max P.6 mmHg  
  
RAP systole: 10.0 mmHg  
  
+-----------------------  
------------------------  
------------------------  
-+  
+-----------------------  
------------------------  
------------------------  
-+  
+-----------------------  
---+--------------------  
------------------------  
-+  
: Electronically signed   
:  
: by: GEORGE MORRISON. :  
: MD JASMYNE, :  
: :  
: [facility] :  
: on: 2022, 1:49 :  
: :  
: PM :  
  
  
  
  
  
Transcribed By: SCV  
Performed At: 22   
1222  
Signed By: George Leonard MD, [facility]   
22 1349       [facility]  
   
                                                    BNPon 2022   
   
                                                    Natriuretic peptide   
B (Bld) [Mass/Vol] 1031.0 pg/mL    Normal          <=1,800.0       [facility]  
   
                                        Comment on above:   Performed By: #### T  
SH, CMP, LIPID, CMADM, BNP, T7 ####  
[facility]  
[address] [place] [address]  
Dr. Sam Gonzalez   
   
                                                    CARDIAC FRANCIA ADMITon   
022   
   
                                                    CK [Catalytic   
activity/Vol]   55 U/L          Normal                    [facility]  
   
                                        Comment on above:   Performed By: #### T  
SH, CMP, LIPID, CMADM, BNP, T7 ####  
[facility]  
[address] [address]  
Dr. Sam Gonzalez   
   
                      CK.MB [Mass/Vol] 1.57 ng/mL Normal     <=2.37     [facility]  
   
                                        Comment on above:   Performed By: #### T  
SH, CMP, LIPID, CMADM, BNP, T7 ####  
[facility]  
[address] [address]  
Dr. Sam Gonzalez   
   
                      HSTROP     13.8 pg/mL Normal     4.0-42.2   [facility]  
   
                                        Comment on above:   Result Comment: CUT-  
OFF POINTS HAVE BEEN ESTABLISHED BASED ON   
THE   
FOURTH UNIVERSAL DEFINITIONS OF MYOCARDIAL  
INFARCTION. THE UPPER REFERENCE LIMIT (URL) OF TROPONIN, DEFINED   
AS THE 99TH PERCENTILE OF  
cTnI DISTRIBUTION IN A REFERENCE POPULATION, HAS BEEN CONFIRMED   
AS THE DECISION THRESHOLD  
FOR MI DIAGNOSIS.   
   
                                                            Performed By: #### T  
SH, CMP, LIPID, CMADM, BNP, T7 ####  
[facility]  
[address] [address]  
Dr. Sam Gonzalez   
   
                      DAVID        56.0 ng/mL Normal     <=121.0    [facility]  
   
                                        Comment on above:   Performed By: #### T  
SH, CMP, LIPID, CMADM, BNP, T7 ####  
[facility]  
[address] [place] [address]  
Dr. Sam Gonzalez   
   
                                                    CBC AUTO DIFFon 2022   
   
                      BASO #     0.1 103/ul Normal     0.0-0.1    [facility]  
   
                                        Comment on above:   Performed By: #### D  
HAILEY, ERUR ####  
[facility]  
[address] [place] [address]  
Dr. Sam Gonzalez   
   
                                                    Basophils/100 WBC   
(Bld)           1.1 %           Normal          0.2-2.0         [facility]  
   
                                        Comment on above:   Performed By: #### D  
HAILEY, ERUR ####  
[facility]  
[address] [place] [address]  
Dr. Sam Gonzalez   
   
                      EO #       0.8 103/ul Critically high 0.0-0.7    [facility]  
   
                                        Comment on above:   Performed By: #### D  
HAILEY, ERUR ####  
[facility]  
[address] [place] [address]  
Dr. Sam Gonzalez   
   
                                                    Eosinophils/100 WBC   
(Bld)           14.7 %          Critically high 0.9-7.0         [facility]  
   
                                        Comment on above:   Performed By: #### D  
HAILEY, ERUR ####  
[facility]  
[address] [place] [address]  
Dr. Sam Gonzalez   
   
                                                    Erythrocyte   
distribution width   
(RBC) [Ratio]   14.0 %          Normal          11.0-15.0       [facility]  
   
                                        Comment on above:   Performed By: #### BRE HART, ERUR ####  
[facility]  
[address] [place] [address]  
Dr. Sam Gonzalez   
   
                                                    Hematocrit (Bld)   
[Volume fraction] 43.1 %          Normal          42.0-54.0       [facility]  
   
                                        Comment on above:   Performed By: #### BRE HART, ERUR ####  
[facility]  
[address] [place] [address]  
Dr. Sam Gonzalez   
   
                                                    Hemoglobin (Bld)   
[Mass/Vol]      14.4 g/dL       Normal          14.0-18.0       [facility]  
   
                                        Comment on above:   Performed By: #### D  
HAILEY, ERUR ####  
[facility]  
[address] [address]  
Dr. Sam Gonzalez   
   
                      IG #       0.01 10e3/ul Normal     0.00-0.03  [facility]  
   
                                        Comment on above:   Performed By: #### D  
DOMINIKD, ERUR ####  
[facility]  
[address] [address]  
Dr. Sam Gonzalez   
   
                      IG %       0.2 %      Normal     0.0-0.5    [facility]  
   
                                        Comment on above:   Performed By: #### D  
HAILEY, ERUR ####  
[facility]  
[address] [address]  
Dr. Sam Gonzalez   
   
                      LYMPH #    1.7 103/ul Normal     1.2-3.8    [facility]  
   
                                        Comment on above:   Performed By: #### D  
HAILEY, ERUR ####  
[facility]  
[address] [address]  
Dr. Sam Gonzalez   
   
                                                    Lymphocytes/100 WBC   
(Bld)           32.7 %          Normal          20.5-60.0       [facility]  
   
                                        Comment on above:   Performed By: #### D  
HAILEY, ERUR ####  
[facility]  
[address] [place] [address]  
Dr. Sam Gonzalez   
   
                      MANUAL DIFF REQ NO         Normal                [facility]  
   
                                        Comment on above:   Performed By: #### D  
HAILEY, ERUR ####  
[facility]  
[address] [address]  
Dr. Sam Gonzalez   
   
                                                    MCH (RBC) [Entitic   
mass]           34.6 pg         Critically high 25.9-34.0       [facility]  
   
                                        Comment on above:   Performed By: #### D  
HAILEY, ERUR ####  
[facility]  
[address] [address]  
Dr. Sam Gonzalez   
   
                                                    MCHC (RBC)   
[Mass/Vol]      33.4 g/dL       Normal          29.9-35.2       [facility]  
   
                                        Comment on above:   Performed By: #### D  
HAILEY, ERUR ####  
[facility]  
[address] [address]  
Dr. Sam Gonzalez   
   
                                                    MCV (RBC) [Entitic   
vol]            103.6 fL        Critically high 80.0-94.0       [facility]  
   
                                        Comment on above:   Performed By: #### D  
HAILEY, ERUR ####  
[facility]  
[address] [place] [address]  
Dr. Sam Gonzalez   
   
                      MONO #     0.6 103/ul Normal     0.3-0.8    [facility]  
   
                                        Comment on above:   Performed By: #### D  
HAILEY, ERUR ####  
[facility]  
[address] [place] [address]  
Dr. Sam Gonzalez   
   
                                                    Monocytes/100 WBC   
(Bld)           10.4 %          Normal          1.7-12.0        [facility]  
   
                                        Comment on above:   Performed By: #### BRE HART, ERUR ####  
[facility]  
[address] [place] [address]  
Dr. Sam Gonzalez   
   
                      NEUT #     2.2 103/ul Normal     1.4-6.5    [facility]  
   
                                        Comment on above:   Performed By: #### BRE HART, ERUR ####  
[facility]  
[address] [place] [address]  
Dr. Sam Gonzalez   
   
                                                    Neutrophils/100 WBC   
(Bld)           40.9 %          Critically low  43.0-75.0       [facility]  
   
                                        Comment on above:   Performed By: #### BRE HART, ERUR ####  
[facility]  
[address] [place] [address]  
Dr. Sam Gonzalez   
   
                                                    Platelet mean volume   
(Bld) [Entitic vol] 9.6 fL          Normal          9.5-13.5        [facility]  
   
                                        Comment on above:   Performed By: #### BRE HART, ERUR ####  
[facility]  
[address] [place] [address]  
Dr. Sam Gonzalez   
   
                      PLT        154 103/ul Normal     150-450    [facility]  
   
                                        Comment on above:   Performed By: #### BRE HART, ERUR ####  
[facility]  
[address] [place] [address]  
Dr. Sam Gonzalez   
   
                      RBC        4.16 106/ul Critically low 4.70-6.10  [facility]  
   
                                        Comment on above:   Performed By: #### BRE HART, ERUR ####  
[facility]  
[address] [address]  
Dr. Sam Gonzalez   
   
                      WBC        5.3 103/ul Normal     4.0-11.0   [facility]  
   
                                        Comment on above:   Performed By: #### D  
GREER HARTR ####  
[facility]  
[address] [address]  
Dr. Sam Gonzalez   
   
                                                    FREE THYROXINE INDEX T7on    
   
                      FTI        2.59       Normal                [facility]  
   
                                        Comment on above:   Performed By: #### T  
SH, CMP, LIPID, CMADM, BNP, T7 ####  
[facility]  
[address] [address]  
Dr. Sam Gonzalez   
   
                      T3U        35.0 %     Normal     23.5-40.5  [facility]  
   
                                        Comment on above:   Performed By: #### T  
SH, CMP, LIPID, CMADM, BNP, T7 ####  
[facility]  
[address] [address]  
Dr. Sam Gonzalez   
   
                      T4 [Mass/Vol] 7.40 ug/dL Normal     5.53-11.00 [facility]  
   
                                        Comment on above:   Performed By: #### T  
SH, CMP, LIPID, CMADM, BNP, T7 ####  
[facility]  
[address] [address]  
Dr. Sam Gonzalez   
   
                                                    GLYCOHEMOGLOBIN A1Con 2022   
   
                                        ADA RECOMMENDATION  ADA THERAPEUTIC TARG  
ET   
6.0 - 7.0 ACTION   
SUGGESTED > 7.0     Normal                                  [facility]  
   
                                        Comment on above:   Performed By: #### A  
1C ####  
[facility]  
[address] [address]  
Dr. Sam Gonzalez   
   
                      Glucose [Mass/Vol] 111 mg/dL  Normal                [facility]  
   
                                        Comment on above:   Performed By: #### A  
1C ####  
[facility]  
[address] [place] [address]  
Dr. Sam Gonzalez   
   
                                                    HbA1c (Bld) [Mass   
fraction]       5.5 %           Normal          <=6.0           [facility]  
   
                                        Comment on above:   Performed By: #### A  
1C ####  
[facility]  
[address] [place] [address]  
Dr. Sam Gonzalez   
   
                                                    LIPID PROFILEon 2022   
   
                      CHOL-HDL RATIO NORM SEE BELOW  Normal                [facility]  
   
                                        Comment on above:   Result Comment: 3.3   
- 4.4 LOW RISK 4.4 - 7.1 AVERAGE RISK 7.1   
-   
11.0 MODERATE RISK >11.0 HIGH RISK   
   
                                                            Performed By: #### T  
SH, CMP, LIPID, CMADM, BNP, T7 ####  
[facility]  
[address] [address]  
Dr. Sam Gonzalez   
   
                                                    Cholesterol   
[Mass/Vol]      166 mg/dL       Normal          <=200           [facility]  
   
                                        Comment on above:   Performed By: #### T  
SH, CMP, LIPID, CMADM, BNP, T7 ####  
[facility]  
[address] [address]  
Dr. Sam Gonzalez   
   
                                                    Cholesterol in HDL   
[Mass/Vol]      68 mg/dL        Critically high 40-60           [facility]  
   
                                        Comment on above:   Performed By: #### T  
SH, CMP, LIPID, CMADM, BNP, T7 ####  
[facility]  
[address] [address]  
Dr. Sam Gonzalez   
   
                                                    Cholesterol in LDL   
[Mass/Vol]      89.4 mg/dL      Normal                          [facility]  
   
                                        Comment on above:   Performed By: #### T  
SH, CMP, LIPID, CMADM, BNP, T7 ####  
[facility]  
[address] [address]  
Dr. Sam Gonzalez   
   
                                                    Cholesterol.total/Ch  
olesterol in HDL   
[Mass ratio]    2.4 {ratio}     Normal                          [facility]  
   
                                        Comment on above:   Performed By: #### T  
SH, CMP, LIPID, CMADM, BNP, T7 ####  
[facility]  
[address] [address]  
Dr. Sam Gonzalez   
   
                                        HDL NORMAL          > or = 60 mg/dl - LO  
W   
CARDIOVASCULAR RISK <40   
mg/dl - HIGH   
CARDIOVASCULAR RISK Normal                                  [facility]  
   
                                        Comment on above:   Performed By: #### T  
SH, CMP, LIPID, CMADM, BNP, T7 ####  
[facility]  
[address] [address]  
Dr. Sam Gonzalez   
   
                      LDL CALC NORMAL SEE BELOW  Normal                [facility]  
   
                                        Comment on above:   Result Comment: <100  
 mg/dl OPTIMAL 100 - 129 mg/dl NEAR OR   
ABOVE   
OPTIMAL 130 - 159 mg/dl BORDERLINE HIGH 160 - 189 mg/dl HIGH >190   
mg/dl VERY HIGH   
   
                                                            Performed By: #### T  
SH, CMP, LIPID, CMADM, BNP, T7 ####  
[facility]  
[address] [address]  
Dr. Sam Gonzalez   
   
                                                    Triglyceride   
[Mass/Vol]      43 mg/dL        Normal          <=150           [facility]  
   
                                        Comment on above:   Performed By: #### T  
SH, CMP, LIPID, CMADM, BNP, T7 ####  
[facility]  
[address] [address]  
Dr. Sam Gonzalez   
   
                      VLDL CALC  8.6 mg/dL  Normal                [facility]  
   
                                        Comment on above:   Performed By: #### T  
SH, CMP, LIPID, CMADM, BNP, T7 ####  
[facility]  
[address] [address]  
Dr. Sam Gonzalez   
   
                                                    PROF 14(COMP METB)on   
022   
   
                      Albumin [Mass/Vol] 3.7 g/dL   Normal     3.4-5.0    [facility]  
   
                                        Comment on above:   Performed By: #### T  
SH, CMP, LIPID, CMADM, BNP, T7 ####  
[facility]  
[address] [address]  
Dr. Sam Gonzalez   
   
                                                    Albumin/Globulin   
[Mass ratio]    1.2 {ratio}     Normal                          [facility]  
   
                                        Comment on above:   Performed By: #### T  
SH, CMP, LIPID, CMADM, BNP, T7 ####  
[facility]  
[address] [address]  
Dr. Sam Gonzalez   
   
                                                    ALP [Catalytic   
activity/Vol]   81 U/L          Normal                    [facility]  
   
                                        Comment on above:   Performed By: #### T  
SH, CMP, LIPID, CMADM, BNP, T7 ####  
[facility]  
[address] [address]  
Dr. Sam Gonzalez   
   
                                                    ALT [Catalytic   
activity/Vol]   26 U/L          Normal          16-63           [facility]  
   
                                        Comment on above:   Performed By: #### T  
SH, CMP, LIPID, CMADM, BNP, T7 ####  
[facility]  
[address] [address]  
Dr. Sam Gonzalez   
   
                                                    Anion gap   
[Moles/Vol]     11.8 mmol/L     Normal                          [facility]  
   
                                        Comment on above:   Performed By: #### T  
SH, CMP, LIPID, CMADM, BNP, T7 ####  
[facility]  
[address] [place] [address]  
Dr. Sam Gonzalez   
   
                                                    AST [Catalytic   
activity/Vol]   19 U/L          Normal          15-37           [facility]  
   
                                        Comment on above:   Performed By: #### T  
SH, CMP, LIPID, CMADM, BNP, T7 ####  
[facility]  
[address] [address]  
Dr. Sam Gonzalez   
   
                      Bilirubin [Mass/Vol] 1.6 mg/dL  Critically high 0.2-1.3     
 [facility]  
   
                                        Comment on above:   Performed By: #### T  
SH, CMP, LIPID, CMADM, BNP, T7 ####  
[facility]  
[address] [place] [address]  
Dr. Sam Gonzalez   
   
                      Calcium [Mass/Vol] 8.6 mg/dL  Normal     8.5-10.1   [facility]  
   
                                        Comment on above:   Performed By: #### T  
SH, CMP, LIPID, CMADM, BNP, T7 ####  
[facility]  
[address] [place] [address]  
Dr. Sam Gonzalez   
   
                      Chloride [Moles/Vol] 105 mmol/L Normal          The   
[facility]  
   
                                        Comment on above:   Performed By: #### T  
SH, CMP, LIPID, CMADM, BNP, T7 ####  
[facility]  
[address] [place] [address]  
Dr. Sam Gonzalez   
   
                      CO2 [Moles/Vol] 27.9 mmol/L Normal     22.0-30.0  [facility]  
   
                                        Comment on above:   Performed By: #### T  
SH, CMP, LIPID, CMADM, BNP, T7 ####  
[facility]  
[address] [address]  
Dr. Sam Gonzalez   
   
                                                    Creatinine   
[Mass/Vol]      0.89 mg/dL      Normal          0.66-1.25       [facility]  
   
                                        Comment on above:   Performed By: #### T  
SH, CMP, LIPID, CMADM, BNP, T7 ####  
[facility]  
[address] [place] [address]  
Dr. Sam Gonzalez   
   
                      EGFR-AF AMERICAN >60        Normal     >=60       [facility]  
   
                                        Comment on above:   Performed By: #### T  
SH, CMP, LIPID, CMADM, BNP, T7 ####  
[facility]  
[address] [address]  
Dr. Sam Gonzalez   
   
                      EGFR-NON AF AMERICAN >60        Normal     >=60       The   
[facility]  
   
                                        Comment on above:   Performed By: #### T  
SH, CMP, LIPID, CMADM, BNP, T7 ####  
[facility]  
[address] [address]  
Dr. Sam Gonzalez   
   
                                                    Globulin (S)   
[Mass/Vol]      3.1 g/dL        Normal                          [facility]  
   
                                        Comment on above:   Performed By: #### T  
SH, CMP, LIPID, CMADM, BNP, T7 ####  
[facility]  
[address] [address]  
Dr. Sam Gonzalez   
   
                      Glucose [Mass/Vol] 92 mg/dL   Normal          [facility]  
   
                                        Comment on above:   Performed By: #### T  
SH, CMP, LIPID, CMADM, BNP, T7 ####  
[facility]  
[address] [address]  
Dr. Sam Gonzalez   
   
                                                    Potassium   
[Moles/Vol]     4.7 mmol/L      Normal          3.4-5.0         [facility]  
   
                                        Comment on above:   Performed By: #### T  
SH, CMP, LIPID, CMADM, BNP, T7 ####  
[facility]  
[address] [address]  
Dr. Sam Gonzalez   
   
                      Protein [Mass/Vol] 6.8 g/dL   Normal     6.1-8.2    [facility]  
   
                                        Comment on above:   Performed By: #### T  
SH, CMP, LIPID, CMADM, BNP, T7 ####  
[facility]  
[address] [address]  
Dr. Sam Gonzalez   
   
                      Sodium [Moles/Vol] 140 mmol/L Normal     137-145    [facility]  
   
                                        Comment on above:   Performed By: #### T  
SH, CMP, LIPID, CMADM, BNP, T7 ####  
[facility]  
[address] [address]  
Dr. Sam Gonzalez   
   
                                                    Urea nitrogen   
[Mass/Vol]      21.0 mg/dL      Critically high 7.0-18.0        [facility]  
   
                                        Comment on above:   Performed By: #### T  
SH, CMP, LIPID, CMADM, BNP, T7 ####  
[facility]  
[address] [address]  
Dr. Sam Gonzalez   
   
                                                    Urea   
nitrogen/Creatinine   
[Mass ratio]    23.6 mg/mg      Normal                          [facility]  
   
                                        Comment on above:   Performed By: #### T  
SH, CMP, LIPID, CMADM, BNP, T7 ####  
[facility]  
[address], [place] [address]  
Dr. Sam Gonzalez   
   
                                                    TSHon 2022   
   
                      TSH        1.798 uIU/mL Normal     0.470-4.680 [facility]  
   
                                        Comment on above:   Performed By: #### T  
SH, CMP, LIPID, CMADM, BNP, T7 ####  
[facility]  
[address] [address]  
Dr. Sam Gonzalez   
   
                      TSH RANGE  SEE BELOW  Normal                [facility]  
   
                                        Comment on above:   Result Comment: <0.3  
4 UIU/ml HYPERTHYROID 0.34-5.60 UIU/ml   
EUTHYROID >5.60 UIU/ml HYPOTHYROID   
   
                                                            Performed By: #### T  
SH, CMP, LIPID, CMADM, BNP, T7 ####  
[facility]  
[address] [address]  
Dr. Sam Gonzalez   
  
  
  
Vital Signs  
  
  
                          Date Time    Vital Sign   Value        Performing   
Clinician                               Facility  
   
                                                    2023   
09:          Body height         170.2 cm            Sarath Montes   
APRN-CNP  
Work Phone:   
[phone]                          [facility]  
   
                                                    2023   
09:                              Body mass index   
(BMI) [Ratio]             27.41 kg/m2               Sarath Montes   
APRN-CNP  
Work Phone:   
[phone]                          [facility]  
   
                                                    2023   
09:56040          Body weight         79.38 kg            Sarath Montes   
APRN-CNP  
Work Phone:   
[phone]                          [facility]  
   
                                                    2023   
09:                              Diastolic blood   
pressure                  62 mm[Hg]                 Sarath Montes   
APRN-CNP  
Work Phone:   
[phone]                          [facility]  
   
                                                    2023   
09:          Heart rate          62 /min             Sarath Montes   
APRN-CNP  
Work Phone:   
[phone]                          [facility]  
   
                                                    2023   
09:                              Systolic blood   
pressure                  108 mm[Hg]                Sarath Montes   
APRN-CNP  
Work Phone:   
[phone]                          [facility]  
   
                                                    2023   
11:          Body height         170.18 cm           Leatha M Hoy  
Work Phone:   
[phone]                          [facility]   
[address]   
DO  
Work Phone:   
[phone]  
   
                                                    2023   
11:                              Body mass index   
(BMI) [Ratio]             27.25 kg/m2               Leatha M Hoy  
Work Phone:   
[phone]                          [facility]   
[address]   
DO  
Work Phone:   
[phone]  
   
                                                    2023   
11:                              Body surface area   
Derived from   
formula                   1.91 m2                   Leatha M Hoy  
Work Phone:   
[phone]                          [facility]   
[address]   
DO  
Work Phone:   
[phone]  
   
                                                    2023   
11:          Body weight         78.93 kg            Leatha M Hoy  
Work Phone:   
[phone]                          [facility]   
[address]   
DO  
Work Phone:   
[phone]  
   
                                                    2023   
11:                              Diastolic blood   
pressure                  74 mm[Hg]                 Leatha M Hoy  
Work Phone:   
[phone]                          [facility]   
[address]   
DO  
Work Phone:   
[phone]  
   
                                                    2023   
11:          Heart rate          62 /min             Leatha M Hoy  
Work Phone:   
[phone]                          [facility]   
[address]   
DO  
Work Phone:   
[phone]  
   
                                                    2023   
11:                              Systolic blood   
pressure                  116 mm[Hg]                Leatha M Hoy  
Work Phone:   
[phone]                          [facility]   
[address]   
DO  
Work Phone:   
[phone]  
   
                                                    2023   
13:          Body height         170.18 cm           Leatha M Hoy  
Work Phone:   
[phone]                          [facility]   
[facility]   
[address] [place]  
Work Phone:   
[phone]  
   
                                                    2023   
13:                              Body mass index   
(BMI) [Ratio]             27.49 kg/m2               Leatha TAMIKO Hoy  
Work Phone:   
[phone]                          [facility]   
[facility]   
[address] [place]  
Work Phone:   
[phone]  
   
                                                    2023   
13:                              Body surface area   
Derived from   
formula                   1.91 m2                   Leatha M Hoy  
Work Phone:   
[phone]                          [facility]   
[facility]   
[address] [place]  
Work Phone:   
[phone]  
   
                                                    2023   
13:          Body weight         79.61 kg            Leatha MORRISON Hoy  
Work Phone:   
[phone]                          [facility]   
[facility]   
[address] [place]  
Work Phone:   
[phone]  
   
                                                    2023   
13:                              Diastolic blood   
pressure                  74 mm[Hg]                 Leatha MORRISON Hoy  
Work Phone:   
[phone]                          [facility]   
[facility]   
[address] [place]  
Work Phone:   
[phone]  
   
                                                    2023   
13:          Heart rate          87 /min             Leatha TAMIKO Hoy  
Work Phone:   
[phone]                          [facility]   
[facility]   
[address] [place]  
Work Phone:   
[phone]  
   
                                                    2023   
13:                              SaO2% (BldA) [Mass   
fraction]                 96 %                      Leatha M Hoy  
Work Phone:   
[phone]                          [facility]   
[facility]   
[address] [place]  
Work Phone:   
[phone]  
   
                                                    2023   
13:                              Systolic blood   
pressure                  115 mm[Hg]                Leatha M Hoy  
Work Phone:   
[phone]                          [facility]   
[facility]   
[address] [place]  
Work Phone:   
[phone]  
   
                                                    2023   
13:                              0 1                 Leatha M Hoy  
Work Phone:   
[phone]                          [facility]   
[facility]   
[address] [place]  
Work Phone:   
[phone]  
   
                                        Comment on above:   [employer]   
   
                                                    2023   
14:          Body height         170.18 cm           Leatha M Hoy  
Work Phone:   
[phone]                          [facility]   
[facility]   
[address] [place]  
Work Phone:   
[phone]  
   
                                                    2023   
14:                              Body mass index   
(BMI) [Ratio]             27.31 kg/m2               Leatha M Hoy  
Work Phone:   
[phone]                          [facility]   
[facility]   
[address] [place]  
Work Phone:   
[phone]  
   
                                                    2023   
14:                              Body surface area   
Derived from   
formula                   1.91 m2                   Leatha M Hoy  
Work Phone:   
[phone]                          [facility]   
[facility]   
[address] [place]  
Work Phone:   
[phone]  
   
                                                    2023   
14:          Body weight         79.1 kg             Leatha M Hoy  
Work Phone:   
[phone]                          [facility]   
[facility]   
[address] [place]  
Work Phone:   
[phone]  
   
                                                    2023   
14:                              Diastolic blood   
pressure                  82 mm[Hg]                 Leatha M Hoy  
Work Phone:   
[phone]                          [facility]   
[facility]   
[address] [place]  
Work Phone:   
[phone]  
   
                                                    2023   
14:          Heart rate          98 /min             Leatha M Hoy  
Work Phone:   
[phone]                          [facility]   
[facility]   
[address] [place]  
Work Phone:   
[phone]  
   
                                                    2023   
14:                              SaO2% (BldA) [Mass   
fraction]                 96 %                      Leatha M Hoy  
Work Phone:   
[phone]                          [facility]   
[facility]   
[address] [place]  
Work Phone:   
[phone]  
   
                                                    2023   
14:                              Systolic blood   
pressure                  136 mm[Hg]                Leatha M Hoy  
Work Phone:   
[phone]                          [facility]   
[facility]   
[address] [place]  
Work Phone:   
[phone]  
   
                                                    2023   
14:                              0 1                 Leathajessica Vasquezy  
Work Phone:   
[phone]                          [facility]   
[facility]   
[address] [place]  
Work Phone:   
[phone]  
   
                                        Comment on above:   [employer]   
   
                                                    2022   
12:          Body height         172.72 cm           Leatha Jansen  
Work Phone:   
[phone]                          [facility]  
in  
Work Phone:   
[phone]  
   
                                                    2022   
12:                              Body mass index   
(BMI) [Ratio]             23.72 kg/m2               Leatha M Dacentec  
Work Phone:   
[phone]                          [facility]  
in  
Work Phone:   
[phone]  
   
                                                    2022   
12:                              Body surface area   
Derived from   
formula                   1.84 m2                   [facility]  
Work Phone:   
[phone]                          [facility]Qdlsixnth[facility]Kkmcm  
in  
Work Phone:   
[phone]  
   
                                                    2022   
12:          Body weight         70.76 kg            [facility]  
Work Phone:   
[phone]                          VI[facility]Ghialgkceg[facility]Jox[facility]  
in  
Work Phone:   
[phone]  
   
                                                    2022   
12:                              Diastolic blood   
pressure                  89 mm[Hg]                 Leatha  Som  
Work Phone:   
[phone]                          [facility]Fnlop[facility]sh[facility]Xscfs  
in  
Work Phone:   
[phone]  
   
                                                    2022   
12:          Heart rate          74 /min             Leatha  Som  
Work Phone:   
[phone]                          UW[facility]Qhnkyxjvcg[facility]Kkxnw  
in  
Work Phone:   
[phone]  
   
                                                    2022   
12:                              SaO2% (BldA) [Mass   
fraction]                 97 %                      Leatha M Dacentec  
Work Phone:   
[phone]                          OM-Gxqecknntn-Tbvne  
in  
Work Phone:   
[phone]  
   
                                                    2022   
12:                              Systolic blood   
pressure                  139 mm[Hg]                Leatha M [employer]  
Work Phone:   
[phone]                          NF-Kiakqlxxgp-Bvqof  
in  
Work Phone:   
[phone]  
   
                                                    2022   
12:                              0 1                 Leatha M Hoy  
Work Phone:   
[phone]                          NX-Bkvdtjftgs-Qnzze  
in  
Work Phone:   
[phone]  
   
                                        Comment on above:   [employer]   
   
                                                    2022   
08:                              SaO2% (BldA) [Mass   
fraction]                 97 %                      MD Leatha Hoy  
Work Phone:   
[phone]                          [facility]  
   
                                                    2022   
10:                              Diastolic blood   
pressure                  72 mm[Hg]                 Leatha M Hoy  
Work Phone:   
[phone]                          [facility]   
[facility]   
DO  
Work Phone:   
[phone]  
   
                                                    2022   
10:                              Systolic blood   
pressure                  120 mm[Hg]                Leatha M Hoy  
Work Phone:   
[phone]                          [facility]   
[facility] [address]   
DO  
Work Phone:   
[phone]  
   
                                                    2022   
10:          Body height         172.72 cm           Leatha M Hoy  
Work Phone:   
[phone]                          [facility]   
[facility] [address]   
DO  
Work Phone:   
[phone]  
   
                                                    2022   
10:                              Body mass index   
(BMI) [Ratio]             25.09 kg/m2               Leatha M Hoy  
Work Phone:   
[phone]                          [facility]   
[facility] [address]   
DO  
Work Phone:   
[phone]  
   
                                                    2022   
10:                              Body surface area   
Derived from   
formula                   1.88 m2                   Leatha M Hoy  
Work Phone:   
[phone]                          [facility]   
[facility]   
DO  
Work Phone:   
[phone]  
   
                                                    2022   
10:          Body weight         74.84 kg            Leatha M Hoy  
Work Phone:   
[phone]                          [facility]   
[facility]   
DO  
Work Phone:   
[phone]  
   
                                                    2022   
10:                              Diastolic blood   
pressure                  70 mm[Hg]                 Leatha M Hoy  
Work Phone:   
[phone]                          [facility]   
[facility]   
DO  
Work Phone:   
[phone]  
   
                                                    2022   
10:          Heart rate          63 /min             Leatha MORRISON Hoy  
Work Phone:   
[phone]                          [facility]   
[facility] [address]   
DO  
Work Phone:   
[phone]  
   
                                                    2022   
10:                              Systolic blood   
pressure                  126 mm[Hg]                Leatha MORRISON Hoy  
Work Phone:   
[phone]                          [facility]   
[facility] [address]   
DO  
Work Phone:   
[phone]  
   
                                                    2022   
15:                              45 1                Leatha MORRISON Hoy  
Work Phone:   
[phone]                          [facility]   
[facility]   
DO  
Work Phone:   
[phone]  
   
                                        Comment on above:   [id number]   
   
                                                    2022   
13:          Body height         170.18 cm           MD Leatha Jansen  
Work Phone:   
[phone]                          [facility]  
   
                                                    2022   
13:          Body weight         74.84 kg            MD Leatha Jansen  
Work Phone:   
[phone]                          [facility]  
   
                                                    2022   
13:                              Diastolic blood   
pressure                  98 mm[Hg]                 MD Leatha Jansen  
Work Phone:   
[phone]                          [facility]  
   
                                                    2022   
13:          Heart rate          74 /min             MD Leatha Jansen  
Work Phone:   
[phone]                          [facility]  
   
                                                    2022   
13:                              Systolic blood   
pressure                  149 mm[Hg]                MD Leatha Jansen  
Work Phone:   
[phone]                          [facility]  
  
  
  
Encounters  
  
  
                          Encounter Date Encounter Type Care Provider Facility  
   
                                                    Start: 2023  
End: 2023     ambulatory          SARATH MONTES       [facility]  
   
Ambulatory  
   
                                                    Start: 2023  
End: 2023                         Office outpatient visit   
15 minutes                              Sarath Montes   
APRN-CNP  
Work Phone:   
[phone]                          [facility]  
   
                                        Comment on above:   Paroxysmal atrial fi  
brillation (CMS/HCC) (Primary Dx);  
Long term current use of anticoagulant therapy;  
Nonischemic cardiomyopathy (CMS/HCC);  
Overweight with body mass index (BMI) of 27 to 27.9 in adult   
   
                                Start: 2023 ambulatory      Dr. Leatha Jansen                                     Facility:  
   
                                        Start: 2023   Office outpatient vi  
sit   
25 minutes                              Leatha Jansen  
Work Phone:   
[phone]                          [facility] [address] DO  
Work Phone:   
[phone]  
   
                                Start: 2023 Rx Renewal      Leatha TAMIKO Jansen  
Work Phone:   
[phone]                          [facility] [address] DO  
Work Phone:   
[phone]  
   
                                Start: 2023 ambulatory      Dr. Leatha Jansen                                     Facility:[facility]  
   
                                        Start: 2023   Current tobacco non-  
user   
cad cap copd pv dm                      Leatha Jansen  
Work Phone:   
[phone]                          [facility]   
[facility] [address] [place]  
Work Phone:   
[phone]  
   
                                Start: 2023 ambulatory      Dr. Leatha Jansen                                     Facility:  
   
                                Start: 2023 Chart Update    Leatha M Som  
Work Phone:   
[phone]                          [facility]   
[facility] [address] DO  
Work Phone:   
[phone]  
   
                                                    Start: 2023  
End: 2023     ambulatory          LAURA Murphy     Facility:[facility]  
   
                          Start: 2023 ambulatory   Sivan Schuler Facility:  
[facility]  
   
                                        Start: 2023   Current tobacco non-  
user   
cad cap copd pv dm                      Leatha TAMIKO Jansen  
Work Phone:   
[phone]                          [facility]   
[facility] [address] [place]  
Work Phone:   
[phone]  
   
                                        Start: 2023   Patient encounter   
procedure                               Leatha Jansen  
Work Phone:   
[phone]                          [facility]   
[facility] [address] [place]  
Work Phone:   
[phone]  
   
                                                    Start: 2022  
End: 2022           ambulatory                Dr. Donna Hernandes                         Facility:[facility]  
   
                                                    Start: 2022  
End: 2022     ambulatory          DR LEATHA JANSEN      Facility:  
   
                                                    Start: 2022  
End: 2022           ambulatory                Dr. Donna Hernandes                         Facility:[facility]  
   
                                Start: 2022 AUDIT           Leatha Jansen  
Work Phone:   
[phone]                          [facility]   
for Perioperative Med  
Work Phone:   
[phone]  
   
                                Start: 2022 ambulatory      Dr. Donna Hernandes                         Facility:[facility]  
   
                                        Start: 2022   Encounter for   
preprocedural laboratory   
examination                             Dr. Donna Hernandes                         [facility]  
   
                                Start: 2022 Message         Leatha Jansen  
Work Phone:   
[phone]                          [facility]   
[facility] [address] DO  
Work Phone:   
[phone]  
   
                                                    Start: 2022  
End: 10-     ambulatory          SHAIKH LUIS ARMANDO ROCK     Facility:[facility]  
   
                                        Start: 2022   Current tobacco non-  
user   
cad cap copd pv dm                      Leatha Jansen  
Work Phone:   
[phone]                          [facility]  
Work Phone:   
[phone]  
   
                                Start: 2022 ambulatory      Dr. Leatha Jansen                                     Facility:[facility]  
   
                                                    Start: 2022  
End: 2022     ambulatory          SHAIKH LUIS ARMANDO ROCK     Facility:[facility]  
   
                                                    Start: 2022  
End: 2022     ambulatory          DR LEATHA JANSEN      Facility:[facility]  
   
                                Start: 2022 Telephone encounter Leatha Jansen  
Work Phone:   
[phone]                          [facility]   
[facility] [address] DO  
Work Phone:   
[phone]  
   
                                                    Start: 2022  
End: 2022     ambulatory          SHAIKH LUIS ARMANDO ROCK     Facility:[facility]  
   
                                Start: 2022 Chart Update    Leatha Jansen  
Work Phone:   
[phone]                          [facility]   
[facility] [address] DO  
Work Phone:   
[phone]  
   
                                Start: 2022 Chart Update    Leatha Jansen  
Work Phone:   
[phone]                          [facility]   
[facility] [address] DO  
Work Phone:   
[phone]  
   
                                                    Start: 2022  
End: 2022     ambulatory          LAURA Murphy     Facility:[facility]  
   
                                                    Start: 2022  
End: 2022                         Admission to same day   
surgery center                          MD Leatha Jansen  
Work Phone:   
[phone]                          [facility]   
Ctr-Electrodiagnostics  
   
                                                    Start: 2022  
End: 2022     ambulatory          LAURA Murphy     Facility:[facility]  
   
                                                    Start: 2022  
End: 2022                         Patient encounter   
procedure                               MD Leatha Jansen  
Work Phone:   
[phone]                          [facility]   
Testing  
   
                                                    Start: 06-  
End: 06-     ambulatory          DR LEATHA JANSEN      Facility:[facility]  
   
                                                    Start: 2022  
End: 2022     ambulatory          SHAIKH LUIS ARMANDO ROCK     Facility:[facility]  
   
                                Start: 2022 ambulatory      Dr. Leatha Jansen                                     Facility:  
   
                                        Start: 2022   Office consultation   
new/estab patient 80 min                Leatha Jansen  
Work Phone:   
[phone]                          [facility]   
[facility] [address] DO  
Work Phone:   
[phone]  
   
                                                    Start: 2022  
End: 2022     ambulatory          LAURA Murphy     Facility:[facility]  
   
                                                    Start: 2022  
End: 2022                         Patient encounter   
procedure                               MD Leatha Jansen  
Work Phone:   
[phone]                          [facility]-Nuc Med   
[facility]  
   
                          Start: 2022 ambulatory   DR LEATHA JANSEN Facility  
:[facility]  
   
                                                    Start: 2022  
End: 2022     ambulatory          SHAIKH LUIS ARMANDO ROCK     Facility:[facility]  
   
                          Start: 2022 ambulatory   DR LEATHA JANSEN Facility  
:[facility]  
   
                                                    Start: 2022  
End: 2022     ambulatory          DR LEATHA JANSEN      Facility:[facility]  
   
                                                    Start: 2022  
End: 2022     ambulatory          DR LEATHA JANSEN      Facility:[facility]  
   
                                                    Start: 2022  
End: 2022     ambulatory          DR LEATHA JANSEN      Facility:[facility]  
  
  
  
Procedures  
  
  
                          Date         Procedure    Procedure Detail Performing   
Clinician  
   
                          Start: 2023 Echocardiography              Linda Jasnen  
Work Phone:   
[phone]  
   
                          Start: 2022 SARS Antigen (LFIA)              MD Leatha Jansen  
Work Phone:   
[phone]  
   
                                        Start: 2022   Radionuclide myocard  
ial   
perfusion stress study                              MD Leatha Jansen  
Work Phone:   
[phone]  
   
                                                            Catheter ablation of  
   
arrhythmogenic focus                                Leatha MORRISON Som  
Work Phone:   
[phone]  
   
                                       Operation on mouth              Leatha MORRISON  
 Som  
Work Phone:   
[phone]  
   
                                       Procedure on middle ear              Marco MORRISON Som  
Work Phone:   
[phone]  
   
                                       Tonsillectomy              Leatha Vasquezneisha  
Work Phone:   
[phone]  
  
  
  
Plan of Treatment  
  
  
                          Date         Care Activity Detail       Author  
   
                          Start: 2024 Echocardiography Echocardiogram [facility]  
   
                                                    Start: 2024  
End: 2024                         Patient encounter   
procedure                               2024 9:30 AM EDT   
Office Visit [facility] [address]   
[address] [place]   
[address] [phone]   
Agustín Murphy DO   
[address]   
[address] [place]   
[address] [phone]   
(Work) [phone]   
(Fax)                                   [facility]  
   
                          Start: 2023 Creatinine measurement Creatinine Le  
yaw [facility]  
   
                          Start: 2023 Potassium measurement Potassium Leve  
l [facility]  
   
                                                    Start: 2023  
End: 2024                         Basic metabolic 2000 panel   
- Serum or Plasma                       Basic Metabolic Panel   
Lab Routine Paroxysmal   
atrial fibrillation   
(CMS/HCC) Long term   
current use of   
anticoagulant therapy   
Nonischemic   
cardiomyopathy   
(CMS/HCC) Expected:   
2023   
(Approximate), Expires:   
2024                              [facility]  
Work Phone:   
[phone]  
   
                                        Comment on above:   Expected: 2023  
 (Approximate), Expires: 2024   
   
                                        Start: 2023   FUV, Provider: Sarath Martinez, Status: Pen,   
Time: 9:30 AM                           FUV, Provider: Sarath Martinez, Status:   
Pen, Time: 9:30 AM                      [facility]   
[facility] [address]   
DO  
Work Phone:   
[phone]  
   
                          Start: 2023 Influenza vaccination Influenza Vacc  
ine (#1) [facility]  
   
                                        Start: 2023   ECHO, Provider: GARCIA CHEUNG   
[facility] ULTRASOUND   
[id number], Status:   
Pen, Time: 8:45 AM                      ECHO, Provider:   
LETA [facility]   
ULTRASOUND   
[id number], Status:   
Pen, Time: 8:45 AM                      [facility] [address]   
DO  
Work Phone:   
[phone]  
   
                                        Start: 2023   FUV, Provider:   
Agustín Murphy, Status:   
Pen, Time: 10:40 AM                     FUV, Provider:   
Agustín Murphy,   
Status: Pen, Time:   
10:40 AM                                [facility]   
[facility]   
[address] [place]  
Work Phone:   
[phone]  
   
                                        Start: 2023   FUV, Provider:   
Sivan Schuler, Status:   
Pen, Time: 1:00 PM                      FUV, Provider:   
Sivan Schuler, Status:   
Pen, Time: 1:00 PM                      [facility]   
[facility]   
[address] [place]  
Work Phone:   
[phone]  
   
                                        Start: 2023   SURGNONUH, Provider:  
   
George Leonard, Status:   
Pen, Time: 11:00 AM                     SURGNONUH, Provider:   
George Leonard, Status:   
Pen, Time: 11:00 AM                     [facility]   
[facility]   
[address] [place]  
Work Phone:   
[phone]  
   
                                        Start: 2022   NPV, Provider:   
Donna Hernandes, Status:   
Pen, Time: 12:30 PM                     NPV, Provider:   
Donna Hernandes,   
Status: Pen, Time:   
12:30 PM                                [facility]  
Work Phone:   
[phone]  
   
                                        Start: 2022   NPV, Provider:   
Donna Hernandes, Status:   
Pen, Time: 10:00 AM                     NPV, Provider:   
Donna Hernandes,   
Status: Pen, Time:   
10:00 AM                                [facility]  
Work Phone:   
[phone]  
   
                                        Start: 2022   FUV, Provider: Sarath Martinez, Status: Pen,   
Time: 1:30 PM                           FUV, Provider: Sarath Martinez, Status:   
Pen, Time: 1:30 PM                      [facility]   
DO  
Work Phone:   
[phone]  
   
                                        Start: 2022   [facility], Provider:  
   
George Leonard, Status:   
Pen, Time: 2:00 PM                      [facility], Provider:   
George Leonard, Status:   
Pen, Time: 2:00 PM                      [facility] [address]   
DO  
Work Phone:   
[phone]  
   
                                        Start: 2022   Radionuclide myocard  
ial   
perfusion stress study                  NM david perf SPECT rest   
& str                                   [facility]  
   
                                        Start: 2022   COVID-19 Vaccine (5   
-   
Mixed Product series)                   COVID-19 Vaccine (5 -   
Mixed Product series)                   [facility]  
   
                                        Start: 2021   Pneumococcal Vaccine  
: 65+   
Years (2 - PPSV23 or   
PCV20)                                  Pneumococcal Vaccine:   
65+ Years (2 - PPSV23   
or PCV20)                               [facility]  
   
                                Start: 09- Zoster Vaccines (1 of 2) Zoste  
r Vaccines (1 of   
2)                                      [facility]  
   
                                        Start: 09-   DTaP/Tdap/Td Vaccine  
s (1 -   
Tdap)                                   DTaP/Tdap/Td Vaccines   
(1 - Tdap)                              [facility]  
   
                                        Start: 09-   Hepatitis A Vaccines  
 (1 of   
2 - Risk 2-dose series)                 Hepatitis A Vaccines (1   
of 2 - Risk 2-dose   
series)                                 [facility]  
   
                                        Start: 09-   Diabetes mellitus   
screening                 Diabetes Screening        [facility]  
   
                          Start: 1945 Lipid panel  Lipid Panel  [facility]  
   
                                        Start: 1945   Medicare Annual Well  
ness   
Visit                                   Medicare Annual   
Wellness Visit (AWV)                    [facility]  
  
  
  
Immunizations  
  
  
                      Immunization Date Immunization Notes      Care Provider Alem quinn  
   
                                        11-          Pfizer-BioNT[employer]uit   
COVID-19 Vacc 30   
MCG/0.3ML Intramuscular   
Suspension                                          Leahta Jansen  
Work Phone:   
[phone]                          [facility] [address]   
DO  
Work Phone:   
[phone]  
   
                                        10-          influenza, seasonal,  
   
injectable                                          Leatha Jansen  
Work Phone:   
[phone]                          [facility]  
n  
Work Phone:   
[phone]  
   
                                        10-          pneumococcal conjuga  
te   
vaccine, 13 valent                                  Leatha Jansen  
Work Phone:   
[phone]                          [facility]  
n  
Work Phone:   
[phone]  
   
                                        10-          influenza virus   
vaccine, unspecified   
formulation                                         Sarath Montes   
APRN-CNP  
Work Phone:   
[phone]                          [facility]  
Work Phone:   
[phone]  
   
                                        2021          Pfizer-BioNTech   
COVID-19 Vacc 30   
MCG/0.3ML Intramuscular   
Suspension                                          Leatha Jansen  
Work Phone:   
[phone]                          [facility] [address]   
DO  
Work Phone:   
[phone]  
   
                                        2021          Pfizer-BioNTech   
COVID-19 Vacc 30   
MCG/0.3ML Intramuscular   
Suspension                                          Leatha Jansen  
Work Phone:   
[phone]                          [facility]   
DO  
Work Phone:   
[phone]  
   
                                        2020          Moderna COVID-19   
Vaccine 100 MCG/0.5ML   
Intramuscular   
Suspension                                          Leatha Jansen  
Work Phone:   
[phone]                          [facility]  
n  
Work Phone:   
[phone]  
  
  
  
Payers  
  
  
                          Date         Payer Category Payer        Policy ID  
   
                          2022   Self-pay                  [id number]  
[id number]  
[id number]  
   
                                2008      Medicare        MEDICARE MEDICAR  
E PART A AND   
B [id number]   
2008-Present [address]   
[address] [place] [address]             [id number]  
[id number]  
   
                          1960   Medicare                  [id number]   
[id number]  
[id number]  
   
                          1960   Self-pay                  [id number]  
   
                          1960   Unknown                   [id number]   
[id number]  
[id number]  
   
                          1943   Unknown                   [id number]   
[id number]  
   
                          1943   Unknown                   [id number]   
[id number]  
   
                          1943   Unknown                   [id number]   
[id number]  
   
                          1943   Unknown                   [id number]   
[id number]  
   
                          1943   Unknown                   [id number]   
[id number]  
   
                          1943   Unknown                   [id number]   
[id number]  
   
                          1943   Unknown                   [id number]   
[id number]  
   
                          1943   Unknown                   [id number]   
[id number]  
   
                          1943   Unknown                   [id number]   
[id number]  
   
                          1943   Unknown                   [id number]   
[id number]  
   
                          1943   Unknown                   [id number]   
[id number]  
   
                          1943   Unknown                   [id number]   
[id number]  
   
                          1943   Unknown                   [id number]   
[id number]  
   
                          1943   Unknown                   [id number]   
[id number]  
   
                          1943   Unknown                   [id number]   
[id number]  
   
                          1943   Unknown                   [id number]   
[id number]  
   
                          1943   Unknown                   [id number]   
[id number]  
   
                          1943   Unknown                   [id number]   
[id number]  
   
                          1943   Unknown                   [id number]   
[id number]  
   
                          1943   Unknown                   [id number]   
[id number]  
   
                          1943   Unknown                   [id number]   
[id number]  
   
                          1943   Unknown                   [id number]   
[id number]  
   
                          1943   Unknown                   [id number]   
[id number]  
   
                                       Unknown                     
   
                                       Unknown                   [id number]   
[id number]  
   
                                       Unknown                   [id number]   
[id number]  
   
                                       Unknown                   [id number]   
[id number]  
   
                                       Unknown                   [id number]   
[id number]  
  
  
  
Social History  
  
  
                          Date         Type         Detail       Facility  
   
                                                            Tobacco smoking stat  
RUSTIS                      Unknown if ever smoked    [facility]  
Work Phone:   
[phone]  
   
                          Start: 1945 Sex Assigned At Birth Male         F  
[facility]  
   
                                        Start: 2023   Consumes alcohol   
occasionally                            Consumes alcohol   
occasionally                            [facility]   
[facility] [address] DO  
Work Phone:   
[phone]  
   
                                        Comment on above:   2 cups of coffee del  
ly, pop three times weekly;   
   
                                                            oxycontin;   
   
                                                            quit somking at age   
34;   
   
                                                            oxycodone;   
   
                                        Start: 2023   Tobacco smoking stat  
[place]                      Ex-smoker                 [facility]  
   
                                       History of tobacco use Current smoker [facility]  
Work Phone:   
[phone]  
   
                                       History of tobacco use Cigarette Smoker U  
[facility]  
Work Phone:   
[phone]  
   
                                        Start: 2023   Tobacco use and   
exposure                                Smokeless tobacco   
non-user                                [facility]  
Work Phone:   
[phone]  
   
                                Start: 2023 Alcohol intake  Lifetime non-d  
valentin   
(finding)                               [facility]  
Work Phone:   
[phone]  
   
                          Start: 2023 Tobacco use panel              [facility]  
Work Phone:   
[phone]  
   
                          Start: 1945 Sex Assigned At Birth Not on file  U  
[facility]  
Work Phone:   
[phone]  
   
                                                    Start: 10-  
End: 2023                         Exposure to SARS-CoV-2   
(event)                   Not sure                  [facility]  
  
  
  
Clinical Notes 2022 to 2023  
     Assessment & Plan Note - DANG Mullins - 2023 12:24 PM   
EDTAssessment & Plan Note - DANG Mullins - 2023 12:24 PM 
EDTPatient Instructions  
  
                                Note Date & Type Note            Facility  
   
                                                    2023 Evaluation +   
Plan note                               Associated Problem(s): Overweight   
with body mass index (BMI) of 27 to   
27.9 in adult  
Formatting of this note might be   
different from the original.  
Reviewed the merits of healthy   
lifestyle choices on overall   
cardiovascular health.  
  
Electronically signed by DANG Mullins at 2023 12:24   
PM EDT  
                                        [facility]  
Work Phone:   
[phone]  
   
                                                    2023 Evaluation +   
Plan note                               Associated Problem(s): Long term   
current use of anticoagulant   
therapy  
Formatting of this note might be   
different from the original.  
CHADS VASc 4 anticoagulated full   
dose Xarelto  
Currently denies bleeding diatheses  
Needs annual labs for creatinine  
Electronically signed by DANG Mullins at 2023 12:24   
PM EDT  
                                        [facility]  
Work Phone:   
[phone]  
   
                                                    2023 Evaluation +   
Plan note                               Associated Problem(s): Cardiac and   
Vasculature  
Formatting of this note might be   
different from the original.  
May 2022 MPI no ischemia  
Daily activity greater than 4 METS   
without concerning symptoms  
Electronically signed by DANG Mullins at 2023 12:24   
PM EDT  
                                        [facility]  
Work Phone:   
[phone]  
   
                                                    2023 Miscellaneous   
Notes                                   Associated Problem(s): Overweight   
with body mass index (BMI) of 27 to   
27.9 in adult  
Formatting of this note might be   
different from the original.  
Reviewed the merits of healthy   
lifestyle choices on overall   
cardiovascular health.  
  
Electronically signed by DANG Mullins at 2023 12:24   
PM EDT  
Associated Problem(s): Long term   
current use of anticoagulant   
therapy  
Formatting of this note might be   
different from the original.  
CHADS VASc 4 anticoagulated full   
dose Xarelto  
Currently denies bleeding diatheses  
Needs annual labs for creatinine  
Electronically signed by DANG Mullins at 2023 12:24   
PM EDT  
Associated Problem(s): Cardiac and   
Vasculature  
Formatting of this note might be   
different from the original.  
May 2022 MPI no ischemia  
Daily activity greater than 4 METS   
without concerning symptoms  
Electronically signed by DANG Mullins at 2023 12:24   
PM EDT  
Associated Problem(s): Nonischemic   
cardiomyopathy (CMS/HCC)  
Formatting of this note might be   
different from the original.  
May 2022 TTE EF 40-45% (afib)  
  
May 2023 TTE  
LVEF 50-55%  
LA mild/moderate  
RA mod/severe dilated  
MR mild  
RVSP 36  
  
To continue GDMT  
Electronically signed by DANG Mullins at 2023 12:23   
PM EDT  
Associated Problem(s): Atrial   
fibrillation (CMS/HCC)  
Formatting of this note might be   
different from the original.  
Dec 2022 RFA ablation  
2023 DCC  
Denies recurrent symptoms  
Electronically signed by DANG Mullins at 2023 12:22   
PM EDT  
documented in this encounter            [facility]  
Work Phone:   
[phone]  
   
                                                    2023 Evaluation +   
Plan note                               Associated Problem(s): Nonischemic   
cardiomyopathy (CMS/HCC)  
Formatting of this note might be   
different from the original.  
May 2022 TTE EF 40-45% (afib)  
  
May 2023 TTE  
LVEF 50-55%  
LA mild/moderate  
RA mod/severe dilated  
MR mild  
RVSP 36  
  
To continue GDMT  
Electronically signed by DANG Mullins at 2023 12:23   
PM T  
                                        [facility]  
Work Phone:   
[phone]  
   
                                                    2023 Evaluation +   
Plan note                               Associated Problem(s): Atrial   
fibrillation (CMS/HCC)  
Formatting of this note might be   
different from the original.  
Dec 2022 RFA ablation  
2023 DCC  
Denies recurrent symptoms  
Electronically signed by DANG Mullins at 2023 12:22   
PM EDT  
                                        [facility]  
Work Phone:   
[phone]  
   
                                                    2023 History of   
Present illness Narrative               Formatting of this note is   
different from the original.  
Chief Complaint  
 Doing just fine   
  
Reason for Visit  
Routine 6-month follow-up  
Patient presents to the office   
today for outpatient follow-up for   
atrial fibrillation,   
anticoagulation, cardiomyopathy.  
Last evaluated in clinic by Dr. Murphy May 2023.  
  
Presents today ambulatory with   
steady gait.  
Accompanied by spouse  
Patient denies any hospitalizations   
or significant changes to interval   
medical history since last office   
follow-up.  
No routine PCP follow-up, no labs   
this year.  
  
History of Present Illness  
Patient is an extremely pleasant   
78-year-old gentleman who presents   
without voice cardiovascular   
complaints. Remains aerobically   
active where he walks 2 miles   
daily, over the summer was able to   
do yard work, he has stairs that he   
needs to go up and down at home. He   
denies any type of exertional   
complaints. He ambulated in from   
the parking lot without concerns.  
  
He reports being highly symptomatic   
in atrial fibrillation and denies   
any recurrence.  
Remains anticoagulated. Need to   
check renal function for creatinine   
clearance.  
  
Improved LVEF 50 to 55%. Discussed   
rationale behind remaining on GDMT.  
  
Patient reports that overall has no   
complaint(s) of chest pain,   
exertional chest   
pressure/discomfort, irregular   
heart beat, and palpitations  
  
Daily activity: aerobically active,   
walks 2 miles  
Denies any change in exercise   
capacity or functional tolerance   
since last office visit.  
  
Overall patient is pleased with   
current state of cardiovascular   
health. At this time there are no   
indications for additional   
cardiovascular testing or need for   
medication changes.  
  
Discussed the dynamic nature of   
coronary artery disease and the   
importance of seeking medical   
attention if new symptoms arise.  
  
Review of Systems  
Cardiovascular: Negative for chest   
pain, dyspnea on exertion,   
irregular heartbeat, leg swelling,   
near-syncope, orthopnea,   
palpitations, paroxysmal nocturnal   
dyspnea and syncope.  
  
  
Visit Vitals  
/62 (BP Location: Right arm,   
Patient Position: Sitting)  
Pulse 62  
Ht 1.702 m (5' 7 )  
Wt 79.4 kg (175 lb)  
BMI 27.41 kg/m  
Smoking Status Former  
BSA 1.94 m  
  
Physical Exam  
Vitals and nursing note reviewed.  
Constitutional:  
Appearance: Normal appearance.  
Cardiovascular:  
Rate and Rhythm: Normal rate and   
regular rhythm.  
Heart sounds: Normal heart sounds.  
Pulmonary:  
Effort: Pulmonary effort is normal.  
Breath sounds: Normal breath   
sounds.  
Musculoskeletal:  
Cervical back: Full passive range   
of motion without pain.  
Right lower leg: No edema.  
Left lower leg: No edema.  
Skin:  
General: Skin is cool.  
Neurological:  
Mental Status: He is alert and   
oriented to person, place, and   
time.  
Psychiatric:  
Attention and Perception: Attention   
normal.  
Mood and Affect: Mood normal.  
Behavior: Behavior is cooperative.  
  
  
No Known Allergies  
  
Current Outpatient Medications  
Medication Instructions  
carvedilol (COREG) 3.125 mg, oral,   
2 times daily  
losartan (Cozaar) 25 mg tablet 1   
tablet, oral, Daily  
rivaroxaban (XARELTO) 20 mg, oral,   
Daily, Take with food.  
spironolactone (ALDACTONE) 25 mg,   
oral, Daily  
  
Assessment:  
  
Atrial fibrillation (CMS/HCC)  
Dec 2022 RFA ablation  
2023 DCC  
Denies recurrent symptoms  
  
Nonischemic cardiomyopathy   
(CMS/HCC)  
May 2022 TTE EF 40-45% (afib)  
  
May 2023 TTE  
LVEF 50-55%  
LA mild/moderate  
RA mod/severe dilated  
MR mild  
RVSP 36  
  
To continue GDMT  
  
Cardiac and Vasculature  
May 2022 MPI no ischemia  
Daily activity greater than 4 METS   
without concerning symptoms  
  
Long term current use of   
anticoagulant therapy  
CHADS VASc 4 anticoagulated full   
dose Xarelto  
Currently denies bleeding diatheses  
Needs annual labs for creatinine  
  
Overweight with body mass index   
(BMI) of 27 to 27.9 in adult  
Reviewed the merits of healthy   
lifestyle choices on overall   
cardiovascular health.  
  
Plan:  
  
Through informed decision making   
process incorporating patients   
unique circumstances, the following   
treatment plan will be initiated:  
  
1. Prescription drug management of   
cardiovascular medication for   
efficacy, adherence to treatment,   
side effect assessment and   
polypharmacy. Current treatment   
clinically warranted and to   
continue without modifications.  
  
2. Chem6  
  
3. Return for follow-up; in the   
interim, contact the office if new   
symptoms arise.  
Dr. Murphy 6 months  
  
Sarath Montes MSN, APRN-CNP,   
PMHNP-[facility]  
  
Please excuse any errors in grammar   
or translation related to this   
dictation. Voice recognition   
software was utilized to prepare   
this document.  
  
Electronically signed by DANG Mullins at 2023 12:29   
PM EDT  
documented in this encounter            [facility]  
Work Phone:   
[phone]  
   
                                        2023 Instructions   
  
  
DAGN Mullins -   
2023 9:30 AM EDTFormatting of   
this note might be different from   
the original.  
Please bring all medicines,   
vitamins, and herbal supplements   
with you when you come to the   
office.  
  
Prescriptions will not be filled   
unless you are compliant with your   
follow up appointments or have a   
follow up appointment scheduled as   
per instruction of your physician.   
Refills should be requested at the   
time of your visit.  
  
PLAN:  
Through informed decision making   
process incorporating patients   
unique circumstances, the following   
treatment plan will be initiated:  
  
1. Prescription drug management of   
cardiovascular medication for   
efficacy, adherence to treatment,   
side effect assessment and   
polypharmacy. Current treatment   
clinically warranted and to   
continue without modifications.  
  
2. Chem6  
  
3. Return for follow-up; in the   
interim, contact the office if new   
symptoms arise.  
Dr. Murphy 6 months  
  
Electronically signed by DANG Mullins at 2023 10:11   
AM EDT  
  
documented in this encounter            [facility]  
Work Phone:   
[phone]  
   
                                        2022 Note     History of Present I  
llness:  
History Present Illness:  
Reason for surgery: Afib ablation  
HPI:  
81 year old with at night of   
rotator cuff, and cocaine use in   
2017 , oxycodone  
abuse stopped 6 months ago, was   
diagnosed with NICM with ef of 45%,   
in sitting  
of afib , ( tachymedicated   
cardiomyopathy)  
  
Treatment of his AF includes   
carvedilol. Pt has not required   
DCCV or AADs.  
Symptoms of his AF include   
palpitations and DELEON.  
  
Pt recently followed up with his   
PCP in the end of April/early May   
and pt was  
noted to be in AF. Pt had a history   
of irregular heartbeat but no   
diagnosis of  
AF. Pt was started on Eliquis.  
  
Pt established with Dr Murphy with   
the plan for DCCV. His DCCV was   
cancelled  
because pt had COVID. Pt is not on   
ay medications for rate control. Pt   
has  
stopped all his medications almost   
1 week ago including his Eliquis.   
pt has a  
history of Oxycodone abuse which he   
was started on initially in    
for a  
rotator cuff injury. Pt has been   
obtaining the medications on the   
street not  
through prescription. Pt has a   
recent accidental overdose which   
required ED  
visit an reversal with Narcan. Pt   
was take to rehab by his daughter   
about 1  
week ago but left the same day.  
  
  
The indications, risks, benefits,   
alternatives, and details of the   
procedure  
were explained to the patient who   
expressed understanding of the   
risks  
including but are not limited to:   
pain, bleeding, and infection for   
which the  
risk is less than 1%. More serious   
risks, including cardiac   
perforation and  
tamponade, vascular trauma,   
pulmonary vein stenosis,   
atrio-esophageal fistula,  
diaphragmatic paralysis,   
life-threatening arrhythmia, need   
for permanent  
pacemaker, stroke, heart attack,   
and/or death, for which the overall   
risk  
ranges 0.1-1%. After all questions   
were answered, the patient provided   
informed  
and written consent.  
  
patient had Afib abd CTI line   
ablation , with no complication ,   
he will be  
admitted because it is a late case.   
will need to get his AC tonight ,   
AC  
can't be held without calling EP   
fellow on call.  
  
Allergies:  
Allergies:  
No Known Allergies:  
  
Home Medication Review:  
Home Medications Reviewed: yes  
  
Impression/Procedure:  
Impression and Planned Procedure:   
afib ablation  
  
  
ERAS (Enhanced Recovery After   
Surgery):  
ERAS Patient: no  
  
  
Physical Exam by System:  
  
Respiratory/Thorax: Patent airways,   
CTAB, normal breath sounds with   
good chest  
expansion, thorax symmetric  
Cardiovascular: Regular, rate and   
rhythm, no murmurs, 2+ equal pulses   
of the  
extremities, normal S 1and S 2  
  
Airway/Sedation Assessment:  
Assmentment by AnesthesiaSee   
anesthesia airway/sedation   
assessment.  
  
Consent:  
COVID-19 Consent:  
COVID-19 Risk ConsentSurgeon has   
reviewed key risks related to the   
risk of  
stan COVID-19 and if they   
contract COVID-19 what the risks   
are.  
  
Coronavirus Attestation of Need for   
Surgery:  
COVID-19 Surgery / Procedure   
Attestation: Select all criteria   
that applyRisk  
of metastasis or progression of   
staging if delayed  
  
Attestation:  
Note Completion:  
I am a: Resident/Fellow  
Attending AttestationI saw and   
evaluated the patient. I personally   
obtained  
the key and critical portions of   
the history and physical exam or   
was  
physically present for key and   
critical portions performed by the  
resident/fellow. I reviewed the   
resident/fellows documentation and   
discussed  
the patient with the   
resident/fellow. I agree with the   
resident/fellows  
medical decision making as   
documented in the note.  
I personally evaluated the patient   
on13-Dec-2022  
  
  
  
Electronic Signatures:  
Donna Hernandes) (Signed   
23-Dec-2022 09:05)  
Co-Signer: History of Present   
Illness, Allergies, Home Medication   
Review,  
Impression/Procedure, ERAS,   
Physical Exam, Consent, Note   
Completion  
Jessica Johnson (Resident))   
(Signed 13-Dec-2022 18:57)  
Authored: History of Present   
Illness, Allergies, Home Medication   
Review,  
Impression/Procedure, ERAS,   
Physical Exam, Consent, Note   
Completion  
  
  
Last Updated: 23-Dec-2022 09:05 by   
Donna Hernandes)                   [facility]  
   
                                        2022 Note     Pre-procedure Verifi  
cation and Time   
Out:  
Pre-Procedure Verification and Time   
Out:  
Procedure Locationprocedure area  
HUDDLE - Pre-procedure   
Verificationcompleted  
TIME OUT - Final   
Verificationcompleted immediately   
prior to procedure start  
DEBRIEFcompleted  
  
General Information:  
  
Anesthesia Critical Care:   
Non-Anesthesia  
Date/Time of Procedure: 13-Dec-2022   
18:40  
Post-Procedure Diagnosis:   
successful afib ablation  
successful CTI line ablation  
Procedure Name: Afib ablation  
Findings: grossly normal anatomy  
Procedure performed by: Dr. Hernandes  
Assistant(s): Jessica johnson  
Estimated Blood Loss (mL): 10  
Specimen: no  
Informed Consent: written consent   
obtained  
  
Procedure Details:  
  
Procedure Details:  
Radiofrequency ablation for atrial   
fibrillation  
  
The indications, risks, benefits,   
alternatives, and details of the   
procedure  
were explained to the patient who   
expressed understanding of the   
risks  
including but are not limited to:   
pain, bleeding, and infection for   
which the  
risk is less than 1%. More serious   
risks, including cardiac   
perforation and  
tamponade, vascular trauma,   
pulmonary vein stenosis,   
atrio-esophageal fistula,  
diaphragmatic paralysis,   
life-threatening arrhythmia, need   
for permanent  
pacemaker, stroke, heart attack,   
and/or death, for which the overall   
risk  
ranges 0.1-1%. After all questions   
were answered, the patient provided   
informed  
and written consent.  
  
The patient presented to the EP lab   
in the fasting state and was in   
Afib. The  
patient was prepped and draped per   
usual sterile protocol. Access was   
obtained  
in the right femoral vein using the   
modified Seldinger technique with   
the aid  
of ultrasound. Three sheaths (8Fr,   
8.5Fr, 8.5Fr) were placed in the   
right  
femoral vein. Intracardiac echo was   
used to evaluate for any baseline   
effusion.  
There was no pericardial effusion   
at baseline. A Split Duodeca was   
placed in  
the HRA and CS. The proximal sheath   
was then exchanged for a long   
steerable  
[place] sheath over a wire.   
Transseptal catheterization was   
performed with a  
transseptal radiofrequency [place]   
needle under fluoroscopic and   
intracardiac  
echocardiographic guidance.  
  
A left atrial voltage map was   
constructed using Carto3 and a   
PentaRay mapping  
catheter. There were very low left   
atrial voltages on mapping sparing   
the  
SAMSON. There were potentials at all   
four pulmonary veins. A [employer]   
[place]  
ST-SF catheter was used for   
ablation. Isolation of all 4 PVs   
was achieved with  
bilateral RF wide-area   
circumferential ablation (WACA)   
lesions and carinal  
lines. There were no potentials at   
the pulmonary veins after ablation,   
Anterior  
wall was isolated by creating 2   
lines from WACA to mitral valve   
annuals,  
patient develope 2:1 arial flutter   
by touching the inferior wall TCL   
was 240ms  
it was poroved to be Lt sided   
flutter by entrainment, it was   
terminated by  
ablating on the inferioir wall ,   
then he develop CTI clockwise   
dependent  
flutter TCL 320ms which was   
terminated by CTI line ablation.   
bidirectional  
block was achieved with >240 ms   
blocks . Adjuvant lesions were   
applied to the  
posterolateral mitral valve annulus   
(Vein of Hermelindo region),   
anteroseptal  
mitral valve annulus, posteroseptal   
mitral valve annulus, SVC-RA septal  
junction, and low mian   
terminalis. The patient was in NSR   
rhythm at the end  
of the case.  
  
**The patient was successfully   
cardioverted with restoration of   
sinus rhythm.  
  
  
There was no pericardial effusion   
on intracardiac echo at the end of   
the  
procedure. The long sheath was   
exchanged for a short 8.5 Fr sheath   
over a wire.  
All sheaths were removed and   
hemostasis was achieved using   
Vascade at all three  
intravenous access sites and manual   
pressure. Throughout the procedure,   
the  
heparin infusion rate was adjusted   
to maintain an ACT between 350-400   
sec.  
Protamine was given to reverse   
anticoagulation at procedure end.  
  
Successful ablation for atrial   
fibrillation with RF isolation of   
all four  
pulmonary veins and adjuvant   
lesions. The patient tolerated the   
procedure well  
with no immediate complications.  
  
  
Plan:  
1. Return home today.  
2. Bedrest for 2 hours then   
ambulate as tolerated.  
3. Continue current home   
medications as prescribed.  
4. Pantoprazole for 4 weeks.  
Tolerance: good  
Complications: none  
  
Attestation:  
Note Completion:  
I am a:Resident/Fellow  
Attending AttestationI was present   
for the entire procedure  
  
  
  
Electronic Signatures:  
Donna Hernandes) (Signed   
23-Dec-2022 09:04)  
Co-Signer: Pre-procedure   
Verification and Time Out, General   
Information,  
Procedure Details, Note Completion  
Jessica Johnson (Resident))   
(Signed 13-Dec-2022 18:47)  
Authored: Pre-procedure   
Verification and Time Out, General   
Information,  
Procedure Details, Note Completion  
  
  
Last Updated: 23-Dec-2022 09:04 by   
Donna Hernandes)                   [facility]  
   
                                        2022 Note     Clinical Note - Phar  
daryl v2:  
Education:  
Document TopicMedication Education  
Is This Intervention Medication   
Reconciliation Relatedyes  
Time [id number] minutes  
Learnerpatient  
Barriersnone  
Methodverbal  
Additional NotesSOURCES USED TO   
CONFIRM HOME MEDICATION LIST  
- Patient with prompting  
- Fill history verified online   
using Nexgence  
- OARRS - no pertinent hx  
  
CRITICAL CATH LAB MEDS  
Aspirin: not taking  
Statin: not prescribed  
P2Y12 inhibitor: not prescribed  
Anticoagulant: Eliquis 5 mg  
  
MEDICATION CHANGES  
- Changed Protonix to Prilosec PRN  
  
MEDICATIONS ADDED  
- Lasix  
  
MEDICATION LIST  
Drug Name: eplerenone 25 mg oral   
tablet  
Instructions: 1 tab(s) oral once a   
day  
  
Drug Name: losartan 25 mg oral   
tablet  
Instructions: 1 tab(s) oral once a   
day  
  
Drug Name: multivitamin Multiple   
Vitamins oral tablet  
Instructions: 1 tab(s) oral once a   
day  
  
Drug Name: apixaban 5 mg oral   
tablet  
Instructions: 1 tab(s) orally 2   
times a day  
  
Drug Name: carvedilol 6.25 mg oral   
tablet  
Instructions: 1 tab(s) orally 2   
times a day (with meals)  
  
Drug Name: furosemide 40 mg oral   
tablet  
Instructions: 1 tab(s) orally once   
a day  
  
Drug Name: acetaminophen 500 mg   
oral tablet  
Instructions: 2 tab(s) orally every   
6 hours, As Needed  
  
Please reach out via DocHalo for   
questions  
Precious Miller, [facility]   PGY1   
Pharmacy Resident  
  
Allergy:  
Allergies Summary  
No documented data.  
  
  
  
Electronic Signatures:  
Raulito Bernard (PharmD) (Signed   
13-Dec-2022 13:45)  
Authored: Education, Allergy  
Trisha Villarreal ([facility]) (Signed   
13-Dec-2022 15:25)  
Co-Signer: Education, Allergy  
  
Last Updated: 13-Dec-2022 15:25 by   
Trisha Villarreal ([facility])                  [facility]  
   
                                        2022 Note     PROCEDURE: XR SHOULD  
ER RT 2V or >  
HISTORY: Shoulder joint pain ,   
chronic  
COMPARISON: None.  
FINDINGS:  
BONES:Narrowing of the   
acromioclavicular joint and large   
undersurface  
osteophytes. Unremarkable humeral   
head and glenohumeral joint.  
SOFT TISSUES:Corticated   
calcification cephalad to the   
humerus near insertion of  
superior rotator cuff.  
EFFUSION:None visible.  
OTHER: Negative.  
IMPRESSION:  
1. Marked degenerative changes of   
the acromioclavicular joint which   
would  
predispose to rotator cuff injury.  
2. Calcification near region of   
superior rotator cuff tendon   
insertion into the  
humeral head suggestive of calcific   
tendinitis.  
Electronically authenticated by:   
MEHRDAD ZIMMER Date: 2022   
16:26                                   [facility]  
   
                                                    Chief complaint Narrative -   
Reported                                ENMANUEL ARRINGTON is being seen for a   
consultation for HOY ABN   
ECHO.76-year-old gentleman seen in   
cardiology consultation at the   
request of Dr. Jansen for abnormal   
echocardiogram and stress imaging.   
He complains of exertional dyspnea   
he states that occurred around or   
just prior to his first booster   
shot for COVID. He also admits that   
he probably had COVID illness but   
was never tested earlier in the   
year.His chief complaints right now   
are occasional lower extremity   
edema right greater than left that   
is improved with transient use of   
diuretics administered by Dr. Jansen.   
Exertional dyspnea with any type of   
walking or walking up stairs or   
work related activities. He denies   
angina, true heart failure   
hospitalizations, syncope. HeHe   
does admit to history of irregular   
heartbeat dating back to  when   
he was in [place] details of   
which are unknown. 4 weeks ago when   
in Dr. Jansen's office for the first   
time in many years, ECG was   
performed revealing atrial   
fibrillation with controlled   
ventricular response. Subsequent   
stress perfusion imaging and   
echocardiography were performed at   
[facility] revealing normal   
perfusion scan, but atrial   
fibrillation so no ejection   
fraction estimate was performed on   
perfusion scan. Echocardiogram   
revealed reduced left ventricular   
function with ejection fraction   
estimated to be 40 to 45%.He was   
then placed on Eliquis for his A.   
fib, and it appears that he has   
been on Eliquis for 2 to 3 weeks.   
Patient and his wife will   
corroborate when his start date for   
Eliquis was and return that   
information.My estimation is that   
he has left ventricular systolic   
heart failure likely secondary to   
chronic atrial fibrillation   
(duration of which is unknown). It   
is worth a shot at least to try   
cardioversion at least once in   
order to restore   
rhythm.Recommendations, informed   
decision-making process, risk   
benefits alternatives of discussed   
with patient. We will proceed with   
guideline directed medical   
therapies including institution of   
carvedilol, losartan, eplerenone,   
and elective cardioversion in the   
end of  at least 6 weeks after   
its initiation of Eliquis, and   
follow-up thereafterwards. We may   
need to institute loop diuretics if   
necessary. Otherwise if atrial   
fibrillation continues to be   
problematic I will have to refer   
him either to general cardiology or   
electrophysiology in the future   
otherwise I will be happy to take   
care of the left ventricular   
function issues at this moment.         [facility]   
[facility] DO  
Work Phone:   
[phone]  
   
                                                    Chief complaint Narrative -   
Reported                                ENMANUEL ARRINGTON is being seen for a   
cardiovascular evaluation of atrial   
fibrillation.                           [id number]  
Work Phone:   
[phone]  
   
                                                    Chief complaint Narrative -   
Reported                                ENMANUEL ARRINGTON is being seen for a   
cardiovascular evaluation of atrial   
fibrillation.                           [id number]   
[address] [place]  
Work Phone:   
[phone]  
   
                                Evaluation note No assessment information availa  
[facility]  
Work Phone:   
[phone]  
  
  
  
                                                    Evaluation note   
  
  
  
                                                    Diagnosis  
   
                                                      
  
  
Paroxysmal atrial fibrillation (CMS/HCC)- Primary  
  
  
Atrial fibrillation  
   
                                                      
  
  
Long term current use of anticoagulant therapy  
   
                                                      
  
  
Nonischemic cardiomyopathy (CMS/HCC)  
  
  
Other primary cardiomyopathies  
   
                                                      
  
  
Overweight with body mass index (BMI) of 27 to 27.9 in adult  
  
documented in this encounter  
[facility]  
Work Phone: [phone] of Present illness Narrative* Enmanuel Arrington is a 
  75 y/o male referred by Dr Murphy for evaluation on AF.  
* PMH includes NICM, R should rotator cuff injury, cocaine abuse x 17 yrs (rehab
   in [place] now sober), oxycodone abuse (started in ) w/ accidental overdose 
  and AF.  
* Treatment of his AF includes carvedilol. Pt has not required DCCV or AAD s.  
* Symptoms of his AF include palpitations and DELEON.  
* Pt recently followed up with his PCP in the end of April/early May and pt was 
  noted to be in AF. Pthad a history of irregular heartbeat but no diagnosis of 
  AF. Pt was started on Eliquis.  
* Pt established with Dr Murphy with the plan for DCCV. His DCCV was cancelled 
  because pt had COVID.Pt is not on ay medications for rate control. Pt has 
  stopped all his medications almost 1 week ago including his Eliquis. pt has a 
  history of Oxycodone abuse which he was started on initially in for a 
  rotator cuff injury. Pt has been obtaining the medications on the street not 
  through prescription. Pt has a recent accidental overdose which required ED 
  visit an reversal with Narcan. Pt was take to rehab by his daughter about 1 
  week ago but left the same day.  
* Pt reports that he has been sober for 6 days. Pt presents today to discuss AF 
  ablation.  
* Echo 2022: EF 40-45%, septal akinesis, LA mildly dilated  
* Stress test 2022: no ischemia or myocardial infarction  
[id number]  
Work Phone: [phone] of Present illness Narrative* Enmanuel Arrington is a 
  75 y/o male referred by Dr Murphy for evaluation on AF.  
* PMH includes NICM, R should rotator cuff injury, cocaine abuse x 17 yrs (rehab
   in [place] now sober), oxycodone abuse (started in ) w/ accidental overdose 
  and AF.  
* Treatment of his AF includes carvedilol. Pt has not required DCCV or AAD s.  
* Symptoms of his AF include palpitations and DELEON.  
* Pt recently followed up with his PCP in the end of April/early May and pt was 
  noted to be in AF. Pthad a history of irregular heartbeat but no diagnosis of 
  AF. Pt was started on Eliquis.  
* Pt established with Dr Murphy with the plan for DCCV. His DCCV was cancelled 
  because pt had COVID.Pt is not on ay medications for rate control. Pt has 
  stopped all his medications almost 1 week ago including his Eliquis. pt has a 
  history of Oxycodone abuse which he was started on initially in for a 
  rotator cuff injury. Pt has been obtaining the medications on the street not 
  through prescription. Pt has a recent accidental overdose which required ED 
  visit an reversal with Narcan. Pt was take to rehab by his daughter about 1 
  week ago but left the same day.  
* Pt reports that he has been sober for 6 days. Pt presents today to discuss AF 
  ablation.  
* Echo 2022: EF 40-45%, septal akinesis, LA mildly dilated  
* Stress test 2022: no ischemia or myocardial infarction  
[facility] [address] [place]  
Work Phone: [phone] of Present illness Narrative* Enmanuel Arrington is a 
  76 y/o male initially referred by Dr Murphy for evaluation on AF.  
* PMH includes NICM, R should rotator cuff injury, cocaine abuse x 17 yrs (rehab
   in [place] now sober), oxycodone abuse (started in ) w/ accidental overdose 
  and AF.  
* Treatment of his AF includes carvedilol. Pt has not required DCCV or AAD s.  
* Symptoms of his AF include palpitations and DELEON.  
* Pt recently followed up with his PCP in the end of April/early May and pt was 
  noted to be in AF. Pthad a history of irregular heartbeat but no diagnosis of 
  AF. Pt was started on Eliquis.  
* Pt established with Dr Murphy with the plan for DCCV. His DCCV was cancelled 
  because pt had COVID.. Pt has a history of Oxycodone abuse which he was 
  started on initially in  for a rotator cuff injury. Pt has been obtaining 
  the medications on the street not through prescription. Pt had a recent
  accidental overdose which required ED visit an reversal with Narcan. Pt was 
  take to rehab by his daughter about 1 week ago but left the same day.  
* Pt reports that he has been sober since August  
* Echo 2022: EF 40-45%, septal akinesis, LA mildly dilated  
* Stress test 2022: no ischemia or myocardial infarction  
* Now s/p PVI and adjuvant lesion RFA with Dr. Hernandes 2022  
* Patient unfortunately caught a URI around [place] and also experienced a 
  fever. He was started onantibiotics around  and has a couple of more days
   still before they are complete.  
* ECG 2023 Atypical Aflutter HR 95 bpm.  
* TODAY Patient presents for 3 week follow up post ablation. He denies any 
  arrhythmia symptoms. He isstill coughing, especially at night due to his URI, 
  but the SOB is improving after the antibiotics.He denies any palpitations, 
  syncope, heart racing, and chest pain. He denies any bleeding/bruising/s
  welling at the right groin site. His wife is present with him and says he is 
  taking all his medication properly. She would like a refill on the Coreg with 
  the correct dosage so she doesn't have to cut the pill in half  
[facility] [address] [place]  
Work Phone: [phone] of Present illness Narrative* Enmanuel Arrington is a 
  76 y/o male initially referred by Dr Murphy for evaluation on AF.  
* PMH includes NICM, R should rotator cuff injury, cocaine abuse x 17 yrs (rehab
   in [place] now sober), oxycodone abuse (started in ) w/ accidental overdose 
  and AF.  
* Treatment of his AF includes carvedilol. Pt has not required DCCV or AAD s.  
* Symptoms of his AF include palpitations and DELEON. .  
* Pt established with Dr Murphy with the plan for DCCV. His DCCV was cancelled 
  because pt had COVID.. Pt has a history of Oxycodone abuse which he was 
  started on initially in  for a rotator cuff injury. Pt has been obtaining 
  the medications on the street not through prescription. Pt had a recent
  accidental overdose which required ED visit an reversal with Narcan. Pt was 
  take to rehab by his daughter about 1 week ago but left the same day.  
* Pt reports that he has been sober since August  
* Echo 2022: EF 40-45%, septal akinesis, LA mildly dilated  
* Stress test 2022: no ischemia or myocardial infarction  
* Now s/p PVI and adjuvant lesion RFA with Dr. Hernandes 2022  
* Patient unfortunately caught a URI around [place] and also experienced a 
  fever. He was started onantibiotics around   
* ECG 2023 Atypical Aflutter HR 95 bpm.  
* S/P DCCV with Dr. Murphy  
* ECG 3/27/2023 NSR with first degree AV block, HR 82 bpm  
* TODAY Patient presents for 3 month follow up post Afib ablation. He is feeling
   good. He denies any arrhythmia symptoms since his cardioversion. Per his 
  wife, he still gets tired throughout the day and takes daily naps. But 
  overall, he no longer feels palpitations or DELEON.  
[facility] [address] [place]  
Work Phone: [phone] for referral (narrative)* Consultation 
  (Routine) - Authorized  
  
                          Specialty    Diagnoses / Procedures Referred By Contac  
t Referred To Contact  
   
                                        Cardiology            
  
  
Diagnoses  
  
  
Paroxysmal atrial   
fibrillation (CMS/HCC)  
  
  
Long term current use of   
anticoagulant therapy  
  
  
  
Procedures  
  
  
Follow Up In Cardiology                   
  
  
Sarath Montes APRN-CNP  
  
  
[address]  
  
  
[place] [address]  
  
  
Phone: [phone]  
  
  
Fax: [phone]                         
  
  
Agustín Murphy,   
  
  
[address]  
  
  
[facility] [address]  
  
  
[place] [address]  
  
  
Phone: [phone]  
  
  
Fax: [phone]  
  
  
  
                    Referral ID Status    Reason    Start Date Expiration Date V  
isits   
Requested                               Visits   
Authorized  
   
                [id number] Authorized         2023 10/31/2024 1       1  
  
  
  
  
Electronically signed by Sarath HAMLIN-CNP at 2023 10:12 AM [facility]  
Work Phone: [phone]  
  
Chief Complaint and Reason for Visit  
  
  
                                        Chief Complaint     dyspnea palpitations  
 chest pain afib  
  
  
  
                                        Chief Complaint     dyspnea palpitations  
 chest pain afib  
Afib  
  
  
  
                                        Chief Complaint     dyspnea palpitations  
 chest pain afib  
Afib  
Afib  
  
  
  
Advance Directives  
No Advanced Directives Records Found  
  
                                Advance Directive Response        Recorded Date/  
Time  
   
                                Advance Directives No              ,   
022 9:48am  
  
  
  
Family History  
No Family History Records FoundUnknown Family Member  
  
                                Name            Dates           Details  
   
                                                    FH: CABG (coronary artery by  
pass surgery): Father(V17.3, Z82.49)  
                                                    Status:Active  
   
                                                    Family history of lung cance  
r: Mother, Sibling(V16.1, Z80.1)  
                                                    Status:Active  
   
                                                    Family history of cerebrovas  
cular accident (CVA): Father(V17.1,   
Z82.3)  
                                                    Status:Active  
  
                              Unknown Family Member  
  
                                Name            Dates           Details  
   
                                                    FH: CABG (coronary artery by  
pass surgery): Father(V17.3, Z82.49)  
                                                    Status:Active  
   
                                                    Family history of cerebrovas  
cular accident (CVA): Father(V17.1,   
Z82.3)  
                                                    Status:Active  
   
                                                    Family history of lung cance  
r: Mother, Sibling(V16.1, Z80.1)  
                                                    Status:Active  
  
                              Unknown Family Member  
  
                                Name            Dates           Details  
   
                                                    FH: CABG (coronary artery by  
pass surgery): Father(V17.3, Z82.49)  
                                                    Status:Active  
   
                                                    Family history of lung cance  
r: Mother, Sibling(V16.1, Z80.1)  
                                                    Status:Active  
   
                                                    Family history of cerebrovas  
cular accident (CVA): Father(V17.1,   
Z82.3)  
                                                    Status:Active  
  
                              Unknown Family Member  
  
                                Name            Dates           Details  
   
                                                    FH: CABG (coronary artery by  
pass surgery): Father(V17.3, Z82.49)  
                                                    Status:Active  
   
                                                    Family history of lung cance  
r: Mother, Sibling(V16.1, Z80.1)  
                                                    Status:Active  
   
                                                    Family history of cerebrovas  
cular accident (CVA): Father(V17.1,   
Z82.3)  
                                                    Status:Active  
  
                              Unknown Family Member  
  
                                Name            Dates           Details  
   
                                                    FH: CABG (coronary artery by  
pass surgery): Father(V17.3, Z82.49)  
                                                    Status:Active  
   
                                                    Family history of lung cance  
r: Mother, Sibling(V16.1, Z80.1)  
                                                    Status:Active  
   
                                                    Family history of cerebrovas  
cular accident (CVA): Father(V17.1,   
Z82.3)  
                                                    Status:Active  
  
                              Unknown Family Member  
  
                                Name            Dates           Details  
   
                                                    FH: CABG (coronary artery by  
pass surgery): Father(V17.3, Z82.49)  
                                                    Status:Active  
   
                                                    Family history of lung cance  
r: Mother, Sibling(V16.1, Z80.1)  
                                                    Status:Active  
   
                                                    Family history of cerebrovas  
cular accident (CVA): Father(V17.1,   
Z82.3)  
                                                    Status:Active  
  
                              Unknown Family Member  
  
                                Name            Dates           Details  
   
                                                    Family history of cerebrovas  
cular accident (CVA): Father(V17.1,   
Z82.3)  
                                                    Status:Active  
   
                                                    Family history of lung cance  
r: Mother, Sibling(V16.1, Z80.1)  
                                                    Status:Active  
   
                                                    FH: CABG (coronary artery by  
pass surgery): Father(V17.3, Z82.49)  
                                                    Status:Active  
  
                              Unknown Family Member  
  
                                Name            Dates           Details  
   
                                                    FH: CABG (coronary artery by  
pass surgery): Father(V17.3, Z82.49)  
                                                    Status:Active  
   
                                                    Family history of lung cance  
r: Mother, Sibling(V16.1, Z80.1)  
                                                    Status:Active  
   
                                                    Family history of cerebrovas  
cular accident (CVA): Father(V17.1,   
Z82.3)  
                                                    Status:Active  
  
                              Unknown Family Member  
  
                                Name            Dates           Details  
   
                                                    FH: CABG (coronary artery by  
pass surgery): Father(V17.3, Z82.49)  
                                                    Status:Active  
   
                                                    Family history of lung cance  
r: Mother, Sibling(V16.1, Z80.1)  
                                                    Status:Active  
   
                                                    Family history of cerebrovas  
cular accident (CVA): Father(V17.1,   
Z82.3)  
                                                    Status:Active  
  
                              Unknown Family Member  
  
                                Name            Dates           Details  
   
                                                    FH: CABG (coronary artery by  
pass surgery): Father(V17.3, Z82.49)  
                                                    Status:Active  
   
                                                    Family history of lung cance  
r: Mother, Sibling(V16.1, Z80.1)  
                                                    Status:Active  
   
                                                    Family history of cerebrovas  
cular accident (CVA): Father(V17.1,   
Z82.3)  
                                                    Status:Active  
  
                              Unknown Family Member  
  
                                Name            Dates           Details  
   
                                                    FH: CABG (coronary artery by  
pass surgery): Father(V17.3, Z82.49)  
                                                    Status:Active  
   
                                                    Family history of lung cance  
r: Mother, Sibling(V16.1, Z80.1)  
                                                    Status:Active  
   
                                                    Family history of cerebrovas  
cular accident (CVA): Father(V17.1,   
Z82.3)  
                                                    Status:Active  
  
                              Unknown Family Member  
  
                                Name            Dates           Details  
   
                                                    FH: CABG (coronary artery by  
pass surgery): Father(V17.3, Z82.49)  
                                                    Status:Active  
   
                                                    Family history of lung cance  
r: Mother, Sibling(V16.1, Z80.1)  
                                                    Status:Active  
   
                                                    Family history of cerebrovas  
cular accident (CVA): Father(V17.1,   
Z82.3)  
                                                    Status:Active  
  
                              Unknown Family Member  
  
                                Name            Dates           Details  
   
                                                    FH: CABG (coronary artery by  
pass surgery): Father(V17.3, Z82.49)  
                                                    Status:Active  
   
                                                    Family history of lung cance  
r: Mother, Sibling(V16.1, Z80.1)  
                                                    Status:Active  
   
                                                    Family history of cerebrovas  
cular accident (CVA): Father(V17.1,   
Z82.3)  
                                                    Status:Active  
  
                              Unknown Family Member  
  
                                Name            Dates           Details  
   
                                                    FH: CABG (coronary artery by  
pass surgery): Father(V17.3, Z82.49)  
                                                    Status:Active  
   
                                                    Family history of lung cance  
r: Mother, Sibling(V16.1, Z80.1)  
                                                    Status:Active  
   
                                                    Family history of cerebrovas  
cular accident (CVA): Father(V17.1,   
Z82.3)  
                                                    Status:Active  
  
                              Unknown Family Member  
  
                                Name            Dates           Details  
   
                                                    FH: CABG (coronary artery by  
pass surgery): Father(V17.3, Z82.49)  
                                                    Status:Active  
   
                                                    Family history of lung cance  
r: Mother, Sibling(V16.1, Z80.1)  
                                                    Status:Active  
   
                                                    Family history of cerebrovas  
cular accident (CVA): Father(V17.1,   
Z82.3)  
                                                    Status:Active  
  
                              Unknown Family Member  
  
                                Name            Dates           Details  
   
                                                    FH: CABG (coronary artery by  
pass surgery): Father(V17.3, Z82.49)  
                                                    Status:Active  
   
                                                    Family history of lung cance  
r: Mother, Sibling(V16.1, Z80.1)  
                                                    Status:Active  
   
                                                    Family history of cerebrovas  
cular accident (CVA): Father(V17.1,   
Z82.3)  
                                                    Status:Active  
  
                              Unknown Family Member  
  
                                Name            Dates           Details  
   
                                                    FH: CABG (coronary artery by  
pass surgery): Father(V17.3, Z82.49)  
                                                    Status:Active  
   
                                                    Family history of lung cance  
r: Mother, Sibling(V16.1, Z80.1)  
                                                    Status:Active  
   
                                                    Family history of cerebrovas  
cular accident (CVA): Father(V17.1,   
Z82.3)  
                                                    Status:Active  
  
                              Unknown Family Member  
  
                                Name            Dates           Details  
   
                                                    FH: CABG (coronary artery by  
pass surgery): Father(V17.3, Z82.49)  
                                                    Status:Active  
   
                                                    Family history of lung cance  
r: Mother, Sibling(V16.1, Z80.1)  
                                                    Status:Active  
   
                                                    Family history of cerebrovas  
cular accident (CVA): Father(V17.1,   
Z82.3)  
                                                    Status:Active  
  
  
  
Summary Purpose  
  
  
                                                      
  
  
  
Chief Complaint  
ENMANUEL ARRINGTON is being seen for a 3 week follow-up of atrial fibrillation, atrial 
flutter and s/p PVI with adjuvant lesion RFA with Dr. Hernandes 2022.ENMANUEL ARRINGTON is being seen for a 3 month follow-up of atrial fibrillation.* ENMANUEL ARRINGTON 
  is being seen for s/p ablation.  
* 77-year-old gentleman returns for follow-up following recent A-fib ablation 
  with Dr. Hernandes, he is doing well from a cardiovascular standpoint with no 
  shortness of breath, palpitations, recurrent heart failure. He remains on 
  appropriate GDMT. He has a nonischemic cardiomyopathy by echo last year with 
  ejection fraction of 40 to 45%. Stress perfusion exam last year was normal.. 
  He was subsequently referred for ablation which has proceeded.  
* He has just returned from [place], he has put on a little weight after feeling
   better and restoration of normal sinus rhythm.  
* NYHA classification is class II/C  
* Recommendations, continue current therapies, continue Eliquis for the time 
  being, obtain echocardiogram for LV functional assessment and follow-up with 
  nurse practitioner within the next 6 months  
  
  
Additional Source Comments  
  
  
  
                                                    Care Teams (unrecognized sec  
tion and content)  
   
                                          
  
  
  
                                                    Team Status: Inactive   
   
                          Member       Role         Status       Dates  
   
                          Leatha Jansen MD Primary Care Provider, Attending Pr  
ovider Active         
   
                          W Ham Murphy DO Referring Provider Active         
  
  
  
                                                    Team Status: Active   
   
                          Member       Role         Status       Dates  
   
                          Leatha Jansen MD Primary Care Provider Active         
  
  
  
                                                    Team Status: Inactive   
   
                          Member       Role         Status       Dates  
   
                          Leatha Jansen MD Primary Care Provider Active         
   
                          LAURA Murphy DO Attending Provider Active         
  
  
  
                                                    Team Status: Inactive   
   
                          Member       Role         Status       Dates  
   
                          Leatha Jansen MD Primary Care Provider Active         
   
                          W Ham Murphy DO Attending Provider Active         
   
                          George Leonard MD Referring Provider Active         
  
  
  
                      Team Member Relationship Specialty  Start Date End Date  
   
                                                      
  
  
Leatha Jansen MD  
  
  
NPI: [id number]  
  
  
[address] [place] [address]  
  
  
[phone] (Work)  
  
  
[phone] (Fax) PCP - General                   00            
  
  
  
  
  
                                                    Goals (unrecognized section   
and content)  
   
                                                    Goals may be documented in a  
n alternate sectionGoals may be documented in an   
alternate sectionGoals may be documented in an alternate section  
  
  
  
  
                                                    PREGNANCY (unrecognized sect  
ion and content)  
   
                                                    No Pregnancy Status Records   
FoundNo Pregnancy Status Records FoundNo Pregnancy   
Status   
Records FoundNo Pregnancy Status Records FoundNo Pregnancy Status Records Found  
  
  
  
  
                                                    INFORMATION SOURCE (unrecogn  
ized section and content)  
   
                                          
  
  
  
                                        DATE CREATED        AUTHOR  
   
                                2022                      [facility]  
  
  
  
                                DATE CREATED    AUTHOR          AUTHOR'S ORGANIZ  
ATION  
   
                                2023                      [facility]  
  
  
  
                                DATE CREATED    AUTHOR          AUTHOR'S ORGANIZ  
ATION  
   
                                2023                      [facility]  
  
  
  
                                DATE CREATED    AUTHOR          AUTHOR'S ORGANIZ  
ATION  
   
                                2023                       [employer]  
  
  
  
                                DATE CREATED    AUTHOR          AUTHOR'S ORGANIZ  
ATION  
   
                                2023                      [facility]  
  
  
  
  
  
                                                    Reason for Visit (unrecogniz  
ed section and content)  
   
                                          
  
  
  
                                        Reason              Comments  
   
                                        Follow-up           6m  
  
  
FOR RECORDS PERTAINING TO PATIENTS WHO ARE OR HAVE BEEN ENROLLED IN A CHEMICAL 
DEPENDENCY/SUBSTANCEABUSE PROGRAM, SOME INFORMATION MAY BE OMITTED. This 
clinical summary was aggregated from multiple sources. Caution should be 
exercised in using it in the provision of clinical care. This summary normalizes
 information from multiple sources, and as a consequence, information in this 
document may materially change the coding, format and clinical context of 
patient data. In addition, data may be omitted in some cases. CLINICAL DECISIONS
 SHOULD BE BASED ON THE PRIMARY CLINICAL RECORDS. [email or web address] provides
 no warranty or guarantee of the accuracy or completeness of information in this
 document.

## 2024-02-07 ENCOUNTER — HOSPITAL ENCOUNTER
Dept: HOSPITAL 101 - LAB | Age: 79
Discharge: HOME | End: 2024-02-07
Payer: MEDICARE

## 2024-02-07 DIAGNOSIS — F11.10: Primary | ICD-10-CM

## 2024-02-07 DIAGNOSIS — Z79.899: ICD-10-CM

## 2024-02-07 PROCEDURE — 80334 ANTIDEPRESSANTS CLASS 6/MORE: CPT

## 2024-02-07 PROCEDURE — 80358 DRUG SCREENING METHADONE: CPT

## 2024-02-07 PROCEDURE — 80341 ANTIEPILEPTICS NOS 7/MORE: CPT

## 2024-02-07 PROCEDURE — 80338 ANTIDEPRESSANT NOT SPECIFIED: CPT

## 2024-02-07 PROCEDURE — 80377 DRUG/SUBSTANCE NOS 7/MORE: CPT

## 2024-02-07 PROCEDURE — 80354 DRUG SCREENING FENTANYL: CPT

## 2024-02-07 PROCEDURE — 80353 DRUG SCREENING COCAINE: CPT

## 2024-02-07 PROCEDURE — 80359 METHYLENEDIOXYAMPHETAMINES: CPT

## 2024-02-07 PROCEDURE — 80346 BENZODIAZEPINES1-12: CPT

## 2024-02-07 PROCEDURE — 80307 DRUG TEST PRSMV CHEM ANLYZR: CPT

## 2024-02-07 PROCEDURE — 80365 DRUG SCREENING OXYCODONE: CPT

## 2024-02-07 PROCEDURE — 80337 TRICYCLIC & CYCLICALS 6/MORE: CPT

## 2024-02-07 PROCEDURE — 83992 ASSAY FOR PHENCYCLIDINE: CPT

## 2024-02-07 PROCEDURE — 80344 ANTIPSYCHOTICS NOS 7/MORE: CPT

## 2024-02-07 PROCEDURE — 80364 OPIOID &OPIATE ANALOG 5/MORE: CPT

## 2024-02-07 PROCEDURE — 80348 DRUG SCREENING BUPRENORPHINE: CPT

## 2024-02-07 PROCEDURE — 80373 DRUG SCREENING TRAMADOL: CPT

## 2024-02-07 PROCEDURE — 80370 SKEL MUSC RELAXANT 3 OR MORE: CPT

## 2024-02-07 PROCEDURE — 80371 STIMULANTS SYNTHETIC: CPT

## 2024-02-07 PROCEDURE — 80367 DRUG SCREENING PROPOXYPHENE: CPT

## 2024-02-07 PROCEDURE — 80366 DRUG SCREENING PREGABALIN: CPT

## 2024-02-07 PROCEDURE — 80368 SEDATIVE HYPNOTICS: CPT

## 2024-02-07 PROCEDURE — 80357 KETAMINE AND NORKETAMINE: CPT

## 2024-02-07 PROCEDURE — 80360 METHYLPHENIDATE: CPT

## 2024-02-07 PROCEDURE — 80361 OPIATES 1 OR MORE: CPT

## 2024-02-07 PROCEDURE — 82570 ASSAY OF URINE CREATININE: CPT

## 2024-02-07 PROCEDURE — 80326 AMPHETAMINES 5 OR MORE: CPT

## 2024-02-07 PROCEDURE — 80331 ANALGESICS NON-OPIOID 6/MORE: CPT

## 2024-02-07 PROCEDURE — 80372 DRUG SCREENING TAPENTADOL: CPT

## 2024-02-07 PROCEDURE — 80355 GABAPENTIN NON-BLOOD: CPT

## 2024-02-07 NOTE — XMS_ITS
Comprehensive CCD (C-CDA v2.1)  
  
                          Created on: 2024  
  
  
ENMANUEL ARRINGTON  
External Reference #: [id number]  
: 1945  
Sex: Male  
  
Demographics  
  
  
                                        Address             [address] [place]  [address]  
   
                                        Home Phone          [phone]  
   
                                        Home Phone          [phone]  
   
                                        Preferred Language  en  
   
                                        Marital Status        
   
                                        Alevism Affiliation Unknown  
   
                                        Race                White  
   
                                        Ethnic Group        Not  or Lati  
no  
  
  
Author  
  
  
                                        Name                Unknown  
   
                                        Address             [address]  
[address]  
[place]  [address]  
   
                                        Phone               [phone]  
   
                                        Organization        [facility]  
  
  
Care Team Providers  
  
  
                                Care Team Member Name Role            Phone  
   
                                MD Leatha Jansen Primary Care Provider [phone]  
[phone]  
   
                                MD Leatha Jansen Attending Provider [phone]  
[phone]  
   
                                DO LAURA Murphy Referring Provider [phone]  
   
                                Leatha Jansen  Unavailable     [phone]  
   
                                Unavailable     Unavailable     [phone]  
   
                                DO LAURA Murphy Attending Provider [phone]  
   
                                MD George Leonard Referring Provider [phone]  
   
                                DR LEATHA JANSEN Primary Care    Unavailable  
   
                                DR LEATHA JANSEN Attending       Unavailable  
   
                                SOM, DR ZHANG Admitting       Unavailable  
   
                                SOM, DR ZHANG Primary Care    Unavailable  
   
                                SOM, DR ZHANG Attending       Unavailable  
   
                                SOM, DR ZHANG Admitting       Unavailable  
   
                                SOM, DR ZHANG Attending       Unavailable  
   
                                SOM, DR ZHANG Admitting       Unavailable  
   
                                DR LEATHA JANSEN Primary Care    Unavailable  
   
                                DR FRANCIA MONTES Consulting      Unavailable  
   
                                DR LEATHA JANSEN Consulting      Unavailable  
   
                                DR ALYCE CAZARES Consulting      Unavailable  
   
                                CORKY GONZALEZ Consulting      Unavailable  
   
                                SURENDRA MANCERA Consulting      Unavailable  
   
                                SOCRATES RICHARDS       Consulting      Unavailable  
   
                                DR LEATHA JANSEN Primary Care    Unavailable  
   
                                FAWWAD, BLAIR H Admitting       Unavailable  
   
                                FAWWAD, BLAIR H Attending       Unavailable  
   
                                FAWWAD, BLAIR H Attending       Unavailable  
   
                                FAWWAD, BLAIR H Admitting       Unavailable  
   
                                DR LEATHA JANSEN Primary Care    Unavailable  
   
                                FAWWAD, BLAIR H Attending       Unavailable  
   
                                FAWWAD, BLAIR H Admitting       Unavailable  
   
                                DR LEATHA JANSEN Primary Care    Unavailable  
   
                                FAWWAD, BLAIR H Attending       Unavailable  
   
                                FAWWAD, BLAIR H Admitting       Unavailable  
   
                                DR LEATHA JANSEN Primary Care    Unavailable  
   
                                FAWWAD, BLAIR H Attending       Unavailable  
   
                                FAWWAD, BLAIR H Admitting       Unavailable  
   
                                HOY, DR ZHANG Primary Care    Unavailable  
   
                                FAWWAD, BLAIR H Attending       Unavailable  
   
                                FAWWAD, BLAIR H Admitting       Unavailable  
   
                                HOY, DR ZHANG Primary Care    Unavailable  
   
                                HOY, DR ZHANG Consulting      Unavailable  
   
                                HOY, DR ZHANG Attending       Unavailable  
   
                                HOY, DR ZHANG Admitting       Unavailable  
   
                                HOY, DR ZHANG Primary Care    Unavailable  
   
                                HOY, DR ZHANG Consulting      Unavailable  
   
                                HOY, DR ZHANG Attending       Unavailable  
   
                                HOY, DR ZHANG Admitting       Unavailable  
   
                                HOY, DR ZHANG Primary Care    Unavailable  
   
                                Zieber, DR Fleming Consulting      Unavailable  
   
                                HOY, DR ZHANG Consulting      Unavailable  
   
                                HOY, DR ZHANG Attending       Unavailable  
   
                                HOY, DR ZHANG Admitting       Unavailable  
   
                                HOY, DR ZHANG Primary Care    Unavailable  
   
                                HOY, DR ZHANG Attending       Unavailable  
   
                                HOY, DR ZHANG Admitting       Unavailable  
   
                                HOY, DR ZHANG Primary Care    Unavailable  
   
                                HOY, DR ZHANG Consulting      Unavailable  
   
                                LAURA Murphy Admitting       Unavailable  
   
                                JeffreyLAURA millan Referring       Unavailable  
   
                                JeffreyLAURA Attending       Unavailable  
   
                                Hoy, Leatha M  Primary Care    Unavailable  
   
                                JeffreyLAURA Ham Referring       Unavailable  
   
                                Hoy, Leatha M  Admitting       Unavailable  
   
                                Hoy, Leatha M  Attending       Unavailable  
   
                                Hoy, Leatha M  Primary Care    Unavailable  
   
                                JeffreyLAURA millan Attending       Unavailable  
   
                                Hoy, Leatha M  Primary Care    Unavailable  
   
                                Jeffrey, W Ham Admitting       Unavailable  
   
                                JeffreyLAURA Ham Attending       Unavailable  
   
                                Leonard, Vazquez Referring       Unavailable  
   
                                Hoy, Leatha M  Primary Care    Unavailable  
   
                                JeffreyLAURA millan Admitting       Unavailable  
   
                                Honeisha, Dr. Leatha Hawkins Primary Care    Unavail  
able  
   
                                Agustín Murphy Attending       Unavailable  
   
                                Arias, Dr. Donna Hills Attending       Un  
available  
   
                                UNKNOWN, PCP    Referring       Unavailable  
   
                                Hoy, Dr. Leatha Hawkins Primary Care    Unavail  
able  
   
                                Arias, Dr. Donna Hills Attending       Un  
available  
   
                                Arias, Dr. Donna Hills Referring       Un  
available  
   
                                Arias, Dr. Donna Hills Admitting       Un  
available  
   
                                Hoy, Dr. Leatha Hawkins Primary Care    Unavail  
able  
   
                                Hoy, Dr. Leatha Hawkins Primary Care    Unavail  
able  
   
                                Arias, Dr. Donna Hills Attending       Un  
available  
   
                                Agustín Murphy Referring       Unavailable  
   
                                MedSivan holm  Attending       Unavailable  
   
                                Sivan Schuler  Referring       Unavailable  
   
                                Hoy, Dr. Leatha Hawkins Primary Care    Unavail  
able  
   
                                Hoy, Dr. Leatha Hawkins Primary Care    Unavail  
able  
   
                                Meder, Sivan  Attending       Unavailable  
   
                                Dr. Donna Hernandes Attending       Un  
available  
   
                                Dr. Leatha Jansen Primary Care    Unavail  
montez Jansen, Dr. Leatha Hawkins Primary Care    Unavail  
able  
   
                                Agustín Murphy Attending       Unavailable  
   
                                Agustín Murphy Referring       Unavailable  
   
                                Dr. Leatha Jansen Primary Care    Unavail  
montez Jansen, Dr. Leatha Hawkins Referring       Unavail  
Agustín Ramon Attending       Unavailable  
   
                                Leatha Jansen MD Primary Care Provider [phone]  
   
                                SARATH MONTES  Attending       Unavailable  
   
                                LEATHA JANSEN Primary Care    Unavailable  
  
  
  
Medications  
Current Medications  
  
  
  
                      Medication Drug Class(es) Dates      Sig (Normalized) Sig   
(Original)  
   
                                                    apixaban 5 mg oral   
tablet  
(20 sources)                            Factor Xa   
Inhibitor                               Start:   
2022  
End:   
2023                              take 1 tablet by   
mouth twice daily                       Apixaban   
(Eliquis) 5 mg   
Tablet Active 5   
MG PO Twice daily   
2022   
3:25pm  
   
                                                    carvedilol 6.25 mg   
oral tablet  
(20 sources)                            alpha-Adrenergic   
Blocker,   
beta-Adrenergic   
Blocker                                 Start:   
2022                              take 0.5 tablet by   
mouth twice daily                       carvedilol   
(Coreg) 6.25 mg   
tablet Take 0.5   
tablets (3.125   
mg) by mouth   
twice a day. 0   
2022 Active  
  
  
  
                                        Start: 2022   take 1 tablet by liseth  
th twice   
daily at dinner                         Carvedilol 3.125 MG Oral Tablet   
take 1 tablet by mouth twice a day   
with MORNING AND EVENING MEAL   
Quantity: 3 Refills: 3 Ordered:   
10-Claudy-2023 Sivan Torres   
Start : 5-Aug-2022 Active  
   
                                        Start: 2022   take 1 tablet by liseth  
th twice   
daily at mealtime                       Carvedilol 6.25 MG Oral Tablet TAKE   
1 TABLET TWICE DAILY WITH MEALS.   
Quantity: 180 Refills: 3 Ordered:   
26-May-2022 Agustín Murphy DO   
Start : 26-May-2022 Active  
  
  
  
                                                    chlorhexidine gluconate   
1.2 mg/ml mouthwash  
(7 sources)                                         Start: 2022  
End: 2022                         take 15 mL by   
mouth in the   
morning                                 Chlorhexidine Gluconate   
0.12 % Mouth/Throat   
Solution RINSE MOUTH   
WITH 15ML (1 CAPFUL) FOR   
30 SECONDS AM AND PM   
AFTER TOOTHBRUSHING.   
EXPECTORATE AFTER   
RINSING, DO NOT SWALLOW   
Quantity: 1 Refills: 0   
Ordered: 2022   
Choco Perry Start :   
2022 End :   
2022 Active  
  
  
  
                                                    Start: 2022  
End: 2023                                     chlorhexidine (Hibiclens) 4   
% external liquid Apply topically.   
0   
2022 Discontinued (Therapy completed)  
   
                                                    Start: 2022  
End: 2023                                     Hibiclens 4 % External Liqui  
d Use as directed for preoperative   
shower. Quantity: 1 Refills: 0 Ordered: 2022 Choco Perry Start : 2022 End : 27-Mar-2023 Complete  
  
  
  
                                                    losartan potassium   
25 mg oral tablet  
(20 sources)                            Angiotensin 2   
Receptor Blocker                        Start:   
2022                              take 1 tablet   
by mouth once   
daily                                   losartan (Cozaar)   
25 mg tablet Take 1   
tablet (25 mg) by   
mouth once daily. 0   
2022 Active  
   
                                                    pantoprazole 40 mg   
delayed release oral   
tablet  
(16 sources)                            Proton Pump   
Inhibitor                               Start:   
2022  
End: 2023                         take 1 tablet   
by mouth once   
daily as   
needed                                  Pantoprazole   
(Protonix) 40 mg   
Tablet,Delayed   
Release (Dr/Ec)   
Active 40 MG PO   
Daily 2022 3:31pm Pt   
states taking prn  
   
                                                    rivaroxaban 20 mg   
oral tablet  
(1 source)                              Factor Xa   
Inhibitor                                           take 1 tablet   
by mouth once   
daily at   
mealtime                                rivaroxaban   
(Xarelto) 20 mg   
tablet Take 1   
tablet (20 mg) by   
mouth once daily.   
Take with food. 0   
Active  
   
                                                    spironolactone 25 mg   
oral tablet  
(2 sources)                             Aldosterone   
Antagonist                              Start:   
2023  
End: 2023                         take 1 tablet   
by mouth once   
daily                                   spironolactone   
(Aldactone) 25 mg   
tablet Indications:   
Paroxysmal atrial   
fibrillation   
(CMS/HCC) ,   
Nonischemic   
cardiomyopathy   
(CMS/HCC) Take 1   
tablet (25 mg) by   
mouth once daily.   
90 tablet 3   
2023 Active  
  
  
  
Completed/Discontinued Medications  
  
  
  
                      Medication Drug Class(es) Dates      Sig (Normalized) Sig   
(Original)  
   
                                                    eplerenone 25 mg   
oral tablet  
(20 sources)                            Aldosterone   
Antagonist                              Start:   
2022  
End:   
2023                              take 1 tablet by   
mouth once daily                        eplerenone (Inspra)   
25 mg tablet Take 1   
tablet (25 mg) by   
mouth once daily. 0   
2022   
Discontinued   
(Ineffective)  
  
  
  
Problems  
Active Problems  
  
  
                      Problem Classification Problem    Date       Documented Da  
te Episodic/Chronic  
   
                                                    Blindness and vision   
defects  
(1 source)                              Unspecified visual   
loss; Translations:   
[Unspecified visual   
loss]                                   Onset:   
2022                                          Chronic  
   
                                                    Cardiac dysrhythmias  
(20 sources)                            Atrial fibrillation;   
Translations:   
[Atrial   
fibrillation]                           Onset:   
2022                                          Chronic  
   
                                                    Congestive heart   
failure;   
nonhypertensive  
(4 sources)                             Unspecified   
diastolic   
(congestive) heart   
failure;   
Translations:   
[Congestive heart   
failure stage C]                        Onset:   
04-                10-                Chronic  
   
                                                    Esophageal disorders  
(1 source)                              Gastro-esophageal   
reflux disease   
without esophagitis;   
Translations:   
[Gastro-esophageal   
reflux disease   
without esophagitis]                    Onset:   
2022                                          Chronic  
   
                                                    Essential hypertension  
(1 source)                              Essential (primary)   
hypertension;   
Translations:   
[Essential (primary)   
hypertension]                           Onset:   
2022                                          Chronic  
   
                                                    Hypertension with   
complications and   
secondary hypertension  
(1 source)                              Hypertensive heart   
disease with heart   
failure;   
Translations: [HTN   
HEART DISEASE   
W/HEART FAIL]                           Onset:   
2022                                          Chronic  
   
                                                    Osteoarthritis  
(1 source)                              Unspecified   
osteoarthritis,   
unspecified site;   
Translations:   
[Unspecified   
osteoarthritis,   
unspecified site]                       Onset:   
2022                                          Chronic  
   
                                                    Other aftercare  
(4 sources)                             Encounter for   
therapeutic drug   
level monitoring;   
Translations: [ENC   
THERAPEUTC DRUG LEVL   
MONITORING]                             Onset:   
2022                                          Episodic  
   
                                                    Other aftercare  
(4 sources)                             Long term (current)   
use of   
anticoagulants;   
Translations: [LONG   
TERM CURRNT USE   
ANTICOAGULANTS]                         Onset:   
2022                                          Episodic  
   
                                                    Other aftercare  
(2 sources)                             Long-term current   
use of   
anticoagulant;   
Translations: [Long   
term (current) use   
of anticoagulants]                      Onset:   
2023                Episodic  
   
                                                    Other ear and sense   
organ disorders  
(1 source)                              Unspecified hearing   
loss, unspecified   
ear; Translations:   
[Unspecified hearing   
loss, unspecified   
ear]                                    Onset:   
2022                                          Chronic  
   
                                                    Other lower   
respiratory disease  
(20 sources)                            Dyspnea on exertion;   
Translations: [Other   
respiratory   
abnormalities]                          Onset:   
10-                10-                Episodic  
   
                                                    Other nutritional;   
endocrine; and   
metabolic disorders  
(20 sources)                            Overweight in   
adulthood with body   
mass index of 25 or   
more but less than   
30; Translations:   
[Overweight]                            Onset:   
10-                11-                Episodic  
   
                                                    Other nutritional;   
endocrine; and   
metabolic disorders  
(2 sources)                             Overweight;   
Translations:   
[Overweight]                            Onset:   
10-                                          Episodic  
   
                                                    Other nutritional;   
endocrine; and   
metabolic disorders  
(2 sources)                             Body mass index   
(BMI) 27.0-27.9,   
adult; Translations:   
[Body mass index   
(BMI) 27.0-27.9,   
adult]                                  Onset:   
10-                                          Episodic  
   
                                                    Reba-; endo-; and   
myocarditis;   
cardiomyopathy (except   
that caused by   
tuberculosis or   
sexually transmitted   
disease)  
(20 sources)                            Cardiomyopathy;   
Translations: [Other   
primary   
cardiomyopathies]                       Onset:   
2022                Chronic  
   
                                                    Screening and history   
of mental health and   
substance abuse codes  
(20 sources)                            Ex-smoker;   
Translations:   
[Personal history of   
tobacco use]                            Onset:   
12-                10-                Episodic  
   
                                        Comment on above:   quit somking at age   
34;   
   
                                                    Substance-related   
disorders  
(2 sources)                             Opioid abuse,   
uncomplicated;   
Translations:   
[Cocaine abuse, in   
remission]                              Onset:   
2022                                          Chronic  
   
                                                    Unclassified  
(1 source)                              CONTACT W/AND (SUSP)   
EXPOS COVID-19;   
Translations:   
[CONTACT W/AND   
(SUSP) EXPOS   
COVID-19]                               Onset:   
2022                                            
   
                                                    Unclassified  
(3 sources)                             COUGH, UNSPECIFIED;   
Translations:   
[COUGH, UNSPECIFIED]                    Onset:   
2022                                            
   
                                                    Unclassified  
(1 source)                              I48.91 - Unspecified   
atrial fibrillation;   
Translations:   
[I48.91 -   
Unspecified atrial   
fibrillation]                           Onset:   
2022                                            
   
                                                    Unclassified  
(1 source)                              Z01.812 - Encounter   
for preprocedural   
laboratory   
examination;   
Translations:   
[Z01.812 - Encounter   
for preprocedural   
laboratory   
examination]                            Onset:   
2022                                            
   
                                                    Unclassified  
(1 source)                              R06.00 - Dyspnea,   
unspecified;   
Translations:   
[R06.00 - Dyspnea,   
unspecified]                            Onset:   
2022                                            
   
                                                    Unclassified  
(1 source)                              Personal history of   
COVID-19;   
Translations:   
[Personal history of   
COVID-19]                               Onset:   
2022                                            
   
                                                    Viral infection  
(4 sources)                             COVID-19;   
Translations:   
[COVID-19]                              Onset:   
06-                                            
  
  
Past or Other Problems  
  
  
                      Problem Classification Problem    Date       Documented Da  
te Episodic/Chronic  
   
                                                    Diabetes mellitus   
without complication  
(1 source)                              Other abnormal   
glucose;   
Translations:   
[OTHER ABNORMAL   
GLUCOSE]                                Onset:   
2022                                          Episodic  
   
                                                    Immunizations and   
screening for   
infectious disease  
(1 source)                              Encounter for   
immunization;   
Translations:   
[ENCOUNTER FOR   
IMMUNIZATION]                           Onset:   
2022                                          Episodic  
   
                                                    Other aftercare  
(2 sources)                             Other long term   
(current) drug   
therapy;   
Translations: [OTH   
LONG TERM CURRENT   
DRUG THERAPY]                           Onset:   
2022                                          Episodic  
   
                                                    Other connective   
tissue disease  
(1 source)                              Unspecified rotator   
cuff tear or   
rupture of   
unspecified   
shoulder, not   
specified as   
traumatic;   
Translations: [UNS   
ROT CUFF TEAR/RUPT   
UNS SHOULDER]                           Onset:   
2022                                          Episodic  
   
                                                    Other non-traumatic   
joint disorders  
(4 sources)                             Pain in unspecified   
shoulder;   
Translations: [PAIN   
IN UNSPECIFIED   
SHOULDER]                               Onset:   
2022                                          Episodic  
   
                                                    Residual codes;   
unclassified  
(4 sources)                             Altered mental   
status,   
unspecified;   
Translations:   
[ALTERED MENTAL   
STATUS UNSPECIFIED]                     Onset:   
2022                                          Episodic  
   
                                                    Residual codes;   
unclassified  
(1 source)                              Procedure and   
treatment not   
carried out for   
other reasons;   
Translations:   
[Procedure and   
treatment not   
carried out for   
other reasons]                          Onset:   
2022                                          Episodic  
   
                                                    Unclassified  
(1 source)                              COUGH, UNSPECIFIED;   
Translations:   
[COUGH,   
UNSPECIFIED]                            Onset:   
2022                                            
   
                                                    Unclassified  
(1 source)                                          Onset:   
2023                  
  
  
  
Results  
  
  
                          Test Name    Value        Interpretation Reference   
Range                                   Facility  
   
                                                    Office Visit (Cardiology)on   
2023   
   
                                        Follow-up visit     Diagnoses/Problems  
Assessed  
Nonischemic   
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   
(I42.8)  
Atrial fibrillation   
(427.31) (I48.91)  
Overweight with body   
mass index (BMI) of 27   
to 27.9 in adult   
(278.02,V85.23)  
(E66.3,Z68.27)  
Former smoker (V15.82)   
(Z87.891)  
quit somking at age 34  
CHF (NYHA class II,   
ACC/AHA stage C) (428.0)   
(I50.9)  
Orders  
Atrial fibrillation,   
Atypical atrial flutter,   
CHF (NYHA class II,   
ACC/AHA stage C),  
Nonischemic   
cardiomyopathy  
Echocardiogram;   
Status:Hold For -   
Scheduling,Retrospective   
Authorization; Requested  
for:2023;  
CHF (NYHA class II,   
ACC/AHA stage C)  
Basic Metabolic Panel;   
Status:Active -   
Retrospective   
Authorization; Requested  
for:2023;  
Brain Natriuretic   
Peptide BNP;   
Status:Active -   
Retrospective   
Authorization; Requested  
for:2023;  
Overweight with body   
mass index (BMI) of 27   
to 27.9 in adult  
Healthy Weight Tips;   
Status:Complete -   
Retrospective   
Authorization; Done:   
2023  
Some eating tips that   
can help you lose   
weight.; Status:Complete   
- Retrospective  
Authorization; Done:   
2023  
SocHx: Former smoker  
Tobacco Use Screening;   
Status:Complete; Done:   
2023  
Patient Instructions  
Please bring all   
medicines, vitamins, and   
herbal supplements with   
you when you come to the   
office.  
Prescriptions will not   
be filled unless you are   
compliant with your   
follow up appointments   
or have a follow up   
appointment scheduled as   
per instruction of your   
physician. Refills   
should be requested at   
the time of your visit.  
Follow up in 6 months  
  
Chief Complaint  
ENMANUEL ARRINGTON is being seen   
for s/p ablation.  
77-year-old gentleman   
returns for follow-up   
following recent A-fib   
ablation with Dr. Hernandes, he is doing well   
from a cardiovascular   
standpoint with no   
shortness of breath,   
palpitations, recurrent   
heart failure. He   
remains on appropriate   
GDMT. He has a   
nonischemic   
cardiomyopathy by echo   
last year with ejection   
fraction of 40 to 45%.   
Stress perfusion exam   
last year was normal..   
He was subsequently   
referred for ablation   
which has proceeded.  
He has just returned   
from [place], he has put   
on a little weight after   
feeling better and   
restoration of normal   
sinus rhythm.  
NYHA classification is   
class II/C  
Recommendations,   
continue current   
therapies, continue   
Eliquis for the time   
being, obtain   
echocardiogram for LV   
functional assessment   
and follow-up with nurse   
practitioner within the   
next 6 months  
  
Surgical History   
Problems  
History of Catheter   
ablation  
History of Middle ear   
surgery  
History of Oral surgery  
History of Tonsillectomy  
Current Meds  
  
Medication   
NameInstruction  
Carvedilol 3.125 MG Oral   
Tablettake 1 tablet by   
mouth twice a day with   
MORNING AND EVENING MEAL  
Eliquis 5 MG Oral   
TabletTake 1 tablet   
twice daily  
Eplerenone 25 MG Oral   
TabletTake 1 tablet   
daily  
Losartan Potassium 25 MG   
Oral TabletTake 1 tablet   
daily  
Patient did not bring   
medication list or   
bottles. Updated   
verbally with patient  
Allergies  
Medication  
No Known Drug Allergies  
Recorded By: Almita Alford; 2022   
10:01:43 AM  
Social History  
Problems  
Consumes alcohol   
occasionally (V49.89)   
(Z78.9)  
Daily caffeine   
consumption  
2 cups of coffee daily,   
pop three times weekly  
Former smoker (V15.82)   
(Z87.891)  
quit somking at age 34  
Illicit drug use   
(305.90) (F19.90)  
oxycodone  
Review of Systems  
Constitutional: not   
feeling tired.  
Cardiovascular: no   
intermittent leg   
claudication and as   
noted in HPI.  
Respiratory: shortness   
of breath during   
exertion, but no cough   
and no shortness of   
breath.  
Gastrointestinal: no   
change in bowel habits   
and no blood in stools.  
Integumentary: no skin   
rashes.  
Neurological: no   
seizures and no frequent   
falls.  
All other systems have   
been reviewed and are   
negative for complaint.  
  
Vitals  
Vital Signs  
Recorded: 2023   
11:39AM  
Heart Rate62, R Radial  
Bpnwccwo587, LUE,   
Sitting  
Dqecvhwmn86, LUE,   
Sitting  
Height5 ft 7 in  
Vhaorv029 lb  
BMI Xurybdhyaa20.25   
kg/m2  
BSA Calculated1.91  
Tobacco Useb) No  
Falls Screening (Age   
18+)a) No falls within   
the last year  
Physical Exam  
Constitutional: alert   
and in no acute   
distress.  
Neck: neck is supple,   
symmetric, trachea   
midline, no masses and   
no thyromegaly .  
Pulmonary: no increased   
work of breathing or   
signs of respiratory   
distress and lungs clear   
to auscultation.  
Cardiovascular: carotid   
pulses 2+ bilaterally   
with no bruit , JVP was   
normal, no thrills ,   
regular rhythm, normal   
S1 and S2, no murmurs ,   
pedal pulses 2+   
bilaterally and no edema   
.  
Abdomen: abdomen   
non-tender, no masses   
and no hepatomegaly .  
Skin: skin warm and dry,   
normal skin turgor .  
Psychiatric judgment and   
insight is normal and   
oriented to person,   
place and time .  
  
Signatures  
Electronically signed by   
: Agustín Murphy DO;   
May 4 2023 1:02PM EST   
(Author)            Normal                                   iTagged  
   
                                                    Tobacco Screening.on   
023   
   
                                        Fall risk assessment a) No falls within   
the   
last year                                                   [facility]   
[employer]   
250 DO  
Work Phone:   
[phone]  
   
                                                    Tobacco use status   
[facility]            b) No                                           [facility]   
[facility]   
250 DO  
Work Phone:   
[phone]  
   
                                                    Electrocardiogram 12 Leadon   
2023   
   
                                                    Electrocardiogram 12   
Lead                                    Ventricular Rate  
82  
Atrial Rate  
82  
P-R Interval  
218  
QRS Duration  
86  
Q-T Interval  
358  
QTC Calculation(Bazett)  
418  
P Axis  
68  
R Axis  
43  
T Axis  
56  
QRS Count  
13  
Q Onset  
227  
P Onset  
118  
P Offset  
155  
T Offset  
406  
QTC Fredericia  
397  
Diagnosis Class  
Abnormal  
Diagnosis  
Sinus rhythm with 1st   
degree A-V block  
Cannot rule out Anterior   
infarct , age   
undetermined  
Abnormal ECG  
When compared with ECG   
of 2023 15:28,  
Sinus rhythm has   
replaced Atrial flutter  
Confirmed by Sonny Hernandez ([id number]) on   
2023 7:56:03 AM Normal                                  [facility]  
   
                                                    No Panel Informationon    
   
                                                            [email or web address]  
[email or web address]:  
[email or web address]  
.[email or web address]?[email or web address]  
laine?[email or web address]&  
Date=2023&Time=13%  
3a26%3a59%3a00&TestType=  
ECG&Site=1&OutputType=PD  
F&Ext=PDF                                                   [facility]   
[facility]   
250 DO  
Work Phone:   
[phone]  
   
                                                            Sinus rhythm with 1s  
t   
degree A-V block                                            [facility]   
[facility]   
250 DO  
Work Phone:   
[phone]  
   
                                 Abnormal                         [facility]   
[facility]   
250 DO  
Work Phone:   
[phone]  
   
                                 397 1                            [facility]   
[facility]   
250 DO  
Work Phone:   
[phone]  
   
                                 406 1                            [facility]   
[facility]   
250 DO  
Work Phone:   
[phone]  
   
                                 155 1                            [facility]   
[facility]   
250 DO  
Work Phone:   
[phone]  
   
                                 118 1                            [facility]   
[facility]   
250 DO  
Work Phone:   
[phone]  
   
                                 227 1                            [facility]   
[facility]   
250 DO  
Work Phone:   
[phone]  
   
                                 13 1                             [facility]   
[facility]   
250 DO  
Work Phone:   
[phone]  
   
                                 56 1                             [facility]   
[facility]   
250 DO  
Work Phone:   
[phone]  
   
                                 43 1                             [facility]   
[facility]   
250 DO  
Work Phone:   
[phone]  
   
                                 68 1                             [facility]   
[facility]   
250 DO  
Work Phone:   
[phone]  
   
                                 418 1                            [facility]   
[facility]   
250 DO  
Work Phone:   
[phone]  
   
                                 358 1                            [facility]   
[facility]   
250 DO  
Work Phone:   
[phone]  
   
                                 86 1                             [facility]   
[facility]   
250 DO  
Work Phone:   
[phone]  
   
                                 218 1                            [facility]   
[facility]   
250 DO  
Work Phone:   
[phone]  
   
                                 82 1                             [facility]   
[facility]   
250 DO  
Work Phone:   
[phone]  
   
                                                    Office Visit (Cardiology)on   
2023   
   
                                        Follow-up visit     Diagnoses/Problems  
Assessed  
Atrial fibrillation   
(427.31) (I48.91)  
Atypical atrial flutter   
(427.32) (I48.4)  
Orders  
Atrial fibrillation,   
Atypical atrial flutter  
Renew: Eliquis 5 MG Oral   
Tablet; Take 1 tablet   
twice daily  
Chief Complaint  
ENMANUEL ARRINGTON is being seen   
for a 3 month follow-up   
of atrial fibrillation.  
  
Adult Risk Screening  
There are no   
spiritual/cultural   
practices/values/needs   
that are important to   
know  
Initial Fall Risk   
Screening:  
ENMANUEL has not fallen in   
the last 6 months.  
Pain Scale: On a scale   
of 0 to 10, the patient   
rates the pain at 0.  
  
Living Will.  
Living Will: No living   
will on file.  
Healthcare POA: No   
healthcare proxy on   
file.  
Tobacco Screening: ENMANUEL   
does not use tobacco.  
  
History of Present   
Illness  
Enmanuel Arrington is a 78 y/o   
male initially referred   
by Dr Murphy for   
evaluation on AF.  
PMH includes NICM, R   
should rotator cuff   
injury, cocaine abuse x   
17 yrs (rehab in [place] now   
sober), oxycodone abuse   
(started in ) w/   
accidental overdose and   
AF.  
Treatment of his AF   
includes carvedilol. Pt   
has not required DCCV or   
AAD?s.  
Symptoms of his AF   
include palpitations and   
DELEON. .  
Pt established with Dr Murphy with the plan   
for DCCV. His DCCV was   
cancelled because pt had   
COVID.. Pt has a history   
of Oxycodone abuse which   
he was started on   
initially in  for a   
rotator cuff injury. Pt   
has been obtaining the   
medications on the   
street not through   
prescription. Pt had a   
recent accidental   
overdose which required   
ED visit an reversal   
with Narcan. Pt was take   
to rehab by his daughter   
about 1 week ago but   
left the same day.  
Pt reports that he has   
been sober since August  
Echo 2022: EF 40-45%,   
septal akinesis, LA   
mildly dilated  
Stress test 2022: no   
ischemia or myocardial   
infarction  
Now s/p PVI and adjuvant   
lesion RFA with Dr. Hernandes 2022  
Patient unfortunately   
caught a URI around   
Ana and also   
experienced a fever. He   
was started on   
antibiotics around   
ECG 2023 Atypical   
Aflutter HR 95 bpm.  
S/P DCCV with Dr. Murphy  
ECG 3/27/2023 NSR with   
first degree AV block,   
HR 82 bpm  
TODAY Patient presents   
for 3 month follow up   
post Afib ablation. He   
is feeling good. He   
denies any arrhythmia   
symptoms since his   
cardioversion. Per his   
wife, he still gets   
tired throughout the day   
and takes daily naps.   
But overall, he no   
longer feels   
palpitations or DELEON.  
  
Active Problems  
Problems  
Atrial fibrillation   
(427.31) (I48.91)  
Atypical atrial flutter   
(427.32) (I48.4)  
Dyspnea on exertion   
(786.09) (R06.09)  
Former smoker (V15.82)   
(Z87.891)  
quit somking at age 34  
Nonischemic   
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   
(I42.8)  
Overweight with body   
mass index (BMI) of 25   
to 25.9 in adult   
(278.02,V85.21)  
(E66.3,Z68.25)  
Surgical History  
Problems  
History of Middle ear   
surgery  
History of Oral surgery  
History of Tonsillectomy  
Current Meds  
  
Medication   
NameInstruction  
Carvedilol 3.125 MG Oral   
Tablettake 1 tablet by   
mouth twice a day with   
MORNING AND EVENING MEAL  
Eliquis 5 MG Oral   
TabletTake 1 tablet   
twice daily  
Eplerenone 25 MG Oral   
TabletTAKE 1 TABLET   
DAILY.  
Losartan Potassium 25 MG   
Oral TabletTAKE 1 TABLET   
DAILY AS DIRECTED.  
Allergies  
Medication  
No Known Drug Allergies  
Recorded By: Almita Alford; 2022   
10:01:43 AM  
Family History  
Mother  
Family history of lung   
cancer (V16.1) (Z80.1)  
Father  
Family history of   
cerebrovascular accident   
(CVA) (V17.1) (Z82.3)  
FH: CABG (coronary   
artery bypass surgery)   
(V17.3) (Z82.49)  
Sibling  
Family history of lung   
cancer (V16.1) (Z80.1)  
Social History  
Problems  
Consumes alcohol   
occasionally (V49.89)   
(Z78.9)  
Daily caffeine   
consumption  
2 cups of coffee daily,   
pop three times weekly  
Former smoker (V15.82)   
(Z87.891)  
quit somking at age 34  
Illicit drug use   
(305.90) (F19.90)  
oxycodone  
Review of Systems  
Constitutional: not   
feeling tired.  
Eyes: no eyesight   
problems.  
ENT: no hearing loss and   
no nosebleeds.  
Cardiovascular: no   
intermittent leg   
claudication and as   
noted in HPI.  
Respiratory: no chronic   
cough and no shortness   
of breath.  
Gastrointestinal: no   
change in bowel habits   
and no blood in stools.  
Genitourinary: no   
urinary frequency and no   
hematuria.  
Skin: no skin rashes.  
Neurological: no   
seizures and no frequent   
falls.  
Psychiatric: no   
depression and not   
suicidal.  
All other systems have   
been reviewed and are   
negative for complaint.  
  
Vitals  
Vital Signs  
Recorded: 2023   
01:18PM  
Heart Rate87  
Qysekjtb781, LUE,   
Sitting  
Dlkzbaqag41, LUE,   
Sitting  
Height5 ft 7 in  
Qcvzil589 lb 8 oz  
BMI Wwmiiwfsdn06.49   
kg/m2  
BSA Calculated1.91  
Tobacco Useb) No  
PHQ-2 #1. Over the last   
2 weeks have you felt   
down, depressed or   
hopeless? (If yes,   
answer PHQ-9 below)No  
PHQ-2 #2. Over the last   
2 weeks have you felt   
little interest or   
pleasure in doing   
things? (If yes, answer   
PHQ-9 below)No  
Falls Screening (Age   
18+)a) No falls within   
the last year  
O2 Byimascumo17, RA  
Pain Scale0  
Physical Exam  
Constitutional: alert   
and in no acute   
distress.  
Neck: neck (more content   
not included)...    Normal                                   [facility]  
   
                                                    Tobacco Screening.on   
023   
   
                                                    Adult depression   
screening assessment No                                              [facility]  
[facility]   
[address]   
[place]  
Work Phone:   
[phone]  
   
                                        Fall risk assessment a) No falls within   
the   
last year                                                   [facility]  
[facility] [address]   
[place]  
Work Phone:   
[phone]  
   
                                                    Tobacco use status   
CP            b) No                                           [facility]  
[facility]   
[address]   
[place]  
Work Phone:   
[phone]  
   
                                                    ECG 12 lead ECGon 2023  
   
   
                                        ECG 12 lead ECG     [facility] [place] [id number]  
  
Electrocardiograph   
Report  
Signed  
  
Patient: Enmanuel Arrington   
MR#: [id number]  
  
: 1945   
Acct:[id number]  
  
Age/Sex: 77 / M ADM   
Date: 23  
  
Loc: [facility] Room: Type: [facility]   
SD[facility]  
Attending Dr: LAURA Murphy DO  
  
Ordering Provider:   
George Leonard MD,   
[facility]  
Date of Service:   
23  
Accession #:   
([id number]) ECG/ECG 12   
lead ECG:   
Pre-cardioversion rhythm   
assessment  
  
  
Copies to:  
  
  
Test Reason :  
Blood Pressure : ***/***   
mmHG  
Vent. Rate : 104 BPM   
Atrial Rate : 258 BPM  
P-R Int : 000 ms QRS Dur   
: 074 ms  
QT Int : 328 ms P-R-T   
Axes : 000 034 025   
degrees  
QTc Int : 431 ms  
  
Atrial flutter with   
variable AV block  
Abnormal ECG  
When compared with ECG   
of 2022 08:01,  
Atrial flutter has   
replaced Atrial   
fibrillation  
Vent. rate has increased   
BY 46 BPM  
Confirmed by JASMYNE ARIZMENDI   
[facility], GEORGE ([id number]) on   
2023 10:03:22 AM  
  
Referred By: LAURA Murphy Electronically   
Signed By:GEORGE LEONARD MD [facility]  
  
  
  
Transcribed By: MUS  
Signed By George Leonard MD, [facility]  
23 1003       Normal                                  [facility]  
   
                                                    ECG post procedureon   
023   
   
                                        ECG post procedure  [facility] [place] [id number]  
  
Electrocardiograph   
Report  
Signed  
  
Patient: Enmanuel Arrington   
MR#: [id number]  
  
: 1945   
Acct:[id number]  
  
Age/Sex: 77 / M ADM   
Date: 23  
  
Loc: [facility] Room: Type: [facility]  
Attending Dr: LAURA Murphy DO  
  
Ordering Provider: LAURA Murphy DO  
Date of Service:   
23/  
Accession #:   
([id number]) ECG/ECG   
post procedure: post   
cardioversion  
  
  
Copies to:  
  
  
Test Reason :  
Blood Pressure : 131/089   
mmHG  
Vent. Rate : 084 BPM   
Atrial Rate : 084 BPM  
P-R Int : 240 ms QRS Dur   
: 084 ms  
QT Int : 368 ms P-R-T   
Axes : 022 036 021   
degrees  
QTc Int : 434 ms  
  
Sinus rhythm with marked   
sinus arrhythmia with   
1st degree AV block  
Otherwise normal ECG  
When compared with ECG   
of 2023 10:01,   
(Unconfirmed)  
Sinus rhythm has   
replaced Atrial flutter  
Confirmed by JASMYNE ARIZMENDI   
FAC, GEORGE ([id number]) on   
2023 10:03:28 AM  
  
Referred By: LAURA Murphy Electronically   
Signed By:GEORGE LEONARD MD [facility]  
  
  
  
Transcribed By: MUS  
Signed By George Leonard MD, FACC  
23 1003       Normal                                  [facility]  
   
                                                    Electrolyteson 2023   
   
                                                    Anion gap   
[Moles/Vol]     12.4 mmol/L     Normal          6.0-15.0        [facility]  
   
                                        Comment on above:   Result Comment: PERF  
ORMED BY:  
[place] [address]  
[phone]  
PATHOLOGIST MEDICAL DIRECTOR  
NAKITA BLOUNT M.D.   
   
                                                            Performed By: #### L  
YTES ####  
[facility]  
[address] [place] [address] [place]   
   
                      Chloride [Moles/Vol] 102 mmol/L Normal          [facility]  
   
                                        Comment on above:   Performed By: #### L  
YTES ####  
[facility]  
[address] [place] [address] [place]   
   
                      CO2 [Moles/Vol] 24.8 mmol/L Normal     22.0-30.0  [facility]  
   
                                        Comment on above:   Performed By: #### L  
YTES ####  
[facility]  
[address] [place] [address] [place]   
   
                                                    Potassium   
[Moles/Vol]     4.2 mmol/L      Normal          3.5-5.1         [facility]  
   
                                        Comment on above:   Performed By: #### L  
YTES ####  
[facility]  
[address] [place] [address] [place]   
   
                      Sodium [Moles/Vol] 135 mmol/L Low        136-146    [facility]  
   
                                        Comment on above:   Performed By: #### L  
YTES ####  
[facility]  
[address] [place] [address] [facility]   
   
                                                    No Panel Informationon    
   
                                 12.4\S\12.4 Normal     6.0-15.0   [facility]  
   
[employer]   
[address] DO  
Work Phone:   
[phone]  
   
                                        Comment on above:   PERFORMED BY:[facility] [place], [place] [id number]ICAL   
DIRECTORNAKITA BLOUNT M.D.   
   
                                 24.8\S\24.8 Normal     22.0-30.0  [facility]  
   
[employer]   
[address] DO  
Work Phone:   
[phone]  
   
                                 102\S\102  Normal          [facility] [facility]   
[employer]   
250 DO  
Work Phone:   
[phone]  
   
                                 4.2\S\4.2  Normal     3.5-5.1    [facility]   
[employer]   
250 DO  
Work Phone:   
[phone]  
   
                                                135\S\135       below low   
threshold                 136-146                   [facility]   
[employer]   
250 DO  
Work Phone:   
[phone]  
   
                                                    Electrocardiogram 12 Leadon   
2023   
   
                                                    Electrocardiogram 12   
Lead                                    Ventricular Rate  
95  
Atrial Rate  
250  
QRS Duration  
78  
Q-T Interval  
356  
QTC Calculation(Bazett)  
447  
R Axis  
36  
T Axis  
33  
QRS Count  
16  
Q Onset  
222  
T Offset  
400  
QTC Fredericia  
414  
Diagnosis Class  
Abnormal  
Diagnosis  
Atrial flutter with   
variable A-V block  
Abnormal ECG  
When compared with ECG   
of 14-DEC-2022 09:07,  
Atrial flutter has   
replaced Sinus rhythm  
ST no longer elevated in   
Inferior leads  
T wave amplitude has   
increased in Anterior   
leads  
Confirmed by Brett Correa ([id number]) on 2023   
10:20:21 AM         Normal                                  [facility]  
   
                                                    Office Visit (Cardiology)on   
2023   
   
                                        Follow-up visit     Diagnoses/Problems  
Assessed  
Atypical atrial flutter   
(427.32) (I48.4)  
Atrial fibrillation   
(427.31) (I48.91)  
Nonischemic   
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   
(I42.8)  
Orders  
Atrial fibrillation,   
Nonischemic   
cardiomyopathy  
Renew: Carvedilol 3.125   
MG Oral Tablet; take 1   
tablet by mouth twice a   
day with  
MORNING AND EVENING MEAL  
Atypical atrial flutter  
Cardioversion;   
Status:Active; Requested   
for:2023;  
Chief Complaint  
ENMANUEL ARRINGTON is being seen   
for a 3 week follow-up   
of atrial fibrillation,   
atrial flutter and s/p   
PVI with adjuvant lesion   
RFA with Dr. Hernandes   
2022.  
  
Adult Risk Screening  
There are no   
spiritual/cultural   
practices/values/needs   
that are important to   
know  
Initial Fall Risk   
Screening:  
ENMANUEL has not fallen in   
the last 6 months. ENMANUEL   
does not have a fear of   
falling. He does not   
need assistance with   
sitting, standing or   
walking. Does not need   
assistance walking in   
his home. He does not   
need assistance in an   
unfamiliar setting. The   
patient is not using an   
assistive device.  
Pain Scale: On a scale   
of 0 to 10, the patient   
rates the pain at 0.  
  
Living Will.  
Living Will: No living   
will on file.  
Healthcare POA: No   
healthcare proxy on   
file.  
Declaration of Mental   
Health Treatment: No   
mental health treatment   
on file.  
Tobacco Screening: ENMANUEL   
does not use tobacco.   
Patient   
Declined/Screening not   
indicated.  
Domestic Violence   
Screen: Does not feel   
threatened or abused   
physically, emotionally   
or sexually. Do you feel   
UNSAFE?  
The patient feels safe   
in the home.  
Depression/Suicide   
Screening:  
During the past 2 weeks,   
the patient has not felt   
down, depressed or   
hopeless.  
During the past 2 weeks,   
the patient has not felt   
little interest or   
pleasure in doing   
things.  
He does not have a risk   
of suicide.  
He has not had thoughts   
of harming others.  
Procedure or Sedation   
Areas:  
patient has not had   
alcohol, recreational   
drugs, or prescription   
drugs for non-medical   
reasons this morning.  
Nutrition Screening:  
In the past month, there   
was not a day when I or   
anyone in my family went   
hungry because there was   
not enough food.  
Patient Education: The   
patient denies that they   
or the person with them   
has problems with   
hearing, speaking,   
seeing, moving around or   
learning  
The patient is   
comfortable filling out   
medical forms.  
  
History of Present   
Illness  
Enmanuel Arrington is a 78 y/o   
male initially referred   
by Dr Murphy for   
evaluation on AF.  
PMH includes NICM, R   
should rotator cuff   
injury, cocaine abuse x   
17 yrs (rehab in [place] now   
sober), oxycodone abuse   
(started in ) w/   
accidental overdose and   
AF.  
Treatment of his AF   
includes carvedilol. Pt   
has not required DCCV or   
AAD?s.  
Symptoms of his AF   
include palpitations and   
DELEON.  
Pt recently followed up   
with his PCP in the end   
of April/early May and   
pt was noted to be in   
AF. Pt had a history of   
irregular heartbeat but   
no diagnosis of AF. Pt   
was started on Eliquis.  
Pt established with Dr Murphy with the plan   
for DCCV. His DCCV was   
cancelled because pt had   
COVID.. Pt has a history   
of Oxycodone abuse which   
he was started on   
initially in  for a   
rotator cuff injury. Pt   
has been obtaining the   
medications on the   
street not through   
prescription. Pt had a   
recent accidental   
overdose which required   
ED visit an reversal   
with Narcan. Pt was take   
to rehab by his daughter   
about 1 week ago but   
left the same day.  
Pt reports that he has   
been sober since August  
Echo 2022: EF 40-45%,   
septal akinesis, LA   
mildly dilated  
Stress test 2022: no   
ischemia or myocardial   
infarction  
Now s/p PVI and adjuvant   
lesion RFA with Dr. Hernandes 2022  
Patient unfortunately   
caught a URI around   
Ana and also   
experienced a fever. He   
was started on   
antibiotics around    
and has a couple of more   
days still before they   
are complete.  
ECG 2023 Atypical   
Aflutter HR 95 bpm.  
TODAY Patient presents   
for 3 week follow up   
post ablation. He denies   
any arrhythmia symptoms.   
He is still coughing,   
especially at night due   
to his URI, but the SOB   
is improving after the   
antibiotics. He denies   
any palpitations,   
syncope, heart racing,   
and chest pain. He   
denies any   
bleeding/bruising/swelli  
ng at the right groin   
site. His wife is   
present with him and   
says he is taking all   
his medication properly.   
She would like a refill   
on the Coreg with the   
correct dosage so she   
doesn't have to cut the   
pill in half  
  
Active Problems  
Problems  
Atrial fibrillation   
(427.31) (I48.91)  
Dyspnea on exertion   
(786.09) (R06.09)  
Former smoker (V15.82)   
(Z87.891)  
quit somking at age 34  
Nonischemic   
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   
(I42.8)  
Overweight with body   
mass index (BMI) of 25   
to 25.9 in adult   
(278.02,V85.21)  
(E66.3,Z68.25)  
Surgical History  
Problems  
History of Middle ear   
surgery  
History of Oral surgery  
History of Tonsillectomy  
Current Meds  
  
Medication   
NameInstruction  
Carvedilol 6.25 MG Oral   
TabletTAKE 0.5 TABLET   
Twice daily  
Eliquis 5 MG Oral   
TabletTake 1 tablet   
twice daily  
Eplerenone 25 MG Oral   
TabletTAKE 1 TABLET   
DAILY.  
Hibiclens 4 % External   
LiquidUse as d (more   
content not included)... Normal                                   iTagged  
   
                                                    Tobacco Screening.on   
023   
   
                                                    Tobacco use status   
[facility]            b) No                                           [facility]  
[facility] [address]   
[place]  
Work Phone:   
[phone]  
   
                                                    Daily Progress Note-Electrop  
hysiologyon 2022   
   
                                                    Daily Progress   
Note-Electrophysiolo  
gy                                      Service:   
Electrophysiology  
  
Subjective Data:  
ENMANUEL ARRINGTON is a 77   
year old Male who is   
Hospital Day # 2.  
  
Overnight Events:   
Patient had an   
uneventful night.  
Additional Information:  
-Pt was seen and   
examined at 9:10AM and   
tele reviewed  
-Pt was seen by EP   
fellow Nando Johnson MD   
this AM also  
-denies   
CP/dyspnea/LH/palps; ate   
breakfast, states feels   
 really good, the best  
since July   
-Has voided and   
ambulated without issues  
  
  
Objective Data:  
  
Objective Information:  
T PRBPMAPSpO2  
Value36.12450077/8798%  
Date/Time 7:   
7: 7:   
7: 7:00  
Range(36.2C - 36.5C )   
(74 - 84 ) (18 - 19 )   
(119 - 152 )/ (66 - 87 )  
(95% - 98% )  
  
  
Tele reviewed: SR   
70s-80s, occas PACs  
  
EKG post ablation   
22 reviewed (much   
artifact and wandering   
baseline but  
SR/SA, , QRS 84,   
QTc 437, appears no   
acute changes)  
  
Physical Exam by System:  
  
Constitutional: A+O x 3,   
no distress  
Eyes: Anicteric  
Head/Neck: NC/AT  
Respiratory/Thorax:   
harsh bases o/w CTAB  
Cardiovascular: RRR, no   
rub  
Gastrointestinal: soft,   
NT/ND +BS  
Extremities: no LE edema  
  
RFV access site-no   
bleeding, hematoma, or   
ecchymosis  
Psychological:   
Appropriate speech and   
affect.  
Skin: Warm and dry  
  
Medication:  
  
Medications:  
  
Continuous Medications  
------------------------  
--------  
No continuous   
medications are active  
  
Scheduled Medications  
------------------------  
--------  
  
1. Apixaban: 5 mg Oral   
Every 12 Hours  
2. Carvedilol: 3.125 mg   
Oral 2 Times a Day  
3. Eplerenone: 25 mg   
Oral Daily  
4. Furosemide: 40 mg   
Oral Daily  
5. Pantoprazole: 40 mg   
Oral 2 Times a Day  
  
PRN Medications  
------------------------  
--------  
  
1. Ibuprofen: 400 mg   
Oral Every 8 Hours  
2. Sodium Chloride 0.9%   
Injectable Flush: 10 mL   
IntraVenous Flush Every   
8  
Hours and as Needed  
  
  
Assessment and Plan:  
Code Status:  
Code StatusFull Code  
  
Advance Care Planning:  
Advance Care Planning:   
Patient didn't wish to   
or was unable to provide   
advance  
care plan  
  
Assessment:  
76yo M with NICM EF   
40-45% by Echo at   
Count includes the [facility] 2022   
(Lexiscan nuc stress  
2022 negative for   
ischemia/infarct; not   
gated due to AF), COVID   
recovered,  
MARTINE (cocaine, now sober;   
oxycodone s/p 30d rehab   
2022), and AF on   
Eliquis.  
Follows with cards Dr Agustín Murphy.   
Yesterday underwent   
PVI/adjuvant  
lesions, L-sided AFL   
ablation and CTI line   
(see report). Feels well   
today and  
in NSR. Home today per   
EP fellow.  
  
#AF, s/p PVI, and   
ablation of 2 AFLs   
22; on Eliquis,   
PPI x 1 month for  
esophageal ppx  
#NICM-->on Coreg,   
losartan, eplerenone,   
Lasix  
  
-MED changes: Protonix   
40mg BID x 4 wks post   
ablation (eRx sent by   
fellow) o/w  
no changes in home meds  
-post procedure written   
instructions reviewed   
with pt and all   
questions  
answered  
-Pt and wife were   
advised on importance of   
adherence to Eliquis   
regimen without  
interruption for the 1st   
30 days post ablation   
(unless in event of   
emergency)  
-Pt was advised to cont   
regular F/u with primary   
cards Dr Murphy for  
medication optimization  
-F/u PCP 2 wks (pt to   
call)  
-EP F/u 1 month with   
Sivan Schuler CNP on   
22 in [place]  
  
DORITA Conklin,   
PAConstantniC, [facility]  
Inpatient Cardiac   
Electrophysiology  
Personal pager: [phone]   
()  
  
  
  
  
Electronic Signatures:  
Gustavo Diehl (PAC)   
(Signed 14-Dec-2022   
10:10)  
Authored: Service,   
Subjective Data,   
Objective Data,   
Assessment and Plan,   
Note  
Completion  
  
  
Last Updated:   
14-Dec-2022 10:10 by   
Gustavo Diehl (PAC) Normal                                  [facility]  
   
                                                    Discharge Planning [id number]  
2022   
   
                                                    Discharge Planning   
Note2                                   Discharge Planning:  
Anticipated Discharge   
[id number]  
Discharge Planning  
2022 Discharge   
note. Pt stable for d/c.   
No pain reported.   
Reviewed  
discharge instructions   
with patient and wife.   
Answered any questions.   
IVs and  
telemetry removed. Pt   
discharged to home   
accompanied by wife via   
personal car.  
Pt has all personal   
items. Pt to leave unit   
via wheelchair.  
  
Assessment:  
Discharge Planning   
Assessment   
Date14-Dec-2022  
Lives Withspouse(1)  
Living   
Arrangementshouse(1)  
Stated Reason for   
AdmissionA-flutter   
ablation(1)  
Arrived Fromhome (1)  
Resource/Environmental   
Concernsnone(1)  
Anticipated Transition   
[place](1)  
Services Anticipated at   
Transitionnon(1)  
  
Discharge Documentation:  
Discharge/Transfer   
Date/Time14-Dec-2022   
11:20  
Discharged Accompanied   
Byspouse  
Transportation   
Methodprivate car  
Discharge Modewheelchair  
Code StatusCode Status   
order at time of   
discharge: Full Code  
Discharge Order   
Writtenyes  
[place] DNR Form Sent with   
Patient and/or Familyno  
Valuables/Medications/Be  
longings Returnedyes  
Final Disposition.Home  
  
  
  
Electronic Signatures:  
Emely Richardson (RN)   
(Signed 14-Dec-2022   
11:21)  
Authored: Discharge   
Planning, Assessment,   
Discharge Documentation  
  
  
Last Updated:   
14-Dec-2022 11:21 by   
Emely Richardson (CHEKO)  
  
References:  
1. Data Referenced From   
 Patient Profile - Adult   
v2  13-Dec-2022 22:11 Normal                                  [facility]  
   
                                                    Electrocardiogram 12 Leadon   
2022   
   
                                                    Electrocardiogram 12   
Lead                                    Ventricular Rate  
83  
Atrial Rate  
81  
QRS Duration  
80  
Q-T Interval  
352  
QTC Calculation(Bazett)  
413  
R Axis  
32  
T Axis  
48  
QRS Count  
14  
Q Onset  
224  
T Offset  
400  
QTC Fredericia  
392  
Diagnosis Class  
Abnormal  
Diagnosis  
Sinus rhythm Sinus   
arrhythmia with 1st   
degree A-V block  
Low voltage QRS,   
consider pulmonary   
disease, pericardial   
effusion, or normal   
variant  
Nonspecific ST   
abnormality  
Abnormal ECG  
No previous ECGs   
available  
Confirmed by Sachin Gu ([id number]) on   
2022 12:11:06 PM Normal                                  [facility]  
   
                                                    No Panel Informationon    
   
                                                            [email or web address]  
[email or web address]  
[email or web address]  
[email or web address]?[email or web address]  
laine?[email or web address]&  
Date=2022&Time=09%  
3a07%3a40%3a00&TestType=  
ECG&Site=1&OutputType=PD  
F&Ext=PDF                                                   [facility]  
[facility]   
[address]   
[place]  
Work Phone:   
[phone]  
   
                                                            Sinus rhythm Sinus   
arrhythmia with 1st   
degree A-V block                                            [facility]  
[facility]   
[address]   
[place]  
Work Phone:   
[phone]  
   
                                 Abnormal                         [facility]  
[facility]   
[address]   
[place]  
Work Phone:   
[phone]  
   
                                 [address]                            [facility]  
[facility]   
[address]   
[place]  
Work Phone:   
[phone]  
   
                                 [address]                            [facility]  
[facility]   
[address]   
[place]  
Work Phone:   
[phone]  
   
                                 224 1                            [facility]  
[facility]   
[address]   
[place]  
Work Phone:   
[phone]  
   
                                 14 1                             [facility]  
[facility]   
[address]   
[place]  
Work Phone:   
[phone]  
   
                                 48 1                             [facility]  
[facility]   
[address]   
[place]  
Work Phone:   
[phone]  
   
                                 32 1                             [facility]  
[facility]   
[address]   
[place]  
Work Phone:   
[phone]  
   
                                 [address]                            [facility]  
[facility]   
[address]   
[place]  
Work Phone:   
[phone]  
   
                                 [address] 1                            [facility]  
[facility]   
[address]   
[place]  
Work Phone:   
[phone]  
   
                                 80 1                             [facility]  
[facility]   
[address]   
[place]  
Work Phone:   
[phone]  
   
                                 81 1                             [facility]  
[facility]   
[address]   
[place]  
Work Phone:   
[phone]  
   
                                 83 1                             [facility]  
[facility] [address]   
[place]  
Work Phone:   
[phone]  
   
                                                    Order Reconciliationon    
   
                                        Order Reconciliation Page 1  
  
Discharge Reconciliation   
Document  
  
  
  
Reconciliation Type:   
Discharge requested on   
behalf of Jessica Johnson  
(Resident) done by   
Jessica Johnson (MD   
(Resident))  
  
Discharge -   
Reconciliation:   
14-Dec-2022 08:00 by:   
Jessica Johnson (MD  
(Resident))  
  
Home Medications   
EnteredHOME MEDICATIONS   
AT DISCHARGE   
DateReconciliation  
Comment/ Additional   
Information  
acetaminophen 500 mg   
oral tablet 2 tab(s)   
orally every 6 hours, As   
Needed  
13-Dec-2022 13:32   
acetaminophen 500 mg   
oral tablet 2 tab(s)   
orally every 6  
hours, As Needed   
13-Dec-2022 13:32   
acetaminophen 500 mg   
oral tablet is  
continued as   
acetaminophen 500 mg   
oral tablet  
apixaban 5 mg oral   
tablet 1 tab(s) orally 2   
times a day 13-Dec-2022   
13:27  
apixaban 5 mg oral   
tablet 1 tab(s) orally 2   
times a day 13-Dec-2022   
13:27  
apixaban 5 mg oral   
tablet is continued as   
apixaban 5 mg oral   
tablet  
carvedilol 6.25 mg oral   
tablet 1 tab(s) orally 2   
times a day (with meals)  
13-Dec-2022 13:30   
Discontinued;   
Discontinue from ORM  
  
carvedilol 6.25 mg oral   
tablet is not required  
eplerenone 25 mg oral   
tablet 1 tab(s) oral   
once a day 2022   
12:39  
eplerenone 25 mg oral   
tablet 1 tab(s) oral   
once a day 2022   
12:39  
eplerenone 25 mg oral   
tablet is continued as   
eplerenone 25 mg oral   
tablet  
furosemide 40 mg oral   
tablet 1 tab(s) orally   
once a day 13-Dec-2022   
13:32  
furosemide 40 mg oral   
tablet 1 tab(s) orally   
once a day 13-Dec-2022   
13:32  
furosemide 40 mg oral   
tablet is continued as   
furosemide 40 mg oral   
tablet  
losartan 25 mg oral   
tablet 1 tab(s) oral   
once a day 2022   
12:39  
losartan 25 mg oral   
tablet 1 tab(s) oral   
once a day 2022   
12:39  
losartan 25 mg oral   
tablet is continued as   
losartan 25 mg oral   
tablet  
multivitamin Multiple   
Vitamins oral tablet 1   
tab(s) oral once a day  
2022 12:39   
multivitamin Multiple   
Vitamins oral tablet 1   
tab(s) oral  
once a day 2022   
12:39 multivitamin   
Multiple Vitamins oral   
tablet is  
continued as   
multivitamin Multiple   
Vitamins oral tablet  
omeprazole 40 mg oral   
delayed release capsule   
1 cap(s) orally once a   
day, As  
Needed 13-Dec-2022 13:49   
Discontinued;   
Discontinue from ORM  
  
omeprazole 40 mg oral   
delayed release capsule   
is not required  
  
  
Current OrdersDateHOME   
MEDICATIONS AT DISCHARGE   
DateReconciliation   
Comment/  
Additional Information  
Apixaban Tablet   
(ELIQUIS)DOSE = 5 mg   
Oral Every 12 Hours   
14-Dec-2022 04:23  
Apixaban is not required  
Carvedilol Tablet   
(COREG)DOSE = 3.125 mg   
Oral 2 Times a Day   
13-Dec-2022  
18:49 carvedilol 3.125   
mg oral tablet 1 tab(s)   
orally 2 times a day  
14-Dec-2022 07:59   
Carvedilol is continued   
as carvedilol 3.125 mg   
oral tablet  
and modified  
Eplerenone Tablet   
(INSPRA)DOSE = 25 mg   
Oral Daily 13-Dec-2022   
18:49  
Eplerenone is not   
required  
Furosemide Tablet   
(LASIX)DOSE = 40 mg Oral   
Daily 13-Dec-2022 18:49  
Furosemide is not   
required  
Ibuprofen Tablet (ADVIL,   
MOTRIN)DOSE = 400 mg   
Oral Every 8 Hours, PRN   
Pain -  
Mild (1-3) 13-Dec-2022   
18:39 Ibuprofen is not   
required  
Pantoprazole Enteric   
Coated Tablet   
(PROTONIX)DOSE = 40 mg   
Oral 2 Times a Day  
13-Dec-2022 18:49   
pantoprazole 40 mg oral   
delayed release tablet 1   
tab(s)  
orally 2 times a day   
14-Dec-2022 08:00   
Prescription is created   
for  
pantoprazole 40 mg oral   
delayed release tablet  
Sodium Chloride 0.9%   
Injectable Flush via   
Peripheral LineVolume =   
10 mL  
IntraVenous Flush Every   
8 Hours and as Needed   
13-Dec-2022 18:39  
Sodium Chloride 0.9%   
Injectable Flush is not   
required  
  
  
All Active Home   
Medications at time of   
Discharge   
Reconciliation:   
14-Dec-2022  
08:00  
acetaminophen 500 mg   
oral tablet 2 tab(s)   
orally every 6 hours, As   
Needed  
apixaban 5 mg oral   
tablet 1 tab(s) orally 2   
times a day  
carvedilol 3.125 mg oral   
tablet 1 tab(s) orally 2   
times a day  
eplerenone 25 mg oral   
tablet 1 tab(s) oral   
once a day  
furosemide 40 mg oral   
tablet 1 tab(s) orally   
once a day  
losartan 25 mg oral   
tablet 1 tab(s) oral   
once a day  
multivitamin Multiple   
Vitamins oral tablet 1   
tab(s) oral once a day  
pantoprazole 40 mg oral   
delayed release tablet 1   
tab(s) orally 2 times a   
day                 Normal                                  [facility]  
   
                                                    Patient Profile - Adult v2on  
 2022   
   
                                                    Patient Profile -   
Adult v2                                Profile:  
Initial Info:  
How to be AddressedBarry  
Spoken Language   
PreferredEnglish  
Source of   
Informationpatient  
Stated Reason for   
AdmissionA-flutter   
ablation  
Wants Family/Rep   
Notified of   
Admissionn/a; family   
present  
Notify PCPnotify PCP  
Informed of Patient   
Visiting Rightsyes  
[place]  
[place]   
Belongingsremains with   
patient  
Medications Brought to   
Hospitalno  
  
General Health:  
Weight in kg77.7   
kilogram(s)  
Weight in frg978.2   
pound(s)  
Weight Methodactual   
(measured)  
Scale Typestanding  
Height in cm167.5   
centimeter(s)  
Height in feet5 feet  
Height in inches5.98   
inch(es)  
Height Methodstated  
BMI (kg/m2)27.694 square   
meter  
  
RSP Based Care:  
How would you like to   
participate in your   
carestay informed  
What is the number one   
concern for you during   
this hospitalizationstay  
informed  
What is the most   
important thing we can   
do to support you during   
this  
hospitalizationstay   
informed  
Is there anything we   
need to know to best   
care for youstay   
informed  
  
Substance:  
Smoking Statusformer   
smoker  
Drug Usehistory of abuse  
  
Health Mgmt:  
Symptoms/Conditions   
Managed at   
Homecardiovascular  
Cardiovascular   
Managementmanaged  
  
Relationship/Environ:  
Resource/Environmental   
Concernsnone  
Primary Source of   
Support/Comfortspouse  
Lives Withspouse  
Living Arrangementshouse  
Services Anticipated at   
Transitionnone  
[facility]   
[facility]  
Significant   
IndicatorsComplete  
  
  
  
Information Review:  
Allergies, Home Meds and   
Significant Events have   
been Reviewed and   
Verified  
with Patient/Familyyes  
  
ALLERGY, INTOLERANCE,   
ADVERSE EVENT:  
Allergies:  
No Known Allergies:   
Active  
  
  
Electronic Signatures:  
Melvina Kim) (Signed 13-Dec-2022   
22:18)  
Authored: Initial Info,   
General Health, RSP   
Based Care, Substance,   
Health  
Mgmt,   
Relationship/Environ,   
Additional Information  
  
  
Last Updated:   
13-Dec-2022 22:18 by   
Melvina Kim)                Normal                                  [facility]  
   
                                                    ACT-HIGH RANGEon 2022   
   
                      ACT-HIGH RANGE 365 SECONDS High       96 - 152   [facility]  
   
                                        Comment on above:   Result Comment: Note  
 new reference range as of 2019.  
Target ACT range will vary based on the patient population,  
clinical status, and surgical intervention occurring.   
   
                                                            Performed By: #### A  
CTP ####[id number] [place] [address]   
   
                      ACT-HIGH RANGE 354 SECONDS High       96 - 152   [facility]  
   
                                        Comment on above:   Result Comment: Note  
 new reference range as of 2019.  
Target ACT range will vary based on the patient population,  
clinical status, and surgical intervention occurring.   
   
                                                            Performed By: #### A  
CTP ####  
[facility]  
[id number]  
[place] [id number]   
   
                      ACT-HIGH RANGE 360 SECONDS High       96 - 152   [facility]  
   
                                        Comment on above:   Result Comment: Note  
 new reference range as of 2019.  
Target ACT range will vary based on the patient population,  
clinical status, and surgical intervention occurring.   
   
                                                            Performed By: #### A  
CTP ####  
[facility]  
[id number]  
[place] [id number]   
   
                      ACT-HIGH RANGE 361 SECONDS High       96 - 152   [facility]  
   
                                        Comment on above:   Result Comment: Note  
 new reference range as of 2019.  
Target ACT range will vary based on the patient population,  
clinical status, and surgical intervention occurring.   
   
                                                            Performed By: #### A  
CTP ####[id number] [id number]   
   
                                                    Admission Risk Screen - Adul  
ton 2022   
   
                                                    Admission Risk   
Screen - Adult                          Allergies:  
Allergies:  
No Known Allergies:  
  
Patient Verification:  
New W ID Band Applied in   
my Departmentyes  
Patient Identity   
Verified Bypatient  
ID Band FULL Name,   
include Middle, spelling   
matches patient's ID   
used for  
verificationyes  
ID Band  Matches   
Patient ID used for   
Verficationyes  
ID Band MRN Matches EMR   
MRNyes  
  
Visitor Restriction:  
Coronavirus Visitor   
Restriction: Reasonable   
restrictions to   
in-person  
visitors will be   
observed due to current   
coronavirus pandemic.  
  
Travel History:  
COVID-19 Screening   
Completedno exposure or   
symptoms  
Travel or Exposure Past   
30 DaysNO travel to   
International locations   
in the  
past 30 days  
Ebola AlertFor   
Ebola-like Symptoms:   
Isolate Patient and   
Notify  
Provider/Infection   
Preventionist  
  
For Contact: Notify   
Provider/Infection   
Preventionist  
  
Advance Directive:  
Advance Directive/DNRno  
Advance Directive   
Information   
Givenpatient/family   
declined  
  
Rodríguez Fall Screen:  
History of falling   
(immediate or   
previous)no (0)  
Secondary Diagnosisyes   
(15)  
Intravenous Therapy/   
Heparin/Saline Lockyes   
(20)  
Gait/Transferringnormal/  
bedrest/wheelchair (0)  
Ambulatory   
Aidsnone/bedrest/nurse   
assist (0)  
Mental Statusoriented to   
own ability (0)  
Score: Low risk (<25).   
Moderate risk (25-44).   
High risk (>44).35  
Rodríguez   
InterventionsMODERATE   
INTERVENTIONS:  
*Low Interventions Plus:  
* falls risk   
band/sticker applied to   
patient,  
*yellow non-skid   
footwear,  
*instruct to call for   
assistance before  
getting out of bed,  
*bed/chair/bedside   
commode/toilet alarms,  
*sensory   
devices/ambulatory aides  
available and in reach,  
*medications reviewed   
for potential  
side effects and care   
planning.  
  
  
Family Violence Screen:  
Are you or have you been   
threatened or abused   
physically, emotionally,   
or  
sexually by anyoneno  
Has anyone ever   
threatened to hurt your   
family or your petsno  
Does anyone try to keep   
you from   
having/contacting other   
friends or doing  
things outside your   
homeno  
Do you feel UNSAFE going   
back to the place where   
you are livingno  
Do you feel anyone has   
exploited or taken   
advantage of you   
financially or of  
your personal propertyno  
Clinical assessment: Are   
there any apparent signs   
of injuries/behaviors   
that  
could be related to   
abuse/neglectno  
Social Service Consult   
for abuse/neglect needed   
this visitno  
  
Functional Screen:  
Functional Screen: In   
the recent/past 2-4   
weeks, patient or family   
have  
noticedno issues that   
require a   
speech/language consult   
at this time  
  
AM-PAC- Basic   
Mobility/Daily Activity:  
Patient baseline   
bedboundno  
Putting on and taking   
off regular lower body   
clothingnone  
Bathing (including   
washing, rinsing,   
drying)none  
Putting on and taking   
off regular upper body   
clothingnone  
Toileting, which   
includes using toilet,   
bedpan or urinalnone  
Taking care of personal   
grooming such as   
brushing teethnone  
Eating Mealsnone  
Daily Activity - Total   
Score24  
  
Learning Assessment   
(Patient):  
Patient is Able to be   
Assessed for Learningyes  
Factors Influencing   
Readiness to   
Learnacuteness of   
illness  
Factors that Impact   
Ability to Learnnone  
Devices/Methods Used to   
Communicatenone  
Learning   
Preferencesaudio  
Cultural   
Considerationsnone  
Developmental   
Considerationsnone  
Alevism   
Considerationsnone  
  
Learning Assessment   
(Other Learner):  
Other learner   
availableyes...  
Learnerspouse  
Factors Influencing   
Readiness to   
Learnacuteness of   
illness  
Factors that Impact   
Ability to Learnnone  
Devices/Methods Used to   
Communicatenone  
Learning   
Preferencesaudio  
Cultural   
Considerationsnone  
Developmental   
Considerationsnone  
Alevism   
Considerationsnone  
  
Depression Screen:  
During the past month,   
have you often been   
bothered by feeling   
down,  
depressed or hopelessno  
During the past month,   
have you often had   
little interest or   
pleasure in  
doing thingsno  
Have you had any   
thoughts of harming   
anyone elseno  
  
[place] Suicide:  
Risk Screen Not   
Applicable/Able to   
Answerable to be   
screened  
In the Past Month: Have   
you wished you were dead   
or could go to sleep and   
not  
wake upno  
In the Past Month: Have   
you had any actual   
thoughts of killing   
yourselfno  
Lifetime: Have you ever   
done, started to do, or   
prepared to do anything   
to  
end your lifeno  
[place] Suicide   
Risknegative  
  
Adult Nutrition Screen:  
Have you recently lost   
weight without tryingno  
Have you been eating   
poorly because of a   
decreased appetiteno  
Malnutrition Screening   
Tool Score0  
Malnutrition Screening   
Tool RiskMST = 0 or 1   
Not at risk. Eating well   
with  
little or no weight loss  
Nutrition Consult needed   
this visitno  
Can Patient Participate   
in Room Serviceyes  
Patient requires Paper   
Dishes/Plastic   
Utensilsno  
  
Pain Screen:  
Pain Scalenumerical 0-10  
Pain Scale   
Educationteaching   
provided  
Current Pain Level0 =   
None  
Acceptable Pain Level0 =   
None  
Expression of Pain   
(nonverbal)none  
Chronic Painyes  
Chronic Back pain   
locationlower, upper  
  
Spiritual Screen:  
Are there any cultural,   
spiritual (more content   
not included)...    Normal                                  [facility]  
   
                                                    Discharge [id number]   
022   
   
                                        Discharge Profile2  Discharge Orders:  
Anticipated Discharge   
Date:  
Anticipated Discharge   
[id number]  
  
Problem List:  
Prelim Disch Dx:  
Atrial fibrillation:   
Catalog Name:   
Unspecified atrial   
fibrillation  
  
DNAR:  
Code Status at   
Discharge: Full Code  
  
Activity:  
May not drive for 2   
day(s) if you were   
driving prior to your   
procedure.  
  
Diet:  
DietHeart healthy  
  
Additional Orders:  
Additional Instructions  
* You will need to   
continue blood thinner   
(Eliquis) until   
instructed otherwise.  
It is important not to   
interrupt blood thinner   
for any reason (other   
than an  
emergency) during the   
1st month after   
ablation.  
  
* You will be on   
Pantoprazole (a   
heartburn medicine) for   
4 weeks to protect the  
esophagus as it can   
become irritated with   
ablation. It is very   
important that  
you take this   
medication.  
  
* All other medications   
will generally remain   
the same unless you are   
told  
otherwise. Resume taking   
your home medications   
today (including blood   
thinner)  
as listed on the   
discharge instructions.  
  
* In the first week   
post-ablation you should   
take it easy. No heavy   
lifting or  
heavy exercise, no   
treadmill. You can use   
the stairs if needed but   
go slowly  
and minimize the number   
of times up and down.  
  
* Some minor bruising is   
common at each groin   
access site with minor   
soreness  
as if you had banged the   
area. Bruising may   
occasionally be seen to   
extend down  
the leg. This is normal   
as is an occasional   
small quarter sized bump   
in the  
area. If larger swelling   
or more significant pain   
occurs at the area,   
please  
contact the office or go   
the nearest Emergency   
Room.  
  
* You may have some   
minor chest pain for the   
next week or so. The   
pain will  
often worsen with a deep   
breath and be better   
when leaning forward.   
This is  
pericardial chest pain   
from the ablation and is   
generally not of   
concern. It  
should resolve within a   
week although it might   
increase for a day or so   
after  
the ablation.  
  
* If you develop   
unexplained fevers   
exceeding 100 degrees   
anytime within the  
first 3 weeks   
post-ablation, you need   
to contact the office.   
Low grade fevers  
of around 99 degrees are   
common in the first day   
or so post-ablation.  
  
* Atrial fibrillation   
(AFib) can recur in all   
patients who undergo   
this  
ablation for up to 4-8   
weeks post-ablation. The   
ablation itself can   
cause  
inflammation   
(pericarditis) in the   
atria and this can cause   
AFib. Some patients  
will actually experience   
an increased amount of   
atrial arrhythmia early   
after  
ablation. Approximately   
1/3 of patients will   
have this early   
recurrence of  
AFib. Medications should   
be continued and your   
heart rate controlled.   
Nothing  
else needs be done   
initially except waiting   
as in many cases these   
episodes of  
AFib will prove self   
limited.  
  
* Continue to follow up   
with your primary care   
physician, primary   
cardiologist,  
and any other   
specialists you normally   
see.  
  
  
Call Provider If   
(Homegoing Patients):  
Breathing faster than   
normal.  
  
Fever of 100.4 F (38 C)   
or higher.  
  
Chills.  
  
Acting very sleepy and   
difficult to awaken.  
  
Any new concerning   
symptoms.  
  
Passing out.  
  
Cath Lab Interventional:  
Patient Instructions,   
Next 24 hours:  
DO NOT drive a car,   
operate machinery or   
power tools. It is   
recommended that a  
responsible adult be   
with you for the first   
24 hours.  
  
DO NOT drink any   
alcoholic drinks or take   
any non-prescriptive   
medications that  
contain alcohol for the   
first 24 hours.  
  
DO NOT make any   
important decisions for   
the first 24 hours.  
  
Activity:  
You are advised to go   
directly home from the   
hospital.  
  
DO NOT lift anything   
heavier than 10 pounds   
for one week, this   
allows for  
proper healing of the   
groin.  
  
No excessive exercise or   
treadmill use for one   
week. You may walk and   
do  
stairs, slowly.  
  
No sexual activities for   
24 hours after you   
arrive home.  
  
Wound Care:  
If slight bleeding   
should occur at site,   
lie down and have   
someone apply firm  
pressure just above the   
puncture site for 5   
minutes. If it continues   
or is  
profuse, call 911.   
Always notify your   
doctor if bleeding   
occurs.  
  
Keep site clean and dry.   
Let air dry or you may   
use a simple bandaid.  
  
Gently cleanse the   
puncture site in your   
groin with soap and   
water only.  
  
You may experience some   
tenderness, bruising or   
minimal inflammation. If   
you  
have any concerns, you   
may contact the Cath Lab   
or if any of these   
symptoms  
become excessive,   
contact your   
cardiologist or go to   
the emergency room.  
  
No tub baths, soaking,   
or swimming for one   
week.  
  
May shower the next day   
after your procedure.  
  
Other Instructions:  
If you have any   
questions about the   
effects of the sedative   
drugs or groin  
care, call the physician   
who performed your   
procedure.  
  
Provider FINAL REVIEW of   
Orders:  
Final Review:  
Final Review of   
Medication   
Reconciliation and   
Orders Completedby   
Physician  
Reviewing   
ProviderJessica Johnson MD (Resident) at   
14-Dec-2022 08:02:26  
  
Appointments:  
Follow-Up Appointment   
01:  
Physician/Dept/ServiceP   
(more content not   
included)...        Normal                                  [facility]  
   
                                                    No Panel Informationon    
   
                                                365 {SECONDS}   above high   
threshold                 96 - 152                  [facility]  
[facility] [address]   
[place]  
Work Phone:   
[phone]  
   
                                        Comment on above:   Note new reference danielle padilla as of 2019. Target ACT range   
will   
vary based on the patient population, clinical status, and   
surgical intervention occurring.   
   
                                                354 {SECONDS}   above high   
threshold                 96 - 152                  [facility]  
[facility]   
[facility]   
[place]  
Work Phone:   
[phone]  
   
                                        Comment on above:   Note new reference r  
valerie as of 2019. Target ACT range   
will   
vary based on the patient population, clinical status, and   
surgical intervention occurring.   
   
                                                360 {SECONDS}   above high   
threshold                 96 - 152                  [facility]  
[facility]   
[facility]   
[place]  
Work Phone:   
[phone]  
   
                                        Comment on above:   Note new reference r  
valerie as of 2019. Target ACT range   
will   
vary based on the patient population, clinical status, and   
surgical intervention occurring.   
   
                                                361 {SECONDS}   above high   
threshold                 96 - 152                  [facility]  
[facility]   
[facility] [address]   
[place]  
Work Phone:   
[phone]  
   
                                        Comment on above:   Note new reference r  
valerie as of 2019. Target ACT range   
will   
vary based on the patient population, clinical status, and   
surgical intervention occurring.   
   
                                                    Order Reconciliationon    
   
                                        Order Reconciliation Page 1  
  
Admission Reconciliation   
Document  
  
  
Reconciliation Type:   
Admission requested on   
behalf of Jessica Johnson  
(Resident) done by   
Jessica Johnson   
(Resident))  
  
Admission -   
Reconciliation:   
13-Dec-2022 18:49 by:   
Jessica Johnson (MD  
(Resident))  
  
Home   
MedicationsEnteredLast   
Dose TakenReconciled   
with current Order  
Reconciliation Comment/   
Additional Information  
acetaminophen 500 mg   
oral tablet 2 tab(s)   
orally every 6 hours, As   
Needed  
[id number],   
2022 Reviewed and Held  
apixaban 5 mg oral   
tablet 1 tab(s) orally 2   
times a   
[id number]  
2  
AM Reviewed and Held  
carvedilol 6.25 mg oral   
tablet 1 tab(s) orally 2   
times a day (with meals)  
13-Dec-202212-Dec-2022   
PM Carvedilol Tablet   
(COREG)DOSE = 3.125 mg   
Oral 2  
Times a Daycarvedilol   
6.25 mg oral tablet   
continued as the   
inpatient order  
Carvedilol  
eplerenone 25 mg oral   
tablet 1 tab(s) oral   
once a   
day13-Dec-202212-Dec-202  
2  
Eplerenone Tablet   
(INSPRA)DOSE = 25 mg   
Oral Dailyeplerenone 25   
mg oral  
tablet continued as the   
inpatient order   
Eplerenone  
furosemide 40 mg oral   
tablet 1 tab(s) orally   
once a   
[id number]  
2  
AM Furosemide Tablet   
(LASIX)DOSE = 40 mg Oral   
Dailyfurosemide 40 mg   
oral  
tablet continued as the   
inpatient order   
Furosemide  
losartan 25 mg oral   
tablet 1 tab(s) oral   
once a   
[id number]  
2  
Reviewed and Held  
multivitamin Multiple   
Vitamins oral tablet 1   
tab(s) oral once a   
day13-Dec-2022  
12-Dec-2022 Reviewed and   
Held  
omeprazole 40 mg oral   
delayed release capsule   
1 cap(s) orally once a   
day, As  
Needed13-Dec-2022Novembe  
r,  Pantoprazole   
Enteric Coated Tablet  
(PROTONIX)DOSE = 40 mg   
Oral 2 Times a   
Dayomeprazole 40 mg oral   
delayed release  
capsule continued as the   
inpatient order   
Pantoprazole  
  
  
Additional Current   
Orders  
Ibuprofen Tablet (ADVIL,   
MOTRIN)DOSE = 400 mg   
Oral Every 8 Hours, PRN   
Pain -  
Mild (1-3)  
Sodium Chloride 0.9%   
Injectable Flush via   
Peripheral LineVolume =   
10 mL  
IntraVenous Flush Every   
8 Hours and as Needed Normal                                  [facility]  
   
                                                    Covid-19 PCR (CVDTBH)on    
   
                                                    SARS-CoV-2   
(COVID-19) RNA   
ARMANDO+probe Ql (Unsp   
spec)               Not detected        Normal              NOT   
DETECTED                                [facility]  
   
                                        Comment on above:   Result Comment: This  
 test is not yet approved or cleared by   
the   
[place] FDA. When there are no FDA-approved or cleared   
tests available, and other criteria are met, FDA can make tests   
available under an emergency access mechanism called an Emergency   
Use Authorization (EUA). The EUA for this test is supported by   
the  of Health and Human Service's (HHS's) declaration   
that circumstances exist to justify the emergency use of in vitro   
diagnostics for the detection and/or diagnosis of the virus that   
causes COVID-19. This EUA will remain in effect (meaning this   
test can be used) for the duration of the COVID-19 declaration   
justifying emergency of IVDs, unless it is terminated or revoked   
by FDA (after which the test may no longer be used).  
When diagnostic testing is negative, the possibility of a false   
negative should be considered in  
the context of a patient's recent exposures and the presence of   
clinical signs and symptoms  
consistent with SARS-CoV-2.   
   
                                                            Performed By: #### D  
HAILEY, ERUR ####  
[facility]  
[address], [place] [address]  
Dr. Sam Gonzalez   
   
                                                    BASIC METABOLIC PANELon 11   
   
                                                    Anion gap   
[Moles/Vol]     15 mmol/L       Normal          10 - 20         [facility]  
   
                                        Comment on above:   Performed By: #### B  
MP ####  
[facility]  
[id number]  
[place] [address]   
   
                      Calcium [Mass/Vol] 9.2 mg/dL  Normal     8.6 - 10.6 [facility]  
   
                                        Comment on above:   Performed By: #### B  
MP ####  
[facility]  
[id number]  
[place] [address]   
   
                      Chloride [Moles/Vol] 106 mmol/L Normal     98 - 107   [facility]  
   
                                        Comment on above:   Performed By: #### B  
MP ####  
[facility]  
[id number]  
[place] [address]   
   
                                                    Creatinine   
[Mass/Vol]      0.85 mg/dL      Normal          0.50 - 1.30     [facility]  
   
                                        Comment on above:   Performed By: #### B  
MP ####  
[facility]  
[address]  
[place] [address]   
   
                                                    GFR/1.73 sq   
M.predicted among   
non-blacks MDRD   
(S/P/Bld) [Vol   
rate/Area]      88 mL/min/{1.73_m2} Normal          >90             [facility]  
   
                                        Comment on above:   Result Comment: CALC  
ULATIONS OF ESTIMATED GFR ARE PERFORMED  
USING THE  CKD-EPI STUDY REFIT EQUATION  
WITHOUT THE RACE VARIABLE FOR THE IDMS-TRACEABLE  
CREATININE METHODS.  
https://jasn.asnjournals.org/content/early//ASN.8027795  
988   
   
                                                            Performed By: #### B  
MP ####  
[facility]  
[address]  
[place] [address]   
   
                      Glucose [Mass/Vol] 80 mg/dL   Normal     74 - 99    [facility]  
   
                                        Comment on above:   Performed By: #### B  
MP ####  
[facility]  
[address]  
[place] [address]   
   
                                                    HCO3 (Bld)   
[Moles/Vol]     24 mmol/L       Normal          21 - 32         [facility]  
   
                                        Comment on above:   Performed By: #### B  
MP ####  
[facility]  
[address]  
[place] [address]   
   
                                                    Potassium   
[Moles/Vol]     4.6 mmol/L      Normal          3.5 - 5.3       [facility]  
   
                                        Comment on above:   Performed By: #### B  
MP ####  
[facility]  
[address]  
[place] [address]   
   
                      Sodium [Moles/Vol] 140 mmol/L Normal     136 - 145  [facility]  
   
                                        Comment on above:   Performed By: #### B  
MP ####  
[facility]  
[address]  
[place] [address]   
   
                                                    Urea nitrogen   
[Mass/Vol]      24 mg/dL        High            6 - 23          [facility]  
   
                                        Comment on above:   Performed By: #### B  
MP ####  
[facility]  
[address]  
[place] [address]   
   
                                                    CBCon 2022   
   
                                                    Erythrocyte   
distribution width   
(RBC) [Ratio]   13.3 %          Normal          11.5 - 14.5     [facility]  
   
                                        Comment on above:   Performed By: #### C  
BC ####  
[facility]  
[address]  
[place] [address]   
   
                                                    Hematocrit (Bld)   
[Volume fraction] 43.9 %          Normal          41.0 - 52.0     [facility]  
   
                                        Comment on above:   Performed By: #### C  
BC ####  
[facility]  
[address]  
[place] [address]   
   
                                                    Hemoglobin (Bld)   
[Mass/Vol]      14.8 g/dL       Normal          13.5 - 17.5     [facility]  
   
                                        Comment on above:   Performed By: #### C  
BC ####  
[facility]  
[address]  
[place] [address]   
   
                                                    MCHC (RBC)   
[Mass/Vol]      33.7 g/dL       Normal          32.0 - 36.0     [facility]  
   
                                        Comment on above:   Performed By: #### C  
BC ####  
[facility]  
[address]  
[place] [address]   
   
                                                    MCV (RBC) [Entitic   
vol]            104 fL          High            80 - 100        [facility]  
   
                                        Comment on above:   Performed By: #### C  
BC ####  
[facility]  
[address]  
[place] [address]   
   
                      NUCLEATED RBC 0.0 /100 WBC Normal     0.0-0.0    [facility]  
   
                                        Comment on above:   Performed By: #### C  
BC ####  
[facility]  
[address]  
[place] [address]   
   
                                                    Platelets (Bld)   
[#/Vol]         168 10*3/uL     Normal          150 - 450       [facility]  
   
                                        Comment on above:   Performed By: #### C  
BC ####  
[facility]  
[address]  
[place] [address]   
   
                      RBC        4.24 x10E12/L Low        4.50 - 5.90 [facility]  
   
                                        Comment on above:   Performed By: #### C  
BC ####  
[facility]  
[address]  
[place] [address]   
   
                      WBC (Bld) [#/Vol] 7.1 10*3/uL Normal     4.4 - 11.3 [facility]  
   
                                        Comment on above:   Performed By: #### C  
BC ####  
[facility]  
[address]  
[place] [address]   
   
                                                    COAGULATION SCREENon   
022   
   
                                                    aPTT Coag (Bld)   
[Time]          33 s            Normal          26 - 39         [facility]  
   
                                        Comment on above:   Result Comment: THE   
APTT IS NO LONGER USED FOR MONITORING  
UNFRACTIONATED HEPARIN THERAPY.  
FOR MONITORING HEPARIN THERAPY,  
USE THE HEPARIN ASSAY.   
   
                                                            Performed By: #### C  
OAGS ####  
[facility]  
[address]  
[place] [address]   
   
                      PT Coag (PPP) [Time] 15.0 s     High       9.8 - 13.4 [facility]  
   
                                        Comment on above:   Performed By: #### C  
OAGS ####  
[facility]  
[address]  
[place] [address]   
   
                      PT, INR    1.3        High       0.9 - 1.1  [facility]  
   
                                        Comment on above:   Performed By: #### C  
OAGS ####  
[facility]  
[address]  
[place] [address]   
   
                                                    DRUG SCREEN,URINEon 20  
22   
   
                      DRUG SCREEN COMMENT Canceled   Normal                [facility]  
   
                                        Comment on above:   Order Comment: TEST   
DRUG SCREEN,URINE WAS CANCELLED,   
2022   
16:13 RBS update..   
   
                                                            Result Comment: Drug  
 screen results are presumptive and should   
not be used to assess  
compliance with prescribed medication. Contact the performing   
[facility]  
laboratory to add-on definitive confirmatory testing if   
clinically  
indicated.  
.  
Toxicology screening results are reported qualitatively. The   
concentration  
must be greater than or equal to the cutoff to be reported as   
positive. The  
concentration at which the screening test can detect an   
individual drug or  
metabolite varies. The absence of expected drug(s) and/or drug   
metabolite(s)  
may indicate non-compliance, inappropriate timing of specimen   
collection  
relative to drug administration, poor drug absorption,   
diluted/adulterated  
urine, or limitations of testing. For medical purposes only; not   
valid for  
forensic use.  
.  
Interpretive questions should be directed to the laboratory   
medical  
directors.   
   
                                                            Performed By: #### D  
RUG3 ####  
[facility]  
[address]  
[place] [address]   
   
                                                    DRUG SCREEN,URINE WITH REFLE  
X TO CONFIRMATIONon 2022   
   
                      AMPHETAMINE SCREEN,U Negative   Normal     NEGATIVE   [facility]  
   
                                        Comment on above:   Result Comment: CUTO  
FF LEVEL: 500 NG/ML  
Cross-reactivity has been reported with high concentrations  
of the following drugs: buproprion, chloroquine, chlorpromazine,  
ephedrine, mephentermine, fenfluramine, phentermine,  
phenylpropanolamine, pseudoephedrine, and propranolol.   
   
                                                            Performed By: #### D  
RUGR ####  
[facility]  
[address]  
[place] [address]   
   
                                                    BARBITURATES   
SCREEN,U        Negative        Normal          NEGATIVE        [facility]  
   
                                        Comment on above:   Result Comment: CUTO  
FF LEVEL: 200 NG/ML   
   
                                                            Performed By: #### D  
RUGR ####  
[facility]  
[address]  
[place] [address]   
   
                                                    BENZODIAZEPINES   
SCREEN,U        Negative        Normal          NEGATIVE        [facility]  
   
                                        Comment on above:   Result Comment: CUTO  
FF LEVEL: 200 NG/ML   
   
                                                            Performed By: #### D  
RUGR ####  
[facility]  
[address]  
[place] [id number]   
   
                                                    CANNABINOIDS   
SCREEN,U        Negative        Normal          NEGATIVE        [facility]  
   
                                        Comment on above:   Result Comment: CUTO  
FF LEVEL: 50 NG/ML   
   
                                                            Performed By: #### D  
RUGR ####  
[facility]  
[address]  
[place] [address]   
   
                                                    COCAINE METABOLITE   
SCREEN,U        Negative        Normal          NEGATIVE        [facility]  
   
                                        Comment on above:   Result Comment: CUTO  
FF LEVEL: 150 NG/ML   
   
                                                            Performed By: #### D  
RUGR ####  
[facility]  
[address]  
[place] [address]   
   
                      DRUG SCREEN COMMENT SEE BELOW  Normal                [facility]  
   
                                        Comment on above:   Result Comment: Drug  
 screen results are presumptive and should  
   
not be used to assess  
compliance with prescribed medication. Definitive confirmatory   
drug testing  
has been added to this sample for any positive screen result and   
will be  
reported separately.  
.  
Toxicology screening results are reported qualitatively. The   
concentration  
must be greater than or equal to the cutoff to be reported as   
positive. The  
concentration at which the screening test can detect an   
individual drug or  
metabolite varies. The absence of expected drug(s) and/or drug   
metabolite(s)  
may indicate non-compliance, inappropriate timing of specimen   
collection  
relative to drug administration, poor drug absorption,   
diluted/adulterated  
urine, or limitations of testing. For medical purposes only; not   
valid for  
forensic use.  
.  
Interpretive questions should be directed to the laboratory   
medical  
directors.   
   
                                                            Performed By: #### D  
RUGR ####  
[facility]  
[address]  
[place] [address]   
   
                                                    FENTANYL   
SCREEN,URINE    Negative        Normal          NEGATIVE        [facility]  
   
                                        Comment on above:   Result Comment: CUTO  
FF LEVEL: 5 NG/ML   
   
                                                            Performed By: #### D  
RUGR ####  
[facility]  
[address]  
[place] [address]   
   
                      METHADONE SCREEN,U Negative   Normal     NEGATIVE   [facility]  
   
                                        Comment on above:   Result Comment: CUTO  
FF LEVEL: 150 NG/ML  
The metabolite L-alpha-acetylmethadol (LAAM) is not  
detected by this method in concentrations that would  
be found in the urine of patients on LAAM therapy.   
   
                                                            Performed By: #### D  
RUGR ####  
[facility]  
[address]  
[place] [id number]   
   
                      OPIATES SCREEN,U Negative   Normal     NEGATIVE   [facility]  
   
                                        Comment on above:   Result Comment: CUTO  
FF LEVEL: 300 NG/ML  
The opiate screen does not detect fentanyl, meperidine, or  
tramadol. Oxycodone is not consistently detected (refer to  
Oxycodone Screen, Urine result).   
   
                                                            Performed By: #### D  
RUGR ####  
[facility]  
[address]  
[place] [id number]   
   
                      OXYCODONE SCREEN,U Negative   Normal     NEGATIVE   [facility]  
   
                                        Comment on above:   Result Comment: CUTO  
FF LEVEL: 100 NG/ML  
This test will accurately detect both oxycodone and oxymorphone.   
   
                                                            Performed By: #### D  
RUGR ####  
[facility]  
[address]  
[place] [id number]   
   
                      PCP SCREEN,U Negative   Normal     NEGATIVE   [facility]  
   
                                        Comment on above:   Result Comment: CUTO  
FF LEVEL: 25 NG/ML  
Cross-reactivity has been reported with dextromethorphan.   
   
                                                            Performed By: #### D  
RUGR ####  
[facility]  
[address]  
[place] [address]   
   
                                                    Laboratory - Chemistry and C  
hemistry - challengeon 2022   
   
                                                    Anion gap   
[Moles/Vol]     15 mmol/L                       10 - 20         [facility]   
[facility]  
Work Phone:   
[phone]  
   
                      Calcium [Mass/Vol] 9.2 mg/dL             8.6 - 10.6 [facility]   
Med  
Work Phone:   
[phone]  
   
                      Chloride [Moles/Vol] 106 mmol/L            98 - 107   [facility]   
Med  
Work Phone:   
[phone]  
   
                      CO2 [Moles/Vol] 24 mmol/L             21 - 32    [facility]   
Med  
Work Phone:   
[phone]  
   
                                                    Creatinine   
[Mass/Vol]      0.85 mg/dL                      See Below       [facility]   
[facility]  
Work Phone:   
[phone]  
   
                                        Comment on above:   Reference Range: 0.5  
0 - 1.30   
   
                      Glucose [Mass/Vol] 80 mg/dL              74 - 99    [facility]   
Med  
Work Phone:   
[phone]  
   
                                                    Potassium   
[Moles/Vol]     4.6 mmol/L                      3.5 - 5.3       [facility]   
Med  
Work Phone:   
[phone]  
   
                      Sodium [Moles/Vol] 140 mmol/L            136 - 145  [facility]   
Med  
Work Phone:   
[phone]  
   
                                                    Urea nitrogen   
[Mass/Vol]                24 mg/dL                  above high   
threshold                 6 - 23                    [facility]   
[facility]  
Work Phone:   
[phone]  
   
                                                    Laboratory - Coagulationon 1  
2022   
   
                                                    aPTT Coag (PPP)   
[Time]          33 s                            26 - 39         [facility]   
[facility]  
Work Phone:   
[phone]  
   
                                        Comment on above:   THE APTT IS NO LONGE  
R USED FOR MONITORING UNFRACTIONATED   
HEPARIN   
THERAPY. FOR MONITORING HEPARIN THERAPY, USE THE HEPARIN ASSAY.   
   
                                                    INR Coag (PPP)   
[Relative time]           1.3 {INR}                 above high   
threshold                 0.9 - 1.1                 [facility]   
[facility]  
Work Phone:   
[phone]  
   
                                PT Coag (PPP) [Time] 15.0 s          above high   
threshold                 9.8 - 13.4                [facility]   
[facility]  
Work Phone:   
[phone]  
   
                                                    Laboratory - Hematology and   
Cell countson 2022   
   
                                                    Erythrocyte   
distribution width   
(RBC) [Ratio]   13.3 %                          See Below       [facility]   
[facility]  
Work Phone:   
[phone]  
   
                                        Comment on above:   Reference Range: 11.  
5 - 14.5   
   
                                                    Hematocrit (Bld)   
[Volume fraction] 43.9 %                          See Below       [facility]   
[facility]  
Work Phone:   
[phone]  
   
                                        Comment on above:   Reference Range: 41.  
0 - 52.0   
   
                                                    Hemoglobin (Bld)   
[Mass/Vol]      14.8 g/dL                       See Below       [facility]   
[facility]  
Work Phone:   
[phone]  
   
                                        Comment on above:   Reference Range: 13.  
5 - 17.5   
   
                                                    MCHC (RBC)   
[Mass/Vol]      33.7 g/dL                       See Below       [facility]   
[facility]  
Work Phone:   
[phone]  
   
                                        Comment on above:   Reference Range: 32.  
0 - 36.0   
   
                                                    MCV (RBC) [Entitic   
vol]                      104 fL                    above high   
threshold                 80 - 100                  [facility]   
[facility]  
Work Phone:   
[phone]  
   
                                                    Platelets (Bld)   
[#/Vol]         168 10*3/uL                     150 - 450       [facility]   
[facility]  
Work Phone:   
[phone]  
   
                                RBC (Bld) [#/Vol] 4.24 {x10E12/L} below low   
threshold                 See Below                 [facility]   
Med  
Work Phone:   
[phone]  
   
                                        Comment on above:   Reference Range: 4.5  
0 - 5.90   
   
                      WBC (Bld) [#/Vol] 7.1 10*3/uL            4.4 - 11.3 [facility]   
[facility]   
Med  
Work Phone:   
[phone]  
   
                                                    No Panel Informationon    
   
                                 88 {mL/min/1.73m2}            >90        [facility]   
Med  
Work Phone:   
[phone]  
   
                                        Comment on above:   CALCULATIONS OF PASCUAL  
MATED GFR ARE PERFORMED USING THE    
CKD-EPI STUDY REFIT EQUATION WITHOUT THE RACE VARIABLE FOR THE   
IDMS-TRACEABLE CREATININE   
METHODS.https://jasn.asnjournals.org/content/early//ASN  
.7563200093   
   
                                 0.0 {/100_WBC}            0.0-0.0    [facility]   
[facility]  
Work Phone:   
[phone]  
   
                                 Canceled                         [facility]   
Med  
Work Phone:   
[phone]  
   
                                        Comment on above:   Drug screen results   
are presumptive and should not be used to   
assess compliance with prescribed medication. Contact the   
performing [facility] laboratory to add-on definitive confirmatory   
testing if clinically indicated. .Toxicology screening results   
are reported qualitatively. The concentration must be greater   
than or equal to the cutoff to be reported as positive. The   
concentration at which the screening test can detect an   
individual drug or metabolite varies. The absence of expected   
drug(s) and/or drug metabolite(s) may indicate non-compliance,   
inappropriate timing of specimen collection relative to drug   
administration, poor drug absorption, diluted/adulterated urine,   
or limitations of testing. For medical purposes only; not valid   
for forensic use. .Interpretive questions should be directed to   
the laboratory medical directors.   
   
                                                    STAPH/MRSA SCREENon 20   
   
                                        STAPH/MRSA SCREEN   PATIENT: ENMANUEL ARRINGTON  
   
MRN: [id number] LOCATION:   
JANET SHETTY#: [id number] :   
[birth year] AGE: SEX: M  
  
ORDER#: [id number]   
ORDERED BY: DONNA HERNANDES  
SOURCE: ANTERIOR NARES   
COLLECTED: 22   
16:12  
ANTIBIOTICS AT NELIA.:   
RECEIVED : 22   
17:44  
SITE: Nasal  
  
R E S U L T S  
  
STAPH/MRSA SCREEN FINAL   
22 09:40  
  
NO Staphylococcus aureus   
ISOLATED.           Normal                                  [facility]  
   
                                        Comment on above:   Performed By: #### S  
TAPH ####  
[facility]  
[address]  
[place] [address]   
   
                                                    Blood Pressure Cuff Sizeon 0  
2022   
   
                                                    Adult depression   
screening assessment No                                              [facility]  
Work Phone:   
[phone]  
   
                                        Fall risk assessment a) No falls within   
the   
last year                                                   [facility]  
Work Phone:   
[phone]  
   
                                                    Tobacco use status   
[facility]            a) Yes                                          [facility]  
Work Phone:   
[phone]  
   
                                                    Blood Pressure Cuff   
Size            Large                                           [facility]  
Work Phone:   
[phone]  
   
                                                    Office Visit (Cardiology)on   
2022   
   
                                        Follow-up visit     Diagnoses/Problems  
Assessed  
Atrial fibrillation   
(427.31) (I48.91)  
Nonischemic   
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   
(I42.8)  
Chief Complaint  
ENMANUEL ARRINGTON is being seen   
for a cardiovascular   
evaluation of atrial   
fibrillation.  
  
Adult Risk Screening  
There are no   
spiritual/cultural   
practices/values/needs   
that are important to   
know  
Initial Fall Risk   
Screening:  
ENMANUEL has not fallen in   
the last 6 months. His   
fall did not result in   
injury. ENMANUEL does not   
have a fear of falling.   
He does not need   
assistance with sitting,   
standing or walking.   
Does not need assistance   
walking in his home. He   
does not need assistance   
in an unfamiliar   
setting. The patient is   
not using an assistive   
device.  
Pain Scale: On a scale   
of 0 to 10, the patient   
rates the pain at 0.  
  
Living Will.  
Living Will: No living   
will on file.  
Healthcare POA: No   
healthcare proxy on   
file.  
Declaration of Mental   
Health Treatment: No   
mental health treatment   
on file.  
Tobacco Screening: ENMANUEL   
does not use tobacco.  
Domestic Violence   
Screen: Does not feel   
threatened or abused   
physically, emotionally   
or sexually. Do you feel   
UNSAFE?  
The patient feels safe   
in the home.  
Depression/Suicide   
Screening:  
He does not have a risk   
of suicide.  
He has not had thoughts   
of harming others.  
Nutrition Screening:  
In the past month, there   
was not a day when I or   
anyone in my family went   
hungry because there was   
not enough food.  
  
History of Present   
Illness  
Enmanuel Arrington is a 75 y/o   
male referred by Dr Murphy for evaluation   
on AF.  
PMH includes NICM, R   
should rotator cuff   
injury, cocaine abuse x   
17 yrs (rehab in [place] now   
sober), oxycodone abuse   
(started in ) w/   
accidental overdose and   
AF.  
Treatment of his AF   
includes carvedilol. Pt   
has not required DCCV or   
AAD?s.  
Symptoms of his AF   
include palpitations and   
DELEON.  
Pt recently followed up   
with his PCP in the end   
of April/early May and   
pt was noted to be in   
AF. Pt had a history of   
irregular heartbeat but   
no diagnosis of AF. Pt   
was started on Eliquis.  
Pt established with Dr Murphy with the plan   
for DCCV. His DCCV was   
cancelled because pt had   
COVID. Pt is not on ay   
medications for rate   
control. Pt has stopped   
all his medications   
almost 1 week ago   
including his Eliquis.   
pt has a history of   
Oxycodone abuse which he   
was started on initially   
in  for a rotator   
cuff injury. Pt has been   
obtaining the   
medications on the   
street not through   
prescription. Pt has a   
recent accidental   
overdose which required   
ED visit an reversal   
with Narcan. Pt was take   
to rehab by his daughter   
about 1 week ago but   
left the same day.  
Pt reports that he has   
been sober for 6 days.   
Pt presents today to   
discuss AF ablation.  
Echo 2022: EF 40-45%,   
septal akinesis, LA   
mildly dilated  
Stress test 2022: no   
ischemia or myocardial   
infarction  
  
Active Problems  
Problems  
Atrial fibrillation   
(427.31) (I48.91)  
Dyspnea on exertion   
(786.09) (R06.09)  
Former smoker (V15.82)   
(Z87.891)  
quit somking at age 34  
Nonischemic   
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   
(I42.8)  
Overweight with body   
mass index (BMI) of 25   
to 25.9 in adult   
(278.02,V85.21)  
(E66.3,Z68.25)  
Surgical History  
Problems  
History of Middle ear   
surgery  
History of Oral surgery  
History of Tonsillectomy  
Current Meds  
  
Medication   
NameInstruction  
Carvedilol 6.25 MG Oral   
TabletTAKE 0.5 TABLET   
Twice daily  
Eliquis 5 MG Oral   
TabletTake 1 tablet   
twice daily  
Eplerenone 25 MG Oral   
TabletTAKE 1 TABLET   
DAILY.  
Losartan Potassium 25 MG   
Oral TabletTAKE 1 TABLET   
DAILY AS DIRECTED.  
Protonix 40 MG Oral   
Tablet Delayed   
ReleaseTAKE 1 TABLET   
DAILY.  
Allergies  
Medication  
No Known Drug Allergies  
Recorded By: Almita Alford; 2022   
10:01:43 AM  
Family History  
Mother  
Family history of lung   
cancer (V16.1) (Z80.1)  
Father  
Family history of   
cerebrovascular accident   
(CVA) (V17.1) (Z82.3)  
FH: CABG (coronary   
artery bypass surgery)   
(V17.3) (Z82.49)  
Sibling  
Family history of lung   
cancer (V16.1) (Z80.1)  
Social History  
Problems  
Consumes alcohol   
occasionally (V49.89)   
(Z78.9)  
Daily caffeine   
consumption  
2 cups of coffee daily,   
pop three times weekly  
Former smoker (V15.82)   
(Z87.891)  
quit somking at age 34  
Illicit drug use   
(305.90) (F19.90)  
oxycodone  
Review of Systems  
Constitutional: not   
feeling tired and not   
feeling poorly.  
Eyes: no eyesight   
problems.  
ENT: no hearing loss and   
no nosebleeds.  
Cardiovascular: no   
intermittent leg   
claudication, no chest   
pain, no tightness or   
heavy pressure, no   
shortness of breath, no   
palpitations and as   
noted in HPI.  
Respiratory: no chronic   
cough, no shortness of   
breath, no shortness of   
breath during exertion   
and no orthopnea.  
Gastrointestinal: no   
change in bowel habits   
and no blood in stools.  
Genitourinary: no   
urinary frequency and no   
hematuria.  
Skin: no skin rashes.  
Neurological: no   
seizures, no frequent   
falls, no dizziness and   
no fainting.  
Psychiatric: no   
depression and not   
suicidal.  
Endocrine: no thyroid   
disorder and no diabetes   
mellitus.  
All other systems have   
been reviewed and are   
negative for com (more   
content not included)... Normal                                   i[employer]ged  
   
                                                    DRUG SCREEN RAPID (URINE)on   
2022   
   
                      AMP        Negative   Normal     NEGATIVE   [facility]  
   
                                        Comment on above:   Performed By: #### D  
HAILEY ERUR ####  
[facility]  
[address] [place] [address]  
Dr. Sam Gonzalez   
   
                      BAR        Negative   Normal     NEGATIVE   [facility]  
   
                                        Comment on above:   Performed By: #### D  
RUGRPD, ERUR ####  
[facility]  
[address] [place] [address]  
Dr. Sam Gonzalez   
   
                      BUP        Negative   Normal     NEGATIVE   [facility]  
   
                                        Comment on above:   Performed By: #### D  
RUGRPD, ERUR ####  
[facility]  
[address] [place] [address]  
Dr. Sam Gonzalez   
   
                      BZO        Negative   Normal     NEGATIVE   [facility]  
   
                                        Comment on above:   Performed By: #### D  
RUGRPD, ERUR ####  
[facility]  
[address] [place] [address]  
Dr. Sam Gonzalez   
   
                      OLIVIER        Negative   Normal     NEGATIVE   [facility]  
   
                                        Comment on above:   Performed By: #### D  
DOMINIKD, ERUR ####  
[facility]  
[address] [place] [address]  
Dr. Sam Gonzalez   
   
                      CUT-OFFS   SEE BELOW  Normal                [facility]  
   
                                        Comment on above:   Result Comment: AMP   
(Amphetamine): 500ng/mL, BAR   
(Barbituates):   
200 ng/mL, BZO (Benzodiazepines): 150 ng/mL, BUP (Buprenorphine):   
10 ng/mL, OLIVIER (Cocaine): 150 ng/mL, mAMP (Methamphetamine): 500   
ng/mL, MTD (Methadone): 200 ng/mL, OPI (Opiates): 100 ng/mL, OXY   
(Oxycodone): 100 ng/mL, PCP (Phencyclidine): 25 ng/mL, PPX   
(Propoxyphene): 300 ng/mL, THC (Cannabinoids): 50 ng/mL, TCA   
(Trycyclic Antidepressants): 300 ng/mL   
   
                                                            Performed By: #### D  
DOMINIKD, ERUR ####  
[facility]  
[address] [place] [address]  
Dr. Sam Gonzalez   
   
                                        DRUG CUT HEADER     DRUG CLASS TEST SYST  
EM   
CUT-OFF CONCENTRATIONS   
ARE AS FOLLOWS:     Normal                                  [facility]  
   
                                        Comment on above:   Performed By: #### D  
RUGRPD, ERUR ####  
[facility]  
[address] [place], [place] [id number]  
Dr. Sam Gonzalez   
   
                      mAMP       Negative   Normal     NEGATIVE   [facility]  
   
                                        Comment on above:   Performed By: #### D  
RUGRPD, ERUR ####  
[facility]  
[address] [address]  
Dr. Sam Gonzalez   
   
                      MTD        Negative   Normal     NEGATIVE   [facility]  
   
                                        Comment on above:   Performed By: #### D  
RUGRPD, ERUR ####  
[facility]  
[address] [address]  
Dr. Sam Gonzalez   
   
                      OPI        Negative   Normal     NEGATIVE   [facility]  
   
                                        Comment on above:   Performed By: #### D  
RUGRPD, ERUR ####  
[facility]  
[address] [address]  
Dr. Sam Gonzalez   
   
                      OXY        Positive   Abnormal   NEGATIVE   [facility]  
   
                                        Comment on above:   Performed By: #### D  
RUGRPD, ERUR ####  
[facility]  
[address] [place] [address]  
Dr. Sam Gonzalez   
   
                      PCP        Negative   Normal     NEGATIVE   [facility]  
   
                                        Comment on above:   Performed By: #### D  
RUGRPD, ERUR ####  
[facility]  
[address] [place] [address]  
Dr. Sam Gonzalez   
   
                      PPX        Negative   Normal     NEGATIVE   [facility]  
   
                                        Comment on above:   Performed By: #### D  
RUGRPD, ERUR ####  
[facility]  
[address] [place] [address]  
Dr. Sam Gonzalez   
   
                      TCA        Negative   Normal     NEGATIVE   [facility]  
   
                                        Comment on above:   Performed By: #### D  
RUGRPD, ERUR ####  
[facility]  
[address] [place] [address]  
Dr. Sam Gonzalez   
   
                      THC        Negative   Normal     NEGATIVE   [facility]  
   
                                        Comment on above:   Performed By: #### D  
RUGRPD, ERUR ####  
[facility]  
[address] [place] [address]  
Dr. Sam Gonzalez   
   
                                                    ER URINE PROFILEon   
2   
   
                      Bilirubin Ql (U) Negative   Normal     NEGATIVE   [facility]  
   
                                        Comment on above:   Performed By: #### D  
RUGRPD, ERUR ####  
[facility]  
[address] [place] [address]  
Dr. Sam Gonzalez   
   
                      Clarity (U) CLEAR      Normal     CLEAR      [facility]  
   
                                        Comment on above:   Performed By: #### D  
HAILEY, ERUR ####  
[facility]  
[address] [address]  
Dr. Sam Gonzalez   
   
                      Color (U)  YELLOW     Normal     YELLOW     [facility]  
   
                                        Comment on above:   Performed By: #### D  
HAILEY, ERUR ####  
[facility]  
[address] [address]  
Dr. Sam BUTTERFIELDAHBRE              A micrscopic examina  
tion   
will be performed if   
indicated.          Normal                                  [facility]  
   
                                        Comment on above:   Performed By: #### D  
HAILEY, ERUR ####  
[facility]  
[address] [address]  
Dr. Sam Gonzalez   
   
                      Glucose Ql (U) Negative   Normal     NEGATIVE   [facility]  
   
                                        Comment on above:   Performed By: #### D  
HAILEY, ERUR ####  
[facility]  
[address] [place] [address]  
Dr. Sam Gonzalez   
   
                      Hemoglobin Ql (U) Negative   Normal     NEGATIVE   [facility]  
   
                                        Comment on above:   Performed By: #### D  
HAILEY, ERUR ####  
[facility]  
[address] [place] [address]  
Dr. Sam Gonzalez   
   
                      Ketones Ql (U) Negative   Normal     NEGATIVE   [facility]  
   
                                        Comment on above:   Performed By: #### D  
HAILEY, ERUR ####  
[facility]  
[address] [place] [address]  
Dr. Sam Gonzalez   
   
                      LEUKOCYTES Negative   Normal     NEGATIVE   [facility]  
   
                                        Comment on above:   Performed By: #### D  
HAILEY, ERUR ####  
[facility]  
[address] [place] [address]  
Dr. Sam Gonzalez   
   
                      Nitrite Ql (U) Negative   Normal     NEGATIVE   [facility]  
   
                                        Comment on above:   Performed By: #### BRE HART, ERUR ####  
[facility]  
[address] [address]  
Dr. Sam Gonzalez   
   
                      pH (U)     5.5 [pH]   Normal     5-9        [facility]  
   
                                        Comment on above:   Performed By: #### D  
HAILEY, ERUR ####  
[facility]  
[address] [place] [address]  
Dr. Sam Gonzalez   
   
                          SPEC GRAVITY 1.025        Normal       1.005-<=1.0  
25                                      [facility]  
   
                                        Comment on above:   Performed By: #### D  
HAILEY, ERUR ####  
[facility]  
[address] [place] [address]  
Dr. Sam Gonzalez   
   
                          UA PROTEIN   Negative     Normal       NEGATIVE/   
TRACE                                   [facility]  
   
                                        Comment on above:   Performed By: #### D  
HAILEY, ERUR ####  
[facility]  
[address] [place] [address]  
Dr. Sam Gonzalez   
   
                      UR MICRO IND NOT INDICATED Normal                [facility]  
   
                                        Comment on above:   Performed By: #### D  
HAILEY, ERUR ####  
[facility]  
[address] [place] [address]  
Dr. Sam Gonzalez   
   
                      Urobilinogen Qn (U) 0.2 {Frederic'U}/dL Normal     0.2 - 1.  
0  [facility]  
   
                                        Comment on above:   Performed By: #### D  
HAILEY, ERUR ####  
[facility]  
[address] [place] [address]  
Dr. Sam Gonzalez   
   
                                                    XR CHEST 1 Von 2022   
   
                                        XR CHEST 1 V        CLINICAL HISTORY:   
Altered mental status.  
FINDINGS: Portable AP   
view of the chest   
obtained.   
Cardiomediastinal   
silhouette  
is normal. Lungs are   
clear, no evidence of   
infiltrate, suspicious   
nodule, or  
mass. No evidence of   
significant pleural   
fluid on this portable   
projection. No  
acute bony abnormality.  
IMPRESSION:  
No acute abnormality.  
Electronically   
authenticated by:   
SURENDRA MANCERA Date:   
2022 22:07    Normal                                  [facility]  
   
                                                    ACETONE SERUMon 2022   
   
                      ACETONE    Negative   Normal     NEGATIVE   [facility]  
   
                                        Comment on above:   Performed By: #### D  
HAILEY, ERUR ####  
[facility]  
[address] [place] [address]  
Dr. Sam Gonzalez   
   
                                                    AMMONIAon 2022   
   
                                                    Ammonia (P)   
[Moles/Vol]     46 umol/L       Critically high 11-32           [facility]  
   
                                        Comment on above:   Performed By: #### D  
HAILEY, ERUR ####  
[facility]  
[address] [place] [address]  
Dr. Sam Gonzalez   
   
                                                    CBC AUTO DIFFon 2022   
   
                      BASO #     0.1 103/ul Normal     0.0-0.1    [facility]  
   
                                        Comment on above:   Performed By: #### D  
HAILEY, ERUR ####  
[facility]  
[address] [place] [address]  
Dr. Sam Gonzalez   
   
                                                    Basophils/100 WBC   
(Bld)           1.0 %           Normal          0.2-2.0         [facility]  
   
                                        Comment on above:   Performed By: #### D  
HAILEY, ERUR ####  
[facility]  
[address] [place] [address]  
Dr. Sam Gonzalez   
   
                      EO #       0.6 103/ul Normal     0.0-0.7    [facility]  
   
                                        Comment on above:   Performed By: #### D  
HAILEY, ERUR ####  
[facility]  
[address] [place] [address]  
Dr. Sma Gonzalez   
   
                                                    Eosinophils/100 WBC   
(Bld)           9.4 %           Critically high 0.9-7.0         [facility]  
   
                                        Comment on above:   Performed By: #### D  
HAILEY, ERUR ####  
[facility]  
[address] [place] [address]  
Dr. Sam Gonzalez   
   
                                                    Erythrocyte   
distribution width   
(RBC) [Ratio]   13.4 %          Normal          11.0-15.0       [facility]  
   
                                        Comment on above:   Performed By: #### BRE HART, ERUR ####  
[facility]  
[address] [place] [address]  
Dr. Sam Gonzalez   
   
                                                    Hematocrit (Bld)   
[Volume fraction] 38.0 %          Critically low  42.0-54.0       [facility]  
   
                                        Comment on above:   Performed By: #### D  
HAILEY, ERUR ####  
[facility]  
[address] [place] [address]  
Dr. Sam Gonzalez   
   
                                                    Hemoglobin (Bld)   
[Mass/Vol]      12.8 g/dL       Critically low  14.0-18.0       [facility]  
   
                                        Comment on above:   Performed By: #### D  
HAILEY, ERUR ####  
[facility]  
[address] [place] [address]  
Dr. Sam Gonzalez   
   
                      IG #       0.01 10e3/ul Normal     0.00-0.03  [facility]  
   
                                        Comment on above:   Performed By: #### D  
HAILEY, ERUR ####  
[facility]  
[address] [place] [address]  
Dr. Sam Gonzalez   
   
                      IG %       0.1 %      Normal     0.0-0.5    [facility]  
   
                                        Comment on above:   Performed By: #### D  
HAILEY, ERUR ####  
[facility]  
[address] [place] [address]  
Dr. Sam Gonzalez   
   
                      LYMPH #    2.2 103/ul Normal     1.2-3.8    [facility]  
   
                                        Comment on above:   Performed By: #### D  
HAILEY, ERUR ####  
[facility]  
[address] [place] [address]  
Dr. Sam Gonzalez   
   
                                                    Lymphocytes/100 WBC   
(Bld)           32.1 %          Normal          20.5-60.0       [facility]  
   
                                        Comment on above:   Performed By: #### D  
HAILEY, ERUR ####  
[facility]  
[address] [place] [address]  
Dr. Sam Gonzalez   
   
                      MANUAL DIFF REQ NO         Normal                [facility]  
   
                                        Comment on above:   Performed By: #### D  
HAILEY ERUR ####  
[facility]  
[address] [place] [address]  
Dr. Sam Gonzalez   
   
                                                    MCH (RBC) [Entitic   
mass]           33.8 pg         Normal          25.9-34.0       [facility]  
   
                                        Comment on above:   Performed By: #### D  
HAILEY, ERUR ####  
[facility]  
[address] [place] [address]  
Dr. Sam Gonzalez   
   
                                                    MCHC (RBC)   
[Mass/Vol]      33.7 g/dL       Normal          29.9-35.2       [facility]  
   
                                        Comment on above:   Performed By: #### D  
HAILEY, ERUR ####  
[facility]  
[address] [place] [address]  
Dr. Sam Gonzalez   
   
                                                    MCV (RBC) [Entitic   
vol]            100.3 fL        Critically high 80.0-94.0       [facility]  
   
                                        Comment on above:   Performed By: #### BRE HART, ERUR ####  
[facility]  
[address] [address]  
Dr. Sam Gonzalez   
   
                      MONO #     0.8 103/ul Normal     0.3-0.8    [facility]  
   
                                        Comment on above:   Performed By: #### D  
HAILEY, ERUR ####  
[facility]  
[address] [address]  
Dr. Sam Gonzalez   
   
                                                    Monocytes/100 WBC   
(Bld)           11.9 %          Normal          1.7-12.0        [facility]  
   
                                        Comment on above:   Performed By: #### D  
HAILEY, ERUR ####  
[facility]  
[address] [address]  
Dr. Sam Gonzalez   
   
                      NEUT #     3.1 103/ul Normal     1.4-6.5    [facility]  
   
                                        Comment on above:   Performed By: #### D  
HAILEY, ERUR ####  
[facility]  
[address] [place] [address]  
Dr. Sam Gonzalez   
   
                                                    Neutrophils/100 WBC   
(Bld)           45.5 %          Normal          43.0-75.0       [facility]  
   
                                        Comment on above:   Performed By: #### D  
HAILEY, ERUR ####  
[facility]  
[address] [place] [address]  
Dr. Sam Gonzalez   
   
                                                    Platelet mean volume   
(Bld) [Entitic vol] 9.4 fL          Critically low  9.5-13.5        [facility]  
   
                                        Comment on above:   Performed By: #### D  
HAILEY, ERUR ####  
[facility]  
[address] [place] [address]  
Dr. Sam Gonzalez   
   
                      PLT        136 103/ul Critically low 150-450    [facility]  
   
                                        Comment on above:   Performed By: #### D  
HAILEY, ERUR ####  
[facility]  
[address] [place] [address]  
Dr. Sam Gonzalez   
   
                      RBC        3.79 106/ul Critically low 4.70-6.10  [facility]  
   
                                        Comment on above:   Performed By: #### D  
HAILEY, ERUR ####  
[facility]  
[address] [place] [address]  
Dr. Sam Gonzalez   
   
                      WBC        6.8 103/ul Normal     4.0-11.0   [facility]  
   
                                        Comment on above:   Performed By: #### D  
HAILEY, ERUR ####  
[facility]  
[address] [address]  
Dr. Sam Gonzalez   
   
                                                    CT HEAD WO CONon 2022   
   
                                        CT HEAD WO CON      EXAMINATION: CT HEAD  
 WO   
CON  
HISTORY: Altered mental   
status.  
TECHNIQUE: Axial CT   
scans through the head   
were obtained without IV   
contrast  
administration. Dose   
reduction techniques   
were achieved by using:   
automated  
exposure control and/or   
adjustment of mA and/or   
kV according to patient   
size  
and/or use of iterative   
reconstruction   
technique.  
COMPARISON: None.  
FINDINGS: Mild to   
moderate periventricular   
low attenuation in the   
cerebral  
hemispheres without   
associated mass effect.   
The brainstem and the   
cerebellum  
appear normal. The   
ventricular system and   
cortical sulci are   
prominent,  
secondary to cerebral   
volume loss. No area of   
abnormal mass effect,   
edema, or  
intracranial hemorrhage   
is shown. The visualized   
orbits appear normal.   
The  
visualized paranasal   
sinuses show no   
air-fluid level.   
Congenital wall down   
left  
mastoidectomy.  
IMPRESSION:  
1. 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   
microvascular ischemic   
change and age-related   
cerebral  
atrophy.  
2. No acute intracranial   
process.  
Electronically   
authenticated by: SOCRATES RICHARDS Date: 2022   
21:44               Normal                                  [facility]  
   
                                                    Covid-19 PCR (CVDTB)on 07-3  
0-2022   
   
                                                    SARS-CoV-2   
(COVID-19) RNA   
ARMANDO+probe Ql (Unsp   
spec)               Not detected        Normal              NOT   
DETECTED                                [facility]  
   
                                        Comment on above:   Result Comment: When  
 diagnostic testing is negative, the   
possibility of a false negative should be considered in  
the context of a patient's recent exposures and the presence of   
clinical signs and symptoms  
consistent with SARS-CoV-2.  
This test is not yet approved or cleared by the [place]   
FDA. When there are no FDA-approved or cleared tests available,   
and other criteria are met, FDA can make tests available under an   
emergency access mechanism called an Emergency Use Authorization   
(EUA). The EUA for this test is supported by the Grant of   
Health and Human Service's declaration that circumstances exist   
to justify the emergency use of in vitro diagnostics for the   
detection and/or diagnosis of the virus that causes COVID-19.   
This EUA will remain in effect for the duration of the COVID-19   
declaration justifying emergency of IVDs, unless it is terminated   
or revoked by the FDA (after which the test may no longer be   
used).   
   
                                                            Performed By: #### D  
HAILEY, ERUR ####  
[facility]  
[address] [place] [address]  
Dr. Sam Gonzalez   
   
                                                    ETHANOL (BLD ALC)on 20  
22   
   
                                        ALC NOTE            NOTE: 80 mg/dl is th  
e   
legal limit for a blood   
alcohol level       Normal                                  [facility]  
   
                                        Comment on above:   Performed By: #### D  
HAILEY, ERUR ####  
[facility]  
[address] [place] [address]  
Dr. Sam Gonzalez   
   
                      Ethanol [Mass/Vol] mg/dL      Normal                [facility]  
   
                                        Comment on above:   Performed By: #### D  
HAILEY, ERUR ####  
[facility]  
[address] [place] [address]  
Dr. Sma Gonzalez   
   
                                                    FREE T3on 2022   
   
                      FREE T3    3.08 pg/mlL Normal     2.18-3.98  [facility]  
   
                                        Comment on above:   Performed By: #### F  
T3 ####  
[facility]  
[address] [place] [address]  
Dr. Sam Gonzalez   
   
                                                    FREE T4on 2022   
   
                      Free T4 [Mass/Vol] 1.11 ng/dL Normal     0.76-1.46  [facility]  
   
                                        Comment on above:   Performed By: #### D  
HAILEY, ERUR ####  
[facility]  
[address] [place] [address]  
Dr. Sam Gonzalez   
   
                                                    LACTATE/LACTIC ACIDon 2022   
   
                      Lactate [Moles/Vol] 0.8 mmol/L Normal     0.4-1.9    [facility]  
   
                                        Comment on above:   Performed By: #### D  
HAILEY, ERUR ####  
[facility]  
[address] [place] [address]  
Dr. Sam Gonzalez   
   
                                                    PH VENOUS BLOODon 2022  
   
   
                      PCO2 VENOUS 48.5 mmHg  Normal     40.0-52.0  [facility]  
   
                                        Comment on above:   Performed By: #### D  
HAILEY, ERUR ####  
[facility]  
[address] [place] [address]  
Dr. Sam Gonzalez   
   
                      pH VENOUS  7.347      Normal     7.330-7.430 [facility]  
   
                                        Comment on above:   Performed By: #### D  
HAILEY, ERUR ####  
[facility]  
[address] [address]  
Dr. Sam Gonzalez   
   
                                                    PROF 14(COMP METB)on   
022   
   
                      Albumin [Mass/Vol] 3.7 g/dL   Normal     3.4-5.0    [facility]  
   
                                        Comment on above:   Performed By: #### D  
HAILEY, ERUR ####  
[facility]  
[address] [address]  
Dr. Sam Gonzalez   
   
                                                    Albumin/Globulin   
[Mass ratio]    1.2 {ratio}     Normal                          [facility]  
   
                                        Comment on above:   Performed By: #### D  
HAILEY, ERUR ####  
[facility]  
[address] [place] [address]  
Dr. Sam Gonzalez   
   
                                                    ALP [Catalytic   
activity/Vol]   78 U/L          Normal                    [facility]  
   
                                        Comment on above:   Performed By: #### BRE HART, ERUR ####  
[facility]  
[address] [place] [address]  
Dr. Sam Gonzalez   
   
                                                    ALT [Catalytic   
activity/Vol]   26 U/L          Normal          16-63           [facility]  
   
                                        Comment on above:   Performed By: #### D  
HAILEY, ERUR ####  
[facility]  
[address] [place] [address]  
Dr. Sam Gonzalez   
   
                                                    Anion gap   
[Moles/Vol]     11.1 mmol/L     Normal                          [facility]  
   
                                        Comment on above:   Performed By: #### BRE HART, ERUR ####  
[facility]  
[address] [place] [address]  
Dr. Sam Gonzalez   
   
                                                    AST [Catalytic   
activity/Vol]   23 U/L          Normal          15-37           [facility]  
   
                                        Comment on above:   Performed By: #### D  
HAILEY, ERUR ####  
[facility]  
[address] [place] [address]  
Dr. Sam Gonzalez   
   
                      Bilirubin [Mass/Vol] 1.1 mg/dL  Critically high 0.2-1.0     
 [facility]  
   
                                        Comment on above:   Performed By: #### BRE HART, ERUR ####  
[facility]  
[address] [place] [address]  
Dr. Sam Gonzalez   
   
                      Calcium [Mass/Vol] 8.6 mg/dL  Normal     8.5-10.1   [facility]  
   
                                        Comment on above:   Performed By: #### D  
HAILEY, ERUR ####  
[facility]  
[address] [address]  
Dr. Sam Gonzalez   
   
                      Chloride [Moles/Vol] 106 mmol/L Normal          The   
[facility]  
   
                                        Comment on above:   Performed By: #### D  
HAILEY, ERUR ####  
[facility]  
[address] [place] [address]  
Dr. Sam Gonzalez   
   
                      CO2 [Moles/Vol] 27.7 mmol/L Normal     21.0-32.0  [facility]  
   
                                        Comment on above:   Performed By: #### D  
HAILEY, ERUR ####  
[facility]  
[address] [place] [address]  
Dr. Sam Gonzalez   
   
                                                    Creatinine   
[Mass/Vol]      1.05 mg/dL      Normal          0.70-1.30       [facility]  
   
                                        Comment on above:   Performed By: #### BRE HART, ERUR ####  
[facility]  
[address] [place] [address]  
Dr. Sam Gonzalez   
   
                      EGFR-AF AMERICAN >60        Normal     >=60       [facility]  
   
                                        Comment on above:   Performed By: #### BRE HART, ERUR ####  
[facility]  
[address] [place] [address]  
Dr. Sam Gonzalez   
   
                      EGFR-NON AF AMERICAN >60        Normal     >=60       The   
[facility]  
   
                                        Comment on above:   Performed By: #### D  
HAILEY, ERUR ####  
[facility]  
[address] [place] [address]  
Dr. Sam Gonzalez   
   
                                                    Globulin (S)   
[Mass/Vol]      3.0 g/dL        Normal                          [facility]  
   
                                        Comment on above:   Performed By: #### D  
HAILEY, ERUR ####  
[facility]  
[address] [place] [address]  
Dr. Sam Gonzalez   
   
                      Glucose [Mass/Vol] 83 mg/dL   Normal          [facility]  
   
                                        Comment on above:   Performed By: #### D  
HAILEY, ERUR ####  
[facility]  
[address] [address]  
Dr. Sam Gonzalez   
   
                                                    Potassium   
[Moles/Vol]     4.8 mmol/L      Normal          3.5-5.1         [facility]  
   
                                        Comment on above:   Performed By: #### D  
HAILEY, ERUR ####  
[facility]  
[address] [address]  
Dr. Sam Gonzalez   
   
                      Protein [Mass/Vol] 6.7 g/dL   Normal     6.4-8.2    [facility]  
   
                                        Comment on above:   Performed By: #### D  
HAILEY, ERUR ####  
[facility]  
[address] [address]  
Dr. Sam Gonzalez   
   
                      Sodium [Moles/Vol] 140 mmol/L Normal     136-145    [facility]  
   
                                        Comment on above:   Performed By: #### D  
HAILEY, ERUR ####  
[facility]  
[address] [place] [address]  
Dr. Sam Gonzalez   
   
                                                    Urea nitrogen   
[Mass/Vol]      22.0 mg/dL      Critically high 7.0-18.0        [facility]  
   
                                        Comment on above:   Performed By: #### D  
HAILEY, ERUR ####  
[facility]  
[address] [address]  
Dr. Sam Gonzalez   
   
                                                    Urea   
nitrogen/Creatinine   
[Mass ratio]    21.0 mg/mg      Normal                          [facility]  
   
                                        Comment on above:   Performed By: #### D  
HAILEY, ERUR ####  
[facility]  
[address] [place] [address]  
Dr. Sam Gonzalez   
   
                                                    PROTIMEon 2022   
   
                                                    INR Coag (PPP)   
[Relative time] 1.15 {INR}      Normal                          [facility]  
   
                                        Comment on above:   Performed By: #### D  
HAILEY, ERUR ####  
[facility]  
[address] [place] [address]  
Dr. Sam Gonzalez   
   
                      INR GUIDELINES SEE BELOW  Normal                [facility]  
   
                                        Comment on above:   Result Comment: SUSAN  
RED INR: 2.0 - 3.0 CONDITIONS NOT LISTED   
BELOW 2.5 - 3.5 FOR PROSTHETIC HEART VALVE REPLACEMENT 2.5 - 3.5   
RECURRENT THROMBOSIS   
   
                                                            Performed By: #### D  
HAILEY, ERUR ####  
[facility]  
[address] [address]  
Dr. Sam Gonzalez   
   
                      PT Coag (PPP) [Time] 12.3 s     Critically high 9.0-11.6    
 [facility]  
   
                                        Comment on above:   Performed By: #### D  
HAILEY, ERUR ####  
[facility]  
[address] [address]  
Dr. Sam Gonzalez   
   
                                                    PTTon 2022   
   
                                                    aPTT Coag (Bld)   
[Time]          30.4 s          Normal          22.3-36.2       [facility]  
   
                                        Comment on above:   Performed By: #### D  
HAILEY, ERUR ####  
[facility]  
[address] [address]  
Dr. Sam Gonzalez   
   
                                                    TROPONIN, HIGH SENSITIVITYon  
 2022   
   
                      HSTROP     11.2 pg/mL Normal     4.0-76.1   [facility]  
   
                                        Comment on above:   Result Comment: CUT-  
OFF POINTS HAVE BEEN ESTABLISHED BASED ON   
THE   
FOURTH UNIVERSAL DEFINITIONS OF MYOCARDIAL  
INFARCTION. THE UPPER REFERENCE LIMIT (URL) OF TROPONIN, DEFINED   
AS THE 99TH PERCENTILE OF  
cTnI DISTRIBUTION IN A REFERENCE POPULATION, HAS BEEN CONFIRMED   
AS THE DECISION THRESHOLD  
FOR MI DIAGNOSIS.   
   
                                                            Performed By: #### D  
HAILEY, ERUR ####  
[facility]  
[address] [place] [address]  
Dr. Sam Gonzalez   
   
                                                    TSHon 2022   
   
                      TSH        5.294 uIU/mL Critically high 0.358-3.740 [facility]  
   
                                        Comment on above:   Performed By: #### D  
HAILEY, ERUR ####  
[facility]  
[address] [place] [address]  
Dr. Sam Gonzalez   
   
                                                    ECG 12 lead ECGon 2022  
   
   
                                        ECG 12 lead ECG     [facility] [place] [address]  
  
Electrocardiograph   
Report  
Signed  
  
Patient: Enmanuel Arrington   
MR#: [id number]  
  
: 1945   
Acct:[id number]  
  
Age/Sex: 76 / M ADM   
Date: 22  
  
Loc:  Room: Type: [facility]  
Attending Dr: LAURA Murphy DO  
  
Ordering Provider:   
George Leonard MD,   
[facility]  
Date of Service:   
  
Accession #:   
([id number]) ECG/ECG 12   
lead ECG:   
Pre-cardioversion rhythm   
assessment  
  
  
Copies to:  
  
  
Test Reason :  
Blood Pressure : ***/***   
mmHG  
Vent. Rate : 058 BPM   
Atrial Rate : 078 BPM  
P-R Int : 000 ms QRS Dur   
: 072 ms  
QT Int : 404 ms P-R-T   
Axes : 000 063 064   
degrees  
QTc Int : 396 ms  
  
Atrial fibrillation with   
slow ventricular   
response  
Low voltage QRS  
Abnormal ECG  
No previous ECGs   
available  
Confirmed by LUZ BENSON DO ([id number]) on   
2022 6:03:18 PM  
  
Referred By: George Leonard Electronically   
Signed By:LUZ BENSON DO  
  
  
  
Transcribed By: MUS  
Signed By Luz Benson DO  [id number]            Normal                                  [facility]  
   
                                                    Electrolyteson 2022   
   
                      Chloride [Moles/Vol] 101 mmol/L Normal          [facility]  
   
                                        Comment on above:   Performed By: #### L  
YTES ####  
[address]   
   
                      CO2 [Moles/Vol] 25.3 mmol/L Normal     22.0-30.0  [facility]  
   
                                        Comment on above:   Result Comment: PERF  
ORMED BY:  
[place] [address]  
[phone]  
PATHOLOGIST MEDICAL DIRECTOR  
NAKITA BLOUNT M.D.   
   
                                                            Performed By: #### L  
YTES ####  
[address]   
   
                                                    Potassium   
[Moles/Vol]     4.4 mmol/L      Normal          3.5-5.1         [facility]  
   
                                        Comment on above:   Performed By: #### L  
YTES ####  
[facility]  
[address] [place] [address]   
   
                      Sodium [Moles/Vol] 135 mmol/L Low        136-146    [facility]  
   
                                        Comment on above:   Performed By: #### L  
YTES ####  
[address]   
   
                                                    No Panel Informationon    
   
                                 25.3\S\25.3 Normal     22.0-30.0  [facility]   
250 DO  
Work Phone:   
[phone]  
   
                                        Comment on above:   PERFORMED BY:[facility] [place], [place] [id number] MEDICAL   
DIRECTORNAKITA BLOUNT M.D.   
   
                                 101\S\101  Normal          [facility]   
[place]   
250 DO  
Work Phone:   
[phone]  
   
                                 4.4\S\4.4  Normal     3.5-5.1    [facility]   
[facility]   
250 DO  
Work Phone:   
[phone]  
   
                                                135\S\135       below low   
threshold                 136-146                   [facility]   
[facility]   
250 DO  
Work Phone:   
[phone]  
   
                                                    Serum or plasma chloride mireille  
surement (moles/volume)Ordered By: George Leonard on  
   
2022   
   
                      Chloride [Moles/Vol] 101 mmol/L                 [facility]  
   
                                                    Serum or plasma potassium me  
asurement (moles/volume)Ordered By: George Leonard   
on   
2022   
   
                                                    Potassium   
[Moles/Vol]     4.4 mmol/L                      3.5-5.1         [facility]  
   
                                                    Serum or plasma sodium measu  
rement (moles/volume)Ordered By: George Leonard on   
2022   
   
                      Sodium [Moles/Vol] 135 mmol/L            136-146    [facility]  
   
                                                    Serum or plasma total carbon  
 dioxide measurement (moles/volume)Ordered By:   
George Leonard on 2022   
   
                      CO2 [Moles/Vol] 25.3 mmol/L            22.0-30.0  [facility]  
   
                                                    COVID-19 Antigenon   
2   
   
                                        COVID-19 Antigen    Healthcare Worker?:   
N  
Vanessa Reference  
------------------------  
------------------------  
------  
Vanessa Reference  
Negative  
Vanessa Blank  
------------------------  
----------------  
COVID19 Pos Results  
Positive results will   
only be called to  
COVID19 Det Results  
Providers for the   
following groups of   
patients:  
COVID19 Pos Results  
Pre-Surgical Testing,   
Emergency Room, and   
Inpatients.  
Vanessa Blank  
------------------------  
----------------  
SARS-CoV+SARS-CoV-2   
(COVID-19) Ag [Presence]   
in Respiratory specimen   
by Rapid immunoassay  
Positive for SARS   
Antigen by MILA  
[facility] [place]  
------------------------  
------------------------  
------  
Vanessa Disclaimer  
The Vanessa SARS Antigen   
MILA does not   
differentiate  
Vanessa Disclaimer  
between SARS-CoV and   
SARS-CoV-2.  
COVID19 [facility] [place]  
------------------------  
------------------------  
------  
Vanessa Disclaimer  
This test was developed   
and its performance  
Vanessa Disclaimer  
characteristic   
determined by [employer] and  
Vanessa Disclaimer  
validated at [facility].   
This  
Vanessa Disclaimer  
test has not been FDA   
cleared or approved.   
This  
Vanessa Disclaimer  
test has been authorized   
by FDA under an   
Emergency Use  
Vanessa Disclaimer  
Authorization (EUA).   
This test has been   
validated  
Vanessa Disclaimer  
in accordance with the   
FDA's Guidance Document   
(Policy  
Vanessa Disclaimer  
for Diagnostics Testing   
in Laboratories   
Certified to  
Vanessa Disclaimer  
Perform High Complexity   
Testing under CLIA prior   
to  
Vanessa Disclaimer  
Emergency Use   
Authorization for   
Coronavirus  
Vanessa Disclaimer  
iseas during the   
Public Health Emergency)  
Vanessa Disclaimer  
issued on 2020.   
This test is only   
authorized  
Vanessa Disclaimer  
for the duration of time   
the declaration that  
Vanessa Disclaimer  
circumstances exist   
justifying the   
authorization of  
Vanessa Disclaimer  
the emergency use of in   
vitro diagnostic tests   
for  
Vanessa Disclaimer  
detection of SARS-CoV-2   
virus and/or diagnosis   
of  
Vanessa Disclaimer  
COVID-19 infection under   
section 564(b)(1) of the  
Vanessa Disclaimer  
Act, 21 U.S.C.   
360bbb-3(b)(1), unless   
the  
Vanessa Disclaimer  
authorization is   
terminated or revoked   
sooner.  
PERFORMED BY:  
[facility]  
[address] [place] [address]  
[phone]  
PATHOLOGIST MEDICAL   
DIRECTOR  
NAKITA BLOUNT M.D.    Normal                                  [facility]  
   
                                        Comment on above:   Performed By: #### C  
OVID-19 VANESSA, SOFIAPOS ####  
[facility]  
[address] [place] [address] [place]   
   
                                                    COVID-19 SOFIAOrdered By: LAURA Murphy on 2022   
   
                                                    SARS-CoV+SARS-CoV-2   
(COVID-19) Ag   
IA.rapid Ql (Resp) Positive                        Negative        [facility]  
   
                                        Comment on above:   This is a duplicate   
Vanessa SARS Antigen (MILA) result to be used  
   
for statistical tracking purpose only.   
   
                                                    Laboratory - Microbiology an  
d Antimicrobial susceptibilityon 2022   
   
                                                    SARS-CoV-2   
(COVID-19) RNA   
ARMANDO+probe Ql (Unsp   
spec)                                                           [facility]   
250 DO  
Work Phone:   
[phone]  
   
                                                    No Panel InformationOrdered   
By: LAURA Murphy on 2022   
   
                      SARS Antigen (LFIA)                                  [facility]  
   
                                                    No Panel Informationon    
   
                                                Positive        Critically   
abnormal                  Negative                  [facility]   
250 DO  
Work Phone:   
[phone]  
   
                                        Comment on above:   This is a duplicate   
Vanessa SARS Antigen (IMLA) result to be used  
   
for statistical tracking purpose only.PERFORMED BY:[facility] [place], [place]   
[phone] MEDICAL DIRECTORNAKITA BLOUNT M.D.   
   
                                                    Vanessa Ag Positiveon 20  
22   
   
                                Vanessa Ag Positive Positive        Critically   
abnormal                  Negative                  [facility]  
   
                                        Comment on above:   Result Comment: This  
 is a duplicate Vanessa SARS Antigen (MILA)   
result to be  
used for statistical tracking purpose only.  
PERFORMED BY:  
[facility]  
[address] [place] [address]  
[phone]  
PATHOLOGIST MEDICAL DIRECTOR  
NAKITA BLOUNT M.D.   
   
                                                            Performed By: #### C  
OVID-19 VANESSA, SOFIAPOS ####  
[facility]  
[address] [id number] [facility]   
   
                                                    Office Visit (Cardiology)on   
2022   
   
                                        Follow-up visit     Diagnoses/Problems  
Assessed  
Family history of   
cerebrovascular accident   
(CVA) (V17.1) (Z82.3) :   
Father  
Atrial fibrillation   
(427.31) (I48.91)  
Overweight with body   
mass index (BMI) of 25   
to 25.9 in adult   
(278.02,V85.21)  
(E66.3,Z68.25)  
Former smoker (V15.82)   
(Z87.891)  
quit somking at age 34  
Dyspnea on exertion   
(786.09) (R06.00)  
Nonischemic   
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   
(I42.8)  
Orders  
Atrial fibrillation  
Cardioversion;   
Status:Active -   
Retrospective   
Authorization; Requested   
for:2022;  
IO EKG   
Electrocardiogram- 12   
Lead; Status:Complete;   
Done: 2022  
Atrial fibrillation,   
Nonischemic   
cardiomyopathy  
Start: Carvedilol 6.25   
MG Oral Tablet (Coreg);   
TAKE 1 TABLET TWICE   
DAILY WITH  
MEALS  
Start: Losartan   
Potassium 25 MG Oral   
Tablet; TAKE 1 TABLET   
DAILY AS DIRECTED  
Nonischemic   
cardiomyopathy  
Start: Eplerenone 25 MG   
Oral Tablet; TAKE 1   
TABLET DAILY  
Overweight with body   
mass index (BMI) of 25   
to 25.9 in adult  
Healthy Weight Tips;   
Status:Complete -   
Retrospective   
Authorization; Done:   
2022  
Some eating tips that   
can help you lose   
weight.; Status:Complete   
- Retrospective  
Authorization; Done:   
2022  
SocHx: Former smoker  
Tobacco Use Screening;   
Status:Complete; Done:   
2022  
Patient Instructions  
By signing my name   
below, I, Elham Avelar LPN. ,Scribe,   
attest that this   
documentation has been   
prepared under the   
direction and in the   
presence of Dr. Agustín Murphy DO.  
Please bring all   
medicines, vitamins, and   
herbal supplements with   
you when you come to the   
office.  
Prescriptions will not   
be filled unless you are   
compliant with your   
follow up appointments   
or have a follow up   
appointment scheduled as   
per instruction of your   
physician. Refills   
should be requested at   
the time of your visit.  
FALL PREVENTION   
EDUCATION GIVEN  
Follow up after testing   
completed  
  
Chief Complaint  
ENMANUEL ARRINGTON is being seen   
for a consultation for   
HOY ABN ECHO.  
76-year-old gentleman   
seen in cardiology   
consultation at the   
request of Dr. Jansen for   
abnormal echocardiogram   
and stress imaging. He   
complains of exertional   
dyspnea he states that   
occurred around or just   
prior to his first   
booster shot for COVID.   
He also admits that he   
probably had COVID   
illness but was never   
tested earlier in the   
year.  
His chief complaints   
right now are occasional   
lower extremity edema   
right greater than left   
that is improved with   
transient use of   
diuretics administered   
by Dr. Jansen. Exertional   
dyspnea with any type of   
walking or walking up   
stairs or work related   
activities. He denies   
angina, true heart   
failure   
hospitalizations,   
syncope. He  
He does admit to history   
of irregular heartbeat   
dating back to  when   
he was in [place]   
details of which are   
unknown. 4 weeks ago   
when in Dr. Jansen's office   
for the first time in   
many years, ECG was   
performed revealing   
atrial fibrillation with   
controlled ventricular   
response. Subsequent   
stress perfusion imaging   
and echocardiography   
were performed at   
[facility]   
revealing normal   
perfusion scan, but   
atrial fibrillation so   
no ejection fraction   
estimate was performed   
on perfusion scan.   
Echocardiogram revealed   
reduced left ventricular   
function with ejection   
fraction estimated to be   
40 to 45%.  
He was then placed on   
Eliquis for his A. fib,   
and it appears that he   
has been on Eliquis for   
2 to 3 weeks. Patient   
and his wife will   
corroborate when his   
start date for Eliquis   
was and return that   
information.  
My estimation is that he   
has left ventricular   
systolic heart failure   
likely secondary to   
chronic atrial   
fibrillation (duration   
of which is unknown). It   
is worth a shot at least   
to try cardioversion at   
least once in order to   
restore rhythm.  
Recommendations,   
informed decision-making   
process, risk benefits   
alternatives of   
discussed with patient.   
We will proceed with   
guideline directed   
medical therapies   
including institution of   
carvedilol, losartan,   
eplerenone, and elective   
cardioversion in the end   
of  at least 6 weeks   
after its initiation of   
Eliquis, and follow-up   
thereafterwards. We may   
need to institute loop   
diuretics if necessary.   
Otherwise if atrial   
fibrillation continues   
to be problematic I will   
have to refer him either   
to general cardiology or   
electrophysiology in the   
future otherwise I will   
be happy to take care of   
the left ventricular   
function issues at this   
moment.  
  
Active Problems Problems  
Former smoker (V15.82)   
(Z87.891)  
quit somking at age 34  
Surgical History  
Problems  
History of Middle ear   
surgery  
History of Oral surgery  
History of Tonsillectomy  
Current Meds  
  
Medication   
NameInstruction  
Eliquis 5 MG Oral   
TabletTake 1 tablet   
twice daily  
Protonix 40 MG Oral   
Tablet Delayed   
ReleaseTAKE 1 TABLET   
DAILY.  
Allergies  
Medication  
No Known Drug Allergies  
Recorded By: Almita Alford; 2022   
10:01:43 AM  
Social History  
Problems  
Consumes alcohol   
occasionally (V49.89)   
(Z78.9)  
Daily caffeine   
consumption  
2 cups of coffee daily,   
pop three times weekly  
Former smoker (V15.82)   
(Z87.891)  
quit somking at age 34  
Illicit drug use (305   
(more content not   
included)...        Normal                                  [facility]  
   
                                                    PHQ-2 VITALSon 2022   
   
                                        Fall risk assessment b) One or more fall  
s in   
the last year                                               [facility]   
[employer]   
250 DO  
Work Phone:   
[phone]  
   
                                                    Tobacco use status   
[facility]            b) No                                           [facility]   
[employer]   
250 DO  
Work Phone:   
[phone]  
   
                      PHQ-2 VITALS Yes                              [facility]  
o   
[employer]   
250 DO  
Work Phone:   
[phone]  
   
                                                    NM david perf SPECT rest stron  
 2022   
   
                                                    NM david perf SPECT   
rest str                                [facility] [place] [address]  
  
Nuclear Medicine Report  
Signed  
  
Patient: Enmanuel Arrington   
MR#: [id number]  
  
: 1945   
Acct:[id number]  
  
Age/Sex: 76 / M ADM   
Date: 22  
  
Loc: [place] Room: Type: [facility]  
Attending Dr: Leatha Jansen MD  
  
Ordering Provider:   
Leatha Jansen MD  
Date of Service:   
22  
Accession #:   
([id number] NM/NM david   
perf SPECT rest str:   
DYSPNEA, PALPITATIONS,   
CHEST PAIN/ A-FIB  
  
  
Copies to: MD Jackelin Borja MD  
  
  
REFERRING PHYSICIAN:   
Leatha Jansen MD  
  
REASON FOR STUDY: Chest   
pain, shortness of   
breath.  
  
PROCEDURE: The patient   
underwent one-day   
rest/stress protocol.   
Rest images obtained by   
injecting  
6.1 mCi of Cardiolite.   
Stress images obtained   
by injecting 18.6 mCi of   
Cardiolite.   
Subsequently,  
perfusion studies were   
performed. Gated SPECT   
was not done due to the   
presence of atrial  
fibrillation.  
  
IMAGING RESULT: This   
appears to be fair   
study. It appears to be   
fairly normal. There is   
no clear  
pattern of ischemia or   
myocardial infarction.  
  
CONCLUSION:  
  
1. No ischemia or   
myocardial infarction.  
2. Left ejection   
fraction study could not   
be performed due to the   
presence of atrial  
fibrillation.  
  
  
  
Transcribed By: CHIKA   
22 [id number]  
Dictated By: Jackelin De La Garza MD 22   
1113  
  
Signed By:  
22 1132       [facility]  
   
                                                    STR cardiac stress/lexiscano  
n 2022   
   
                                                    STR cardiac   
stress/lexiscan                         [facility] [place] [id number]  
  
Cardiac Stress Test  
Signed  
  
Patient: Enmanuel Arrington   
MR#: [id number]  
  
: 1945   
Acct:[id number]  
  
Age/Sex: 76 / M ADM   
Date: 22  
  
Loc: [place] Room: Type: [facility]  
Attending Dr: Leatha Jansen MD  
Copies to: MD Jackelin Borja MD  
  
  
  
Ordering Provider:   
Leatha Jansen MD  
Date of Service:   
22  
Accession #:   
([id number]) STR/STR   
cardiac stress/lexiscan:   
afib ,dyspnea   
palpitations cp  
  
  
  
  
REFERRING PHYSICIAN:   
Leatha Jansen MD  
  
REASON FOR STUDY:   
Shortness of breath and   
palpitation.  
  
PROCEDURE: The patient   
underwent a Lexiscan   
myocardial perfusion   
study. The patient was   
injected  
with 0.4 mg of Lexiscan,   
following which no   
symptoms reported. Blood   
pressure and heart   
response to  
Lexiscan was   
physiologic. Baseline   
ECG showed normal sinus   
rhythm, no ST-T changes.   
Following  
Lexiscan, no changes   
were seen.  
  
CONCLUSION:  
1. Lexiscan Cardiolite   
stress test without   
diagnostic ST-T changes   
for ischemia.  
2. No provoked chest   
pain or arrhythmia.  
3. Appropriate   
hemodynamic response to   
Lexiscan.  
4. Perfusion images will   
be dictated separately.  
  
  
  
Transcribed By: CHIKA   
22 0954  
Dictated By: Jackelin De La Garza MD 22   
1056  
  
Signed By:  
22       [facility]  
   
                                                    ECH echo transthoracicon    
   
                                                    [facility] echo   
transthoracic                           [facility] [address] [address]  
  
Echocardiogram  
Signed  
  
Patient: Enmanuel Arrington   
MR#: [id number]  
  
: 1945   
Acct:[id number]  
  
Age/Sex: 76 / M ADM   
Date: 22  
  
Loc: [place] Room: Type: [facility]  
Attending Dr: Leatha Jansen MD  
  
Ordering Provider:   
Leatha Jansen MD  
Date of Service:   
22/  
Accession #:   
([id number]) ECH/ECH   
echo transthoracic:   
DYSPNEA, PALPITATONS,   
CHEST PAIN, A-FIB.  
  
  
Copies to: MD George Borja MD,   
FACC  
  
  
PM  
MRN: [id number] Patient   
Location: [place]  
  
: 1945 Gender:   
Male  
(MM/DD/YYYY)  
  
Age: 76 Years Ordering   
Physician: Leatha Jansen  
  
Height: 67 in Referring   
Physician: LAURA Murphy  
  
Weight: 165.003 lb   
Performed By: NOHEMI Shirley  
  
BSA: 1.86 m2  
  
BP: 138 / 96 mmHg HR: 65   
bpm  
  
Reason For Study:   
DYSPNEA,  
  
PALPITATONS, CHEST PAIN,   
A-  
FIB.  
  
History: former smoker  
  
+-----------------------  
------------------------  
------------------------  
----+  
Interpretation Summary  
  
The left ventricular   
size and thickness are   
normal.  
There are regional wall   
motion abnormalities as   
specified.  
  
There is septal   
akinesis.  
Septal motion is   
consistent with   
conduction abnormality.  
Ejection Fraction =   
40-45%.  
The left atrium appears   
mildly dilated.  
  
The right atrium is   
mildly dilated.  
  
Procedure/Quality: A   
two-dimensional   
transthoracic  
echocardiogram with   
color flow and Doppler   
was  
performed.  
  
Left Ventricle: The left   
ventricular size and   
thickness are  
normal. Ejection   
Fraction = 40-45%. There   
are regional wall  
motion abnormalities as   
specified. There is   
septal akinesis.  
  
Septal motion is   
consistent with   
conduction abnormality.  
  
Left Atrium: The left   
atrium appears mildly   
dilated. The  
atrial septum appears   
normal.  
  
Right Atrium: The right   
atrium is mildly   
dilated.  
  
Right Ventricle: The   
right ventricular size,   
thickness and  
  
function are normal.  
  
Aortic Valve: The aortic   
valve is normal in   
structure and  
function.  
  
Mitral Valve: The mitral   
valve is normal.  
  
Tricuspid Valve: The   
tricuspid valve is   
normal.  
  
Pulmonic Valve: The   
pulmonic valve is not   
well visualized.  
  
Arteries: The aortic   
root is normal size.  
  
Pericardium/Pleura: No   
pericardial effusion   
seen. There is  
no pleural effusion.  
  
IVC/Hepatic Veins: The   
inferior vena cava was   
not  
  
visualized during the   
exam.  
  
Miscellaneous: No   
thrombus, vegetation or   
mass is seen.  
  
MMode/2D Measurements   
Calculations  
  
IVSd (0.7-1.1 cm): 0.85   
cm LVIDd (3.7-5.4 cm):   
4.3 cm  
  
LVPWd (0.7-1.1 cm): 1.07   
cm LVIDs (2.3-3.6 cm):   
3.0 cm  
  
LA dimension (2.3-4.0   
cm): 4.5 cm Ao root diam   
(2.0-3.2 cm): 3.1 cm  
  
FS: 29.7 % Ao root area:   
7.6 cm2  
  
EDV(Teich): 83.1 ml  
  
ESV(Teich): 35.7 ml  
  
EF(Teich): 57.0 %  
  
Doppler Measurements   
Calculations  
  
MV E max yaw: 74.1   
cm/sec  
  
MV A max yaw: 29.1   
cm/sec  
  
MV dec time: 0.38 sec  
  
MV dec slope: 196.7   
cm/sec??  
  
E/E' lat: 5.4  
  
E/E' med: 6.4  
  
TV max P.0 mmHg  
  
TR max yaw: 221.3 cm/sec  
  
TR max P.6 mmHg  
  
RAP systole: 10.0 mmHg  
  
+-----------------------  
------------------------  
------------------------  
-+  
+-----------------------  
------------------------  
------------------------  
-+  
+-----------------------  
---+--------------------  
------------------------  
-+  
: Electronically signed   
:  
: by: GEORGE MORRISON. :  
: MD JASMYNE, :  
: :  
: [facility] :  
: on: 2022, 1:49 :  
: :  
: PM :  
  
  
  
  
  
Transcribed By: SCV  
Performed At: 22   
1222  
Signed By: George Leonard MD, [facility]   
22 1349       [facility]  
   
                                                    BNPon 2022   
   
                                                    Natriuretic peptide   
B (Bld) [Mass/Vol] 1031.0 pg/mL    Normal          <=1,800.0       [facility]  
   
                                        Comment on above:   Performed By: #### T  
SH, CMP, LIPID, CMADM, BNP, T7 ####  
[facility]  
[address] [place] [address]  
Dr. Sam Gonzalez   
   
                                                    CARDIAC FRANCIA ADMITon   
022   
   
                                                    CK [Catalytic   
activity/Vol]   55 U/L          Normal                    [facility]  
   
                                        Comment on above:   Performed By: #### T  
SH, CMP, LIPID, CMADM, BNP, T7 ####  
[facility]  
[address] [address]  
Dr. Sam Gonzalez   
   
                      CK.MB [Mass/Vol] 1.57 ng/mL Normal     <=2.37     [facility]  
   
                                        Comment on above:   Performed By: #### T  
SH, CMP, LIPID, CMADM, BNP, T7 ####  
[facility]  
[address] [address]  
Dr. Sam Gonzalez   
   
                      HSTROP     13.8 pg/mL Normal     4.0-42.2   [facility]  
   
                                        Comment on above:   Result Comment: CUT-  
OFF POINTS HAVE BEEN ESTABLISHED BASED ON   
THE   
FOURTH UNIVERSAL DEFINITIONS OF MYOCARDIAL  
INFARCTION. THE UPPER REFERENCE LIMIT (URL) OF TROPONIN, DEFINED   
AS THE 99TH PERCENTILE OF  
cTnI DISTRIBUTION IN A REFERENCE POPULATION, HAS BEEN CONFIRMED   
AS THE DECISION THRESHOLD  
FOR MI DIAGNOSIS.   
   
                                                            Performed By: #### T  
SH, CMP, LIPID, CMADM, BNP, T7 ####  
[facility]  
[address] [address]  
Dr. Sam Gonzalez   
   
                      DAVID        56.0 ng/mL Normal     <=121.0    [facility]  
   
                                        Comment on above:   Performed By: #### T  
SH, CMP, LIPID, CMADM, BNP, T7 ####  
[facility]  
[address] [place] [address]  
Dr. Sam Gonzalez   
   
                                                    CBC AUTO DIFFon 2022   
   
                      BASO #     0.1 103/ul Normal     0.0-0.1    [facility]  
   
                                        Comment on above:   Performed By: #### D  
HAILEY, ERUR ####  
[facility]  
[address] [place] [address]  
Dr. Sam Gonzalez   
   
                                                    Basophils/100 WBC   
(Bld)           1.1 %           Normal          0.2-2.0         [facility]  
   
                                        Comment on above:   Performed By: #### D  
HAILEY, ERUR ####  
[facility]  
[address] [place] [address]  
Dr. Sam Gonzalez   
   
                      EO #       0.8 103/ul Critically high 0.0-0.7    [facility]  
   
                                        Comment on above:   Performed By: #### D  
HAILEY, ERUR ####  
[facility]  
[address] [place] [address]  
Dr. Sam Gonzalez   
   
                                                    Eosinophils/100 WBC   
(Bld)           14.7 %          Critically high 0.9-7.0         [facility]  
   
                                        Comment on above:   Performed By: #### D  
HAILEY, ERUR ####  
[facility]  
[address] [place] [address]  
Dr. Sam Gonzalez   
   
                                                    Erythrocyte   
distribution width   
(RBC) [Ratio]   14.0 %          Normal          11.0-15.0       [facility]  
   
                                        Comment on above:   Performed By: #### BRE HART, ERUR ####  
[facility]  
[address] [place] [address]  
Dr. Sam Gonzalez   
   
                                                    Hematocrit (Bld)   
[Volume fraction] 43.1 %          Normal          42.0-54.0       [facility]  
   
                                        Comment on above:   Performed By: #### BRE HART, ERUR ####  
[facility]  
[address] [place] [address]  
Dr. Sam Gonzalez   
   
                                                    Hemoglobin (Bld)   
[Mass/Vol]      14.4 g/dL       Normal          14.0-18.0       [facility]  
   
                                        Comment on above:   Performed By: #### D  
HAILEY, ERUR ####  
[facility]  
[address] [address]  
Dr. Sam Gonzalez   
   
                      IG #       0.01 10e3/ul Normal     0.00-0.03  [facility]  
   
                                        Comment on above:   Performed By: #### D  
DOMINIKD, ERUR ####  
[facility]  
[address] [address]  
Dr. Sam Gonzalez   
   
                      IG %       0.2 %      Normal     0.0-0.5    [facility]  
   
                                        Comment on above:   Performed By: #### D  
HAILEY, ERUR ####  
[facility]  
[address] [address]  
Dr. Sam Gonzalez   
   
                      LYMPH #    1.7 103/ul Normal     1.2-3.8    [facility]  
   
                                        Comment on above:   Performed By: #### D  
HAILEY, ERUR ####  
[facility]  
[address] [address]  
Dr. Sam Gonzalez   
   
                                                    Lymphocytes/100 WBC   
(Bld)           32.7 %          Normal          20.5-60.0       [facility]  
   
                                        Comment on above:   Performed By: #### D  
HAILEY, ERUR ####  
[facility]  
[address] [place] [address]  
Dr. Sam Gonzalez   
   
                      MANUAL DIFF REQ NO         Normal                [facility]  
   
                                        Comment on above:   Performed By: #### D  
HAILEY, ERUR ####  
[facility]  
[address] [address]  
Dr. Sam Gonzalez   
   
                                                    MCH (RBC) [Entitic   
mass]           34.6 pg         Critically high 25.9-34.0       [facility]  
   
                                        Comment on above:   Performed By: #### D  
HAILEY, ERUR ####  
[facility]  
[address] [address]  
Dr. Sam Gonzalez   
   
                                                    MCHC (RBC)   
[Mass/Vol]      33.4 g/dL       Normal          29.9-35.2       [facility]  
   
                                        Comment on above:   Performed By: #### D  
HAILEY, ERUR ####  
[facility]  
[address] [address]  
Dr. Sam Gonzalez   
   
                                                    MCV (RBC) [Entitic   
vol]            103.6 fL        Critically high 80.0-94.0       [facility]  
   
                                        Comment on above:   Performed By: #### D  
HAILEY, ERUR ####  
[facility]  
[address] [place] [address]  
Dr. Sam Gonzalez   
   
                      MONO #     0.6 103/ul Normal     0.3-0.8    [facility]  
   
                                        Comment on above:   Performed By: #### D  
HAILEY, ERUR ####  
[facility]  
[address] [place] [address]  
Dr. Sam Gonzalez   
   
                                                    Monocytes/100 WBC   
(Bld)           10.4 %          Normal          1.7-12.0        [facility]  
   
                                        Comment on above:   Performed By: #### BRE HART, ERUR ####  
[facility]  
[address] [place] [address]  
Dr. Sam Gonzalez   
   
                      NEUT #     2.2 103/ul Normal     1.4-6.5    [facility]  
   
                                        Comment on above:   Performed By: #### BRE HART, ERUR ####  
[facility]  
[address] [place] [address]  
Dr. Sam Gonzalez   
   
                                                    Neutrophils/100 WBC   
(Bld)           40.9 %          Critically low  43.0-75.0       [facility]  
   
                                        Comment on above:   Performed By: #### BRE HART, ERUR ####  
[facility]  
[address] [place] [address]  
Dr. Sam Gonzalez   
   
                                                    Platelet mean volume   
(Bld) [Entitic vol] 9.6 fL          Normal          9.5-13.5        [facility]  
   
                                        Comment on above:   Performed By: #### BRE HART, ERUR ####  
[facility]  
[address] [place] [address]  
Dr. Sam Gonzalez   
   
                      PLT        154 103/ul Normal     150-450    [facility]  
   
                                        Comment on above:   Performed By: #### BRE HART, ERUR ####  
[facility]  
[address] [place] [address]  
Dr. Sam Gonzalez   
   
                      RBC        4.16 106/ul Critically low 4.70-6.10  [facility]  
   
                                        Comment on above:   Performed By: #### BRE HART, ERUR ####  
[facility]  
[address] [address]  
Dr. Sam Gonzalez   
   
                      WBC        5.3 103/ul Normal     4.0-11.0   [facility]  
   
                                        Comment on above:   Performed By: #### D  
GREER HARTR ####  
[facility]  
[address] [address]  
Dr. Sam Gonzalez   
   
                                                    FREE THYROXINE INDEX T7on    
   
                      FTI        2.59       Normal                [facility]  
   
                                        Comment on above:   Performed By: #### T  
SH, CMP, LIPID, CMADM, BNP, T7 ####  
[facility]  
[address] [address]  
Dr. Sam Gonzalez   
   
                      T3U        35.0 %     Normal     23.5-40.5  [facility]  
   
                                        Comment on above:   Performed By: #### T  
SH, CMP, LIPID, CMADM, BNP, T7 ####  
[facility]  
[address] [address]  
Dr. Sam Gonzalez   
   
                      T4 [Mass/Vol] 7.40 ug/dL Normal     5.53-11.00 [facility]  
   
                                        Comment on above:   Performed By: #### T  
SH, CMP, LIPID, CMADM, BNP, T7 ####  
[facility]  
[address] [address]  
Dr. Sam Gonzalez   
   
                                                    GLYCOHEMOGLOBIN A1Con 2022   
   
                                        ADA RECOMMENDATION  ADA THERAPEUTIC TARG  
ET   
6.0 - 7.0 ACTION   
SUGGESTED > 7.0     Normal                                  [facility]  
   
                                        Comment on above:   Performed By: #### A  
1C ####  
[facility]  
[address] [address]  
Dr. Sam Gonzalez   
   
                      Glucose [Mass/Vol] 111 mg/dL  Normal                [facility]  
   
                                        Comment on above:   Performed By: #### A  
1C ####  
[facility]  
[address] [place] [address]  
Dr. Sam Gonzalez   
   
                                                    HbA1c (Bld) [Mass   
fraction]       5.5 %           Normal          <=6.0           [facility]  
   
                                        Comment on above:   Performed By: #### A  
1C ####  
[facility]  
[address] [place] [address]  
Dr. Sam Gonzalez   
   
                                                    LIPID PROFILEon 2022   
   
                      CHOL-HDL RATIO NORM SEE BELOW  Normal                [facility]  
   
                                        Comment on above:   Result Comment: 3.3   
- 4.4 LOW RISK 4.4 - 7.1 AVERAGE RISK 7.1   
-   
11.0 MODERATE RISK >11.0 HIGH RISK   
   
                                                            Performed By: #### T  
SH, CMP, LIPID, CMADM, BNP, T7 ####  
[facility]  
[address] [address]  
Dr. Sam Gonzalez   
   
                                                    Cholesterol   
[Mass/Vol]      166 mg/dL       Normal          <=200           [facility]  
   
                                        Comment on above:   Performed By: #### T  
SH, CMP, LIPID, CMADM, BNP, T7 ####  
[facility]  
[address] [address]  
Dr. Sam Gonzalez   
   
                                                    Cholesterol in HDL   
[Mass/Vol]      68 mg/dL        Critically high 40-60           [facility]  
   
                                        Comment on above:   Performed By: #### T  
SH, CMP, LIPID, CMADM, BNP, T7 ####  
[facility]  
[address] [address]  
Dr. Sam Gonzalez   
   
                                                    Cholesterol in LDL   
[Mass/Vol]      89.4 mg/dL      Normal                          [facility]  
   
                                        Comment on above:   Performed By: #### T  
SH, CMP, LIPID, CMADM, BNP, T7 ####  
[facility]  
[address] [address]  
Dr. Sam Gonzalez   
   
                                                    Cholesterol.total/Ch  
olesterol in HDL   
[Mass ratio]    2.4 {ratio}     Normal                          [facility]  
   
                                        Comment on above:   Performed By: #### T  
SH, CMP, LIPID, CMADM, BNP, T7 ####  
[facility]  
[address] [address]  
Dr. Sam Gonzalez   
   
                                        HDL NORMAL          > or = 60 mg/dl - LO  
W   
CARDIOVASCULAR RISK <40   
mg/dl - HIGH   
CARDIOVASCULAR RISK Normal                                  [facility]  
   
                                        Comment on above:   Performed By: #### T  
SH, CMP, LIPID, CMADM, BNP, T7 ####  
[facility]  
[address] [address]  
Dr. Sam Gonzalez   
   
                      LDL CALC NORMAL SEE BELOW  Normal                [facility]  
   
                                        Comment on above:   Result Comment: <100  
 mg/dl OPTIMAL 100 - 129 mg/dl NEAR OR   
ABOVE   
OPTIMAL 130 - 159 mg/dl BORDERLINE HIGH 160 - 189 mg/dl HIGH >190   
mg/dl VERY HIGH   
   
                                                            Performed By: #### T  
SH, CMP, LIPID, CMADM, BNP, T7 ####  
[facility]  
[address] [address]  
Dr. Sam Gonzalez   
   
                                                    Triglyceride   
[Mass/Vol]      43 mg/dL        Normal          <=150           [facility]  
   
                                        Comment on above:   Performed By: #### T  
SH, CMP, LIPID, CMADM, BNP, T7 ####  
[facility]  
[address] [address]  
Dr. Sam Gonzalez   
   
                      VLDL CALC  8.6 mg/dL  Normal                [facility]  
   
                                        Comment on above:   Performed By: #### T  
SH, CMP, LIPID, CMADM, BNP, T7 ####  
[facility]  
[address] [address]  
Dr. Sam Gonzalez   
   
                                                    PROF 14(COMP METB)on   
022   
   
                      Albumin [Mass/Vol] 3.7 g/dL   Normal     3.4-5.0    [facility]  
   
                                        Comment on above:   Performed By: #### T  
SH, CMP, LIPID, CMADM, BNP, T7 ####  
[facility]  
[address] [address]  
Dr. Sam Gonzalez   
   
                                                    Albumin/Globulin   
[Mass ratio]    1.2 {ratio}     Normal                          [facility]  
   
                                        Comment on above:   Performed By: #### T  
SH, CMP, LIPID, CMADM, BNP, T7 ####  
[facility]  
[address] [address]  
Dr. Sam Gonzalez   
   
                                                    ALP [Catalytic   
activity/Vol]   81 U/L          Normal                    [facility]  
   
                                        Comment on above:   Performed By: #### T  
SH, CMP, LIPID, CMADM, BNP, T7 ####  
[facility]  
[address] [address]  
Dr. Sam Gonzalez   
   
                                                    ALT [Catalytic   
activity/Vol]   26 U/L          Normal          16-63           [facility]  
   
                                        Comment on above:   Performed By: #### T  
SH, CMP, LIPID, CMADM, BNP, T7 ####  
[facility]  
[address] [address]  
Dr. Sam Gonzalez   
   
                                                    Anion gap   
[Moles/Vol]     11.8 mmol/L     Normal                          [facility]  
   
                                        Comment on above:   Performed By: #### T  
SH, CMP, LIPID, CMADM, BNP, T7 ####  
[facility]  
[address] [place] [address]  
Dr. Sam Gonzalez   
   
                                                    AST [Catalytic   
activity/Vol]   19 U/L          Normal          15-37           [facility]  
   
                                        Comment on above:   Performed By: #### T  
SH, CMP, LIPID, CMADM, BNP, T7 ####  
[facility]  
[address] [address]  
Dr. Sam Gonzalez   
   
                      Bilirubin [Mass/Vol] 1.6 mg/dL  Critically high 0.2-1.3     
 [facility]  
   
                                        Comment on above:   Performed By: #### T  
SH, CMP, LIPID, CMADM, BNP, T7 ####  
[facility]  
[address] [place] [address]  
Dr. Sam Gonzalez   
   
                      Calcium [Mass/Vol] 8.6 mg/dL  Normal     8.5-10.1   [facility]  
   
                                        Comment on above:   Performed By: #### T  
SH, CMP, LIPID, CMADM, BNP, T7 ####  
[facility]  
[address] [place] [address]  
Dr. Sam Gonzalez   
   
                      Chloride [Moles/Vol] 105 mmol/L Normal          The   
[facility]  
   
                                        Comment on above:   Performed By: #### T  
SH, CMP, LIPID, CMADM, BNP, T7 ####  
[facility]  
[address] [place] [address]  
Dr. Sam Gonzalez   
   
                      CO2 [Moles/Vol] 27.9 mmol/L Normal     22.0-30.0  [facility]  
   
                                        Comment on above:   Performed By: #### T  
SH, CMP, LIPID, CMADM, BNP, T7 ####  
[facility]  
[address] [address]  
Dr. Sam Gonzalez   
   
                                                    Creatinine   
[Mass/Vol]      0.89 mg/dL      Normal          0.66-1.25       [facility]  
   
                                        Comment on above:   Performed By: #### T  
SH, CMP, LIPID, CMADM, BNP, T7 ####  
[facility]  
[address] [place] [address]  
Dr. Sam Gonzalez   
   
                      EGFR-AF AMERICAN >60        Normal     >=60       [facility]  
   
                                        Comment on above:   Performed By: #### T  
SH, CMP, LIPID, CMADM, BNP, T7 ####  
[facility]  
[address] [address]  
Dr. Sam Gonzalez   
   
                      EGFR-NON AF AMERICAN >60        Normal     >=60       The   
[facility]  
   
                                        Comment on above:   Performed By: #### T  
SH, CMP, LIPID, CMADM, BNP, T7 ####  
[facility]  
[address] [address]  
Dr. Sam Gonzalez   
   
                                                    Globulin (S)   
[Mass/Vol]      3.1 g/dL        Normal                          [facility]  
   
                                        Comment on above:   Performed By: #### T  
SH, CMP, LIPID, CMADM, BNP, T7 ####  
[facility]  
[address] [address]  
Dr. Sam Gonzalez   
   
                      Glucose [Mass/Vol] 92 mg/dL   Normal          [facility]  
   
                                        Comment on above:   Performed By: #### T  
SH, CMP, LIPID, CMADM, BNP, T7 ####  
[facility]  
[address] [address]  
Dr. Sam Gonzalez   
   
                                                    Potassium   
[Moles/Vol]     4.7 mmol/L      Normal          3.4-5.0         [facility]  
   
                                        Comment on above:   Performed By: #### T  
SH, CMP, LIPID, CMADM, BNP, T7 ####  
[facility]  
[address] [address]  
Dr. Sam Gonzalez   
   
                      Protein [Mass/Vol] 6.8 g/dL   Normal     6.1-8.2    [facility]  
   
                                        Comment on above:   Performed By: #### T  
SH, CMP, LIPID, CMADM, BNP, T7 ####  
[facility]  
[address] [address]  
Dr. Sam Gonzalez   
   
                      Sodium [Moles/Vol] 140 mmol/L Normal     137-145    [facility]  
   
                                        Comment on above:   Performed By: #### T  
SH, CMP, LIPID, CMADM, BNP, T7 ####  
[facility]  
[address] [address]  
Dr. Sam Gonzalez   
   
                                                    Urea nitrogen   
[Mass/Vol]      21.0 mg/dL      Critically high 7.0-18.0        [facility]  
   
                                        Comment on above:   Performed By: #### T  
SH, CMP, LIPID, CMADM, BNP, T7 ####  
[facility]  
[address] [address]  
Dr. Sam Gonzalez   
   
                                                    Urea   
nitrogen/Creatinine   
[Mass ratio]    23.6 mg/mg      Normal                          [facility]  
   
                                        Comment on above:   Performed By: #### T  
SH, CMP, LIPID, CMADM, BNP, T7 ####  
[facility]  
[address], [place] [address]  
Dr. Sam Gonzalez   
   
                                                    TSHon 2022   
   
                      TSH        1.798 uIU/mL Normal     0.470-4.680 [facility]  
   
                                        Comment on above:   Performed By: #### T  
SH, CMP, LIPID, CMADM, BNP, T7 ####  
[facility]  
[address] [address]  
Dr. Sam Gonzalez   
   
                      TSH RANGE  SEE BELOW  Normal                [facility]  
   
                                        Comment on above:   Result Comment: <0.3  
4 UIU/ml HYPERTHYROID 0.34-5.60 UIU/ml   
EUTHYROID >5.60 UIU/ml HYPOTHYROID   
   
                                                            Performed By: #### T  
SH, CMP, LIPID, CMADM, BNP, T7 ####  
[facility]  
[address] [address]  
Dr. Sam Gonzalez   
  
  
  
Vital Signs  
  
  
                          Date Time    Vital Sign   Value        Performing   
Clinician                               Facility  
   
                                                    2023   
09:          Body height         170.2 cm            Sarath Montes   
APRN-CNP  
Work Phone:   
[phone]                          [facility]  
   
                                                    2023   
09:                              Body mass index   
(BMI) [Ratio]             27.41 kg/m2               Sarath Montes   
APRN-CNP  
Work Phone:   
[phone]                          [facility]  
   
                                                    2023   
09:56040          Body weight         79.38 kg            Sarath Montes   
APRN-CNP  
Work Phone:   
[phone]                          [facility]  
   
                                                    2023   
09:                              Diastolic blood   
pressure                  62 mm[Hg]                 Sarath Montes   
APRN-CNP  
Work Phone:   
[phone]                          [facility]  
   
                                                    2023   
09:          Heart rate          62 /min             Sarath Montes   
APRN-CNP  
Work Phone:   
[phone]                          [facility]  
   
                                                    2023   
09:                              Systolic blood   
pressure                  108 mm[Hg]                Sarath Montes   
APRN-CNP  
Work Phone:   
[phone]                          [facility]  
   
                                                    2023   
11:          Body height         170.18 cm           Leatha M Hoy  
Work Phone:   
[phone]                          [facility]   
[address]   
DO  
Work Phone:   
[phone]  
   
                                                    2023   
11:                              Body mass index   
(BMI) [Ratio]             27.25 kg/m2               Leatha M Hoy  
Work Phone:   
[phone]                          [facility]   
[address]   
DO  
Work Phone:   
[phone]  
   
                                                    2023   
11:                              Body surface area   
Derived from   
formula                   1.91 m2                   Leatha M Hoy  
Work Phone:   
[phone]                          [facility]   
[address]   
DO  
Work Phone:   
[phone]  
   
                                                    2023   
11:          Body weight         78.93 kg            Leatha M Hoy  
Work Phone:   
[phone]                          [facility]   
[address]   
DO  
Work Phone:   
[phone]  
   
                                                    2023   
11:                              Diastolic blood   
pressure                  74 mm[Hg]                 Leatha M Hoy  
Work Phone:   
[phone]                          [facility]   
[address]   
DO  
Work Phone:   
[phone]  
   
                                                    2023   
11:          Heart rate          62 /min             Leatha M Hoy  
Work Phone:   
[phone]                          [facility]   
[address]   
DO  
Work Phone:   
[phone]  
   
                                                    2023   
11:                              Systolic blood   
pressure                  116 mm[Hg]                Leatha M Hoy  
Work Phone:   
[phone]                          [facility]   
[address]   
DO  
Work Phone:   
[phone]  
   
                                                    2023   
13:          Body height         170.18 cm           Leatha M Hoy  
Work Phone:   
[phone]                          [facility]   
[facility]   
[address] [place]  
Work Phone:   
[phone]  
   
                                                    2023   
13:                              Body mass index   
(BMI) [Ratio]             27.49 kg/m2               Leatha TAMIKO Hoy  
Work Phone:   
[phone]                          [facility]   
[facility]   
[address] [place]  
Work Phone:   
[phone]  
   
                                                    2023   
13:                              Body surface area   
Derived from   
formula                   1.91 m2                   Leatha M Hoy  
Work Phone:   
[phone]                          [facility]   
[facility]   
[address] [place]  
Work Phone:   
[phone]  
   
                                                    2023   
13:          Body weight         79.61 kg            Leatha MORRISON Hoy  
Work Phone:   
[phone]                          [facility]   
[facility]   
[address] [place]  
Work Phone:   
[phone]  
   
                                                    2023   
13:                              Diastolic blood   
pressure                  74 mm[Hg]                 Leatha MORRISON Hoy  
Work Phone:   
[phone]                          [facility]   
[facility]   
[address] [place]  
Work Phone:   
[phone]  
   
                                                    2023   
13:          Heart rate          87 /min             Leatha TAMIKO Hoy  
Work Phone:   
[phone]                          [facility]   
[facility]   
[address] [place]  
Work Phone:   
[phone]  
   
                                                    2023   
13:                              SaO2% (BldA) [Mass   
fraction]                 96 %                      Leatha M Hoy  
Work Phone:   
[phone]                          [facility]   
[facility]   
[address] [place]  
Work Phone:   
[phone]  
   
                                                    2023   
13:                              Systolic blood   
pressure                  115 mm[Hg]                Leatha M Hoy  
Work Phone:   
[phone]                          [facility]   
[facility]   
[address] [place]  
Work Phone:   
[phone]  
   
                                                    2023   
13:                              0 1                 Leatha M Hoy  
Work Phone:   
[phone]                          [facility]   
[facility]   
[address] [place]  
Work Phone:   
[phone]  
   
                                        Comment on above:   [employer]   
   
                                                    2023   
14:          Body height         170.18 cm           Leatha M Hoy  
Work Phone:   
[phone]                          [facility]   
[facility]   
[address] [place]  
Work Phone:   
[phone]  
   
                                                    2023   
14:                              Body mass index   
(BMI) [Ratio]             27.31 kg/m2               Leatha M Hoy  
Work Phone:   
[phone]                          [facility]   
[facility]   
[address] [place]  
Work Phone:   
[phone]  
   
                                                    2023   
14:                              Body surface area   
Derived from   
formula                   1.91 m2                   Leatha M Hoy  
Work Phone:   
[phone]                          [facility]   
[facility]   
[address] [place]  
Work Phone:   
[phone]  
   
                                                    2023   
14:          Body weight         79.1 kg             Leatha M Hoy  
Work Phone:   
[phone]                          [facility]   
[facility]   
[address] [place]  
Work Phone:   
[phone]  
   
                                                    2023   
14:                              Diastolic blood   
pressure                  82 mm[Hg]                 Leatha M Hoy  
Work Phone:   
[phone]                          [facility]   
[facility]   
[address] [place]  
Work Phone:   
[phone]  
   
                                                    2023   
14:          Heart rate          98 /min             Leatha M Hoy  
Work Phone:   
[phone]                          [facility]   
[facility]   
[address] [place]  
Work Phone:   
[phone]  
   
                                                    2023   
14:                              SaO2% (BldA) [Mass   
fraction]                 96 %                      Leatha M Hoy  
Work Phone:   
[phone]                          [facility]   
[facility]   
[address] [place]  
Work Phone:   
[phone]  
   
                                                    2023   
14:                              Systolic blood   
pressure                  136 mm[Hg]                Leatha M Hoy  
Work Phone:   
[phone]                          [facility]   
[facility]   
[address] [place]  
Work Phone:   
[phone]  
   
                                                    2023   
14:                              0 1                 Leathajessica Vasquezy  
Work Phone:   
[phone]                          [facility]   
[address]   
[address] [place]  
Work Phone:   
[phone]  
   
                                        Comment on above:   [employer]   
   
                                                    2022   
12:          Body height         172.72 cm           Leatha Jansen  
Work Phone:   
[phone]                          [facility]  
in  
Work Phone:   
[phone]  
   
                                                    2022   
12:                              Body mass index   
(BMI) [Ratio]             23.72 kg/m2               Leatha M [employer]  
Work Phone:   
[phone]                          [facility]  
in  
Work Phone:   
[phone]  
   
                                                    2022   
12:                              Body surface area   
Derived from   
formula                   1.84 m2                   [facility] [facility]  
Work Phone:   
[phone]                          IV-Zfhrsbevgv-Vhksl  
in  
Work Phone:   
[phone]  
   
                                                    2022   
12:          Body weight         70.76 kg            [facility]  
Work Phone:   
[phone]                          QF-Fhhckamptk-Vrgkz  
in  
Work Phone:   
[phone]  
   
                                                    2022   
12:                              Diastolic blood   
pressure                  89 mm[Hg]                 Leatha  Som  
Work Phone:   
[phone]                          AN-Chvveqtaqg-Nkjqg  
in  
Work Phone:   
[phone]  
   
                                                    2022   
12:          Heart rate          74 /min             Leatha  Som  
Work Phone:   
[phone]                          LI-Qyofqonqkj-Sepzg  
in  
Work Phone:   
[phone]  
   
                                                    2022   
12:                              SaO2% (BldA) [Mass   
fraction]                 97 %                      Leatha M Akvolution  
Work Phone:   
[phone]                          EM-Uxcfkexfle-Vdcej  
in  
Work Phone:   
[phone]  
   
                                                    2022   
12:                              Systolic blood   
pressure                  139 mm[Hg]                Leatha M ActualMedsy  
Work Phone:   
[phone]                          ZO-Uldicezhir-Kfabv  
in  
Work Phone:   
[phone]  
   
                                                    2022   
12:                              0 1                 Leatha M Hoy  
Work Phone:   
[phone]                          QW-Zssowqhwhj-Vfbxo  
in  
Work Phone:   
[phone]  
   
                                        Comment on above:   [employer]   
   
                                                    2022   
08:                              SaO2% (BldA) [Mass   
fraction]                 97 %                      MD Leatha Hoy  
Work Phone:   
[phone]                          [facility]  
   
                                                    2022   
10:                              Diastolic blood   
pressure                  72 mm[Hg]                 Leatha M Hoy  
Work Phone:   
[phone]                          [facility]   
[facility]   
DO  
Work Phone:   
[phone]  
   
                                                    2022   
10:                              Systolic blood   
pressure                  120 mm[Hg]                Leatha M Hoy  
Work Phone:   
[phone]                          [facility]   
[facility] [address]   
DO  
Work Phone:   
[phone]  
   
                                                    2022   
10:          Body height         172.72 cm           Leatha M Hoy  
Work Phone:   
[phone]                          [facility]   
[facility] [address]   
DO  
Work Phone:   
[phone]  
   
                                                    2022   
10:                              Body mass index   
(BMI) [Ratio]             25.09 kg/m2               Leatha M Hoy  
Work Phone:   
[phone]                          [facility]   
[facility] [address]   
DO  
Work Phone:   
[phone]  
   
                                                    2022   
10:                              Body surface area   
Derived from   
formula                   1.88 m2                   Leatha M Hoy  
Work Phone:   
[phone]                          [facility]   
[facility]   
DO  
Work Phone:   
[phone]  
   
                                                    2022   
10:          Body weight         74.84 kg            Leatha M Hoy  
Work Phone:   
[phone]                          [facility]   
[facility]   
DO  
Work Phone:   
[phone]  
   
                                                    2022   
10:                              Diastolic blood   
pressure                  70 mm[Hg]                 Leatha M Hoy  
Work Phone:   
[phone]                          [facility]   
[facility]   
DO  
Work Phone:   
[phone]  
   
                                                    2022   
10:          Heart rate          63 /min             Leatha MORRISON Hoy  
Work Phone:   
[phone]                          [facility]   
[facility] [address]   
DO  
Work Phone:   
[phone]  
   
                                                    2022   
10:                              Systolic blood   
pressure                  126 mm[Hg]                Leatha MORRISON Hoy  
Work Phone:   
[phone]                          [facility]   
[facility] [address]   
DO  
Work Phone:   
[phone]  
   
                                                    2022   
15:                              45 1                Leatha MORRISON Hoy  
Work Phone:   
[phone]                          [facility]   
[facility]   
DO  
Work Phone:   
[phone]  
   
                                        Comment on above:   [id number]   
   
                                                    2022   
13:          Body height         170.18 cm           MD Leatha Jansen  
Work Phone:   
[phone]                          [facility]  
   
                                                    2022   
13:          Body weight         74.84 kg            MD Leatha Jansen  
Work Phone:   
[phone]                          [facility]  
   
                                                    2022   
13:                              Diastolic blood   
pressure                  98 mm[Hg]                 MD Leatha Jansen  
Work Phone:   
[phone]                          [facility]  
   
                                                    2022   
13:          Heart rate          74 /min             MD Leatha Jansen  
Work Phone:   
[phone]                          [facility]  
   
                                                    2022   
13:                              Systolic blood   
pressure                  149 mm[Hg]                MD Leatha Jansen  
Work Phone:   
[phone]                          [facility]  
  
  
  
Encounters  
  
  
                          Encounter Date Encounter Type Care Provider Facility  
   
                                                    Start: 2023  
End: 2023     ambulatory          SARATH MONTES       [facility]  
   
Ambulatory  
   
                                                    Start: 2023  
End: 2023                         Office outpatient visit   
15 minutes                              Sarath Montes   
APRN-CNP  
Work Phone:   
[phone]                          [facility]  
   
                                        Comment on above:   Paroxysmal atrial fi  
brillation (CMS/HCC) (Primary Dx);  
Long term current use of anticoagulant therapy;  
Nonischemic cardiomyopathy (CMS/HCC);  
Overweight with body mass index (BMI) of 27 to 27.9 in adult   
   
                                Start: 2023 ambulatory      Dr. Leatha Jansen                                     Facility:  
   
                                        Start: 2023   Office outpatient vi  
sit   
25 minutes                              Leatha Jansen  
Work Phone:   
[phone]                          [facility] [address] DO  
Work Phone:   
[phone]  
   
                                Start: 2023 Rx Renewal      Leatha TAMIKO Jansen  
Work Phone:   
[phone]                          [facility] [address] DO  
Work Phone:   
[phone]  
   
                                Start: 2023 ambulatory      Dr. Leatha Jansen                                     Facility:[facility]  
   
                                        Start: 2023   Current tobacco non-  
user   
cad cap copd pv dm                      Leatha Jansen  
Work Phone:   
[phone]                          [facility]   
[facility] [address] [place]  
Work Phone:   
[phone]  
   
                                Start: 2023 ambulatory      Dr. Leatha Jansen                                     Facility:  
   
                                Start: 2023 Chart Update    Leatha M Som  
Work Phone:   
[phone]                          [facility]   
[facility] [address] DO  
Work Phone:   
[phone]  
   
                                                    Start: 2023  
End: 2023     ambulatory          LAURA Murphy     Facility:[facility]  
   
                          Start: 2023 ambulatory   Sivan Schuler Facility:  
[facility]  
   
                                        Start: 2023   Current tobacco non-  
user   
cad cap copd pv dm                      Leatha TAMIKO Jansen  
Work Phone:   
[phone]                          [facility]   
[facility] [address] [place]  
Work Phone:   
[phone]  
   
                                        Start: 2023   Patient encounter   
procedure                               Leatha Jansen  
Work Phone:   
[phone]                          [facility]   
[facility] [address] [place]  
Work Phone:   
[phone]  
   
                                                    Start: 2022  
End: 2022           ambulatory                Dr. Donna Hernandes                         Facility:[facility]  
   
                                                    Start: 2022  
End: 2022     ambulatory          DR LEATHA JANSEN      Facility:  
   
                                                    Start: 2022  
End: 2022           ambulatory                Dr. Donna Hernandes                         Facility:[facility]  
   
                                Start: 2022 AUDIT           Leatha Jansen  
Work Phone:   
[phone]                          [facility]   
for Perioperative Med  
Work Phone:   
[phone]  
   
                                Start: 2022 ambulatory      Dr. Donna Hernandes                         Facility:[facility]  
   
                                        Start: 2022   Encounter for   
preprocedural laboratory   
examination                             Dr. Donna Hernandes                         [facility]  
   
                                Start: 2022 Message         Leatha Jansen  
Work Phone:   
[phone]                          [facility]   
[facility] [address] DO  
Work Phone:   
[phone]  
   
                                                    Start: 2022  
End: 10-     ambulatory          SHAIKH LUIS ARMANDO ROCK     Facility:[facility]  
   
                                        Start: 2022   Current tobacco non-  
user   
cad cap copd pv dm                      Leatha Jansen  
Work Phone:   
[phone]                          [facility]  
Work Phone:   
[phone]  
   
                                Start: 2022 ambulatory      Dr. Leatha Jansen                                     Facility:[facility]  
   
                                                    Start: 2022  
End: 2022     ambulatory          SHAIKH LUIS ARMANDO ROCK     Facility:[facility]  
   
                                                    Start: 2022  
End: 2022     ambulatory          DR LEATHA JANSEN      Facility:[facility]  
   
                                Start: 2022 Telephone encounter Leatha Jansen  
Work Phone:   
[phone]                          [facility]   
[facility] [address] DO  
Work Phone:   
[phone]  
   
                                                    Start: 2022  
End: 2022     ambulatory          SHAIKH LUIS ARMANDO ROCK     Facility:[facility]  
   
                                Start: 2022 Chart Update    Leatha Jansen  
Work Phone:   
[phone]                          [facility]   
[facility] [address] DO  
Work Phone:   
[phone]  
   
                                Start: 2022 Chart Update    Leatha Jansen  
Work Phone:   
[phone]                          [facility]   
[facility] [address] DO  
Work Phone:   
[phone]  
   
                                                    Start: 2022  
End: 2022     ambulatory          LAURA Murphy     Facility:[facility]  
   
                                                    Start: 2022  
End: 2022                         Admission to same day   
surgery center                          MD Leatha Jansen  
Work Phone:   
[phone]                          [facility]   
Ctr-Electrodiagnostics  
   
                                                    Start: 2022  
End: 2022     ambulatory          LAURA Murphy     Facility:[facility]  
   
                                                    Start: 2022  
End: 2022                         Patient encounter   
procedure                               MD Leatha Jansen  
Work Phone:   
[phone]                          [facility]   
Testing  
   
                                                    Start: 06-  
End: 06-     ambulatory          DR LEATHA JANSEN      Facility:[facility]  
   
                                                    Start: 2022  
End: 2022     ambulatory          SHAIKH LUIS ARMANDO ROCK     Facility:[facility]  
   
                                Start: 2022 ambulatory      Dr. Leatha Jansen                                     Facility:  
   
                                        Start: 2022   Office consultation   
new/estab patient 80 min                Leatha Jansen  
Work Phone:   
[phone]                          [facility]   
[facility] [address] DO  
Work Phone:   
[phone]  
   
                                                    Start: 2022  
End: 2022     ambulatory          LAURA Murphy     Facility:[facility]  
   
                                                    Start: 2022  
End: 2022                         Patient encounter   
procedure                               MD Leatha Jansen  
Work Phone:   
[phone]                          [facility] Ctr-Nuc Med   
[facility]  
   
                          Start: 2022 ambulatory   DR LEATHA JANSEN Facility  
:[facility]  
   
                                                    Start: 2022  
End: 2022     ambulatory          SHAIKH LUIS ARMANDO ROCK     Facility:[facility]  
   
                          Start: 2022 ambulatory   DR LEATHA JANSEN Facility  
:[facility]  
   
                                                    Start: 2022  
End: 2022     ambulatory          DR LEATHA JANSEN      Facility:[facility]  
   
                                                    Start: 2022  
End: 2022     ambulatory          DR LEATHA JANSEN      Facility:[facility]  
   
                                                    Start: 2022  
End: 2022     ambulatory          DR LEATHA JANSEN      Facility:[facility]  
  
  
  
Procedures  
  
  
                          Date         Procedure    Procedure Detail Performing   
Clinician  
   
                          Start: 2023 Echocardiography              Linda Jansen  
Work Phone:   
[phone]  
   
                          Start: 2022 SARS Antigen (LFIA)              MD Leatha Jansen  
Work Phone:   
[phone]  
   
                                        Start: 2022   Radionuclide myocard  
ial   
perfusion stress study                              MD Leatha Jansen  
Work Phone:   
[phone]  
   
                                                            Catheter ablation of  
   
arrhythmogenic focus                                Leatha MORRISON Som  
Work Phone:   
[phone]  
   
                                       Operation on mouth              Leatha MORRISON  
 Som  
Work Phone:   
[phone]  
   
                                       Procedure on middle ear              Marco MORRISON Som  
Work Phone:   
[phone]  
   
                                       Tonsillectomy              Leatha Vasquezneisha  
Work Phone:   
[phone]  
  
  
  
Plan of Treatment  
  
  
                          Date         Care Activity Detail       Author  
   
                          Start: 2024 Echocardiography Echocardiogram [facility]  
   
                                                    Start: 2024  
End: 2024                         Patient encounter   
procedure                               2024 9:30 AM EDT   
Office Visit [facility] [address]   
[address] [place]   
[address] [phone]   
Agustín Murphy DO   
[address]   
[address] [place]   
[address] [phone]   
(Work) [phone]   
(Fax)                                   [facility]  
   
                          Start: 2023 Creatinine measurement Creatinine Le  
yaw [facility]  
   
                          Start: 2023 Potassium measurement Potassium Leve  
l [facility]  
   
                                                    Start: 2023  
End: 2024                         Basic metabolic 2000 panel   
- Serum or Plasma                       Basic Metabolic Panel   
Lab Routine Paroxysmal   
atrial fibrillation   
(CMS/HCC) Long term   
current use of   
anticoagulant therapy   
Nonischemic   
cardiomyopathy   
(CMS/HCC) Expected:   
2023   
(Approximate), Expires:   
2024                              [facility]  
Work Phone:   
[phone]  
   
                                        Comment on above:   Expected: 2023  
 (Approximate), Expires: 2024   
   
                                        Start: 2023   FUV, Provider: Sarath Martinez, Status: Pen,   
Time: 9:30 AM                           FUV, Provider: Sarath Martinez, Status:   
Pen, Time: 9:30 AM                      [facility]   
[facility] [address]   
DO  
Work Phone:   
[phone]  
   
                          Start: 2023 Influenza vaccination Influenza Vacc  
ine (#1) [facility]  
   
                                        Start: 2023   ECHO, Provider: GARCIA CHEUNG   
HHVI ULTRASOUND   
[id number], Status:   
Pen, Time: 8:45 AM                      ECHO, Provider:   
LETA [facility]   
ULTRASOUND   
[id number], Status:   
Pen, Time: 8:45 AM                      [facility] [address]   
DO  
Work Phone:   
[phone]  
   
                                        Start: 2023   FUV, Provider:   
Agustní Murphy, Status:   
Pen, Time: 10:40 AM                     FUV, Provider:   
Agustín Murphy,   
Status: Pen, Time:   
10:40 AM                                [facility]pbs[facility]YLS   
[facility]   
[address] [place]  
Work Phone:   
[phone]  
   
                                        Start: 2023   FUV, Provider:   
Sivan Schuler, Status:   
Pen, Time: 1:00 PM                      FUV, Provider:   
Sivan Schuler, Status:   
Pen, Time: 1:00 PM                      [facility]-Zyeeicxaos-GYM   
[facility]   
[address] [place]  
Work Phone:   
[phone]  
   
                                        Start: 2023   SURGNONUH, Provider:  
   
George Leonard, Status:   
Pen, Time: 11:00 AM                     SURGNONUH, Provider:   
George Leonard, Status:   
Pen, Time: 11:00 AM                     [facility]efaphics[facility]   
[facility]   
[address] [place]  
Work Phone:   
[phone]  
   
                                        Start: 2022   NPV, Provider:   
Donna Hernandes, Status:   
Pen, Time: 12:30 PM                     NPV, Provider:   
Donna Hernandes,   
Status: Pen, Time:   
12:30 PM                                [facility]  
Work Phone:   
[phone]  
   
                                        Start: 2022   NPV, Provider:   
Donna Hernandes, Status:   
Pen, Time: 10:00 AM                     NPV, Provider:   
Donna Hernandes,   
Status: Pen, Time:   
10:00 AM                                [facility]  
Work Phone:   
[phone]  
   
                                        Start: 2022   FUV, Provider: Sarath Martinez, Status: Pen,   
Time: 1:30 PM                           FUV, Provider: Sarath Martinez, Status:   
Pen, Time: 1:30 PM                      [facility]   
DO  
Work Phone:   
[phone]  
   
                                        Start: 2022   [facility], Provider:  
   
George Leonard, Status:   
Pen, Time: 2:00 PM                      [facility], Provider:   
George Leonard, Status:   
Pen, Time: 2:00 PM                      [facility] [address]   
DO  
Work Phone:   
[phone]  
   
                                        Start: 2022   Radionuclide myocard  
ial   
perfusion stress study                  NM david perf SPECT rest   
& str                                   [facility]  
   
                                        Start: 2022   COVID-19 Vaccine (5   
-   
Mixed Product series)                   COVID-19 Vaccine (5 -   
Mixed Product series)                   [facility]  
   
                                        Start: 2021   Pneumococcal Vaccine  
: 65+   
Years (2 - PPSV23 or   
PCV20)                                  Pneumococcal Vaccine:   
65+ Years (2 - PPSV23   
or PCV20)                               [facility]  
   
                                Start: 09- Zoster Vaccines (1 of 2) Zoste  
r Vaccines (1 of   
2)                                      [facility]  
   
                                        Start: 09-   DTaP/Tdap/Td Vaccine  
s (1 -   
Tdap)                                   DTaP/Tdap/Td Vaccines   
(1 - Tdap)                              [facility]  
   
                                        Start: 09-   Hepatitis A Vaccines  
 (1 of   
2 - Risk 2-dose series)                 Hepatitis A Vaccines (1   
of 2 - Risk 2-dose   
series)                                 [facility]  
   
                                        Start: 09-   Diabetes mellitus   
screening                 Diabetes Screening        [facility]  
   
                          Start: 1943 Lipid panel  Lipid Panel  [facility]  
   
                                        Start: 1943   Medicare Annual Well  
ness   
Visit                                   Medicare Annual   
Wellness Visit (AWV)                    [facility]  
  
  
  
Immunizations  
  
  
                      Immunization Date Immunization Notes      Care Provider Alem quinn  
   
                                        11-          Pfizer-BioNTScloby   
COVID-19 Vacc 30   
MCG/0.3ML Intramuscular   
Suspension                                          Leatha Jansen  
Work Phone:   
[phone]                          [facility]   
DO  
Work Phone:   
[phone]  
   
                                        10-          influenza, seasonal,  
   
injectable                                          Leatha Jansen  
Work Phone:   
[phone]                          [facility]tw[facility]my  
n  
Work Phone:   
[phone]  
   
                                        10-          pneumococcal conjuga  
te   
vaccine, 13 valent                                  Leatha Jansen  
Work Phone:   
[phone]                          [facility]  
n  
Work Phone:   
[phone]  
   
                                        10-          influenza virus   
vaccine, unspecified   
formulation                                         Sarath Montes   
APRN-CNP  
Work Phone:   
[phone]                          [facility]  
Work Phone:   
[phone]  
   
                                        2021          Pfizer-BioNTech   
COVID-19 Vacc 30   
MCG/0.3ML Intramuscular   
Suspension                                          Leatha Jansen  
Work Phone:   
[phone]                          [facility] [address]   
[place]  
Work Phone:   
[phone]  
   
                                        2021          Pfizer-BioNTech   
COVID-19 Vacc 30   
MCG/0.3ML Intramuscular   
Suspension                                          Leatha Jansen  
Work Phone:   
[phone]                          [facility] [address]   
[place]  
Work Phone:   
[phone]  
   
                                        2020          Moderna COVID-19   
Vaccine 100 MCG/0.5ML   
Intramuscular   
Suspension                                          Leatha Jansen  
Work Phone:   
[phone]                          [facility]  
n  
Work Phone:   
[phone]  
  
  
  
Payers  
  
  
                          Date         Payer Category Payer        Policy ID  
   
                          2022   Self-pay                  [id number]  
[id number]  
[id number]  
   
                                2008      Medicare        MEDICARE MEDICAR  
E PART A AND   
B [id number]   
2008-Present [address]   
[address] [place] [address]             [id number]  
[id number]  
   
                          1960   Medicare                  [id number]   
[id number]  
[id number]  
   
                          1960   Self-pay                  [id number]  
   
                          1960   Unknown                   [id number]   
[id number]  
[id number]  
   
                          1943   Unknown                   [id number]   
[id number]  
   
                          1943   Unknown                   [id number]   
[id number]  
   
                          1943   Unknown                   [id number]   
[id number]  
   
                          1943   Unknown                   [id number]   
[id number]  
   
                          1943   Unknown                   [id number]   
[id number]  
   
                          1943   Unknown                   [id number]   
[id number]  
   
                          1943   Unknown                   [id number]   
[id number]  
   
                          1943   Unknown                   [id number]   
[id number]  
   
                          1943   Unknown                   [id number]   
[id number]  
   
                          1943   Unknown                   [id number]   
[id number]  
   
                          1943   Unknown                   [id number]   
[id number]  
   
                          1943   Unknown                   [id number]   
[id number]  
   
                          1943   Unknown                   [id number]   
[id number]  
   
                          1943   Unknown                   [id number]   
[id number]  
   
                          1943   Unknown                   [id number]   
[id number]  
   
                          1943   Unknown                   [id number]   
[id number]  
   
                          1943   Unknown                   [id number]   
[id number]  
   
                          1943   Unknown                   [id number]   
[id number]  
   
                          1943   Unknown                   [id number]   
[id number]  
   
                          1943   Unknown                   [id number]   
[id number]  
   
                          1943   Unknown                   [id number]   
[id number]  
   
                          1943   Unknown                   [id number]   
[id number]  
   
                          1943   Unknown                   [id number]   
[id number]  
   
                                       Unknown                     
   
                                       Unknown                   [id number]   
[id number]  
   
                                       Unknown                   [id number]   
[id number]  
   
                                       Unknown                   [id number]   
[id number]  
   
                                       Unknown                   [id number]   
[id number]  
  
  
  
Social History  
  
  
                          Date         Type         Detail       Facility  
   
                                                            [facility] stat  
[facility] if ever smoked    [facility]  
Work Phone:   
[phone]  
   
                          Start: 1945 Sex Assigned At Birth Male         F  
[facility]  
   
                                        Start: 2023   Consumes alcohol   
occasionally                            Consumes alcohol   
occasionally                            [facility]   
[facility] [address] DO  
Work Phone:   
[phone]  
   
                                        Comment on above:   2 cups of coffee del  
ly, pop three times weekly;   
   
                                                            oxycontin;   
   
                                                            quit somking at age   
34;   
   
                                                            oxycodone;   
   
                                        Start: 2023   Tobacco smoking stat  
[facility]                      Ex-smoker                 [facility]  
   
                                       History of tobacco use Current smoker [facility]  
Work Phone:   
[phone]  
   
                                       History of tobacco use Cigarette Smoker U  
[facility]  
Work Phone:   
[phone]  
   
                                        Start: 2023   Tobacco use and   
exposure                                Smokeless tobacco   
non-user                                [facility]  
Work Phone:   
[phone]  
   
                                Start: 2023 Alcohol intake  Lifetime non-d  
valentin   
(finding)                               [facility]  
Work Phone:   
[phone]  
   
                          Start: 2023 Tobacco use panel              [facility]  
Work Phone:   
[phone]  
   
                          Start: 1945 Sex Assigned At Birth Not on file  U  
[facility]  
Work Phone:   
[phone]  
   
                                                    Start: 10-  
End: 2023                         Exposure to SARS-CoV-2   
(event)                   Not sure                  [facility]  
  
  
  
Clinical Notes 2022 to 2023  
     Assessment & Plan Note - DANG Mullins - 2023 12:24 PM   
EDTAssessment & Plan Note - DANG Mullins - 2023 12:24 PM 
EDTPatient Instructions  
  
                                Note Date & Type Note            Facility  
   
                                                    2023 Evaluation +   
Plan note                               Associated Problem(s): Overweight   
with body mass index (BMI) of 27 to   
27.9 in adult  
Formatting of this note might be   
different from the original.  
Reviewed the merits of healthy   
lifestyle choices on overall   
cardiovascular health.  
  
Electronically signed by DANG Mullins at 2023 12:24   
PM EDT  
                                        [facility]  
Work Phone:   
[phone]  
   
                                                    2023 Evaluation +   
Plan note                               Associated Problem(s): Long term   
current use of anticoagulant   
therapy  
Formatting of this note might be   
different from the original.  
CHADS VASc 4 anticoagulated full   
dose Xarelto  
Currently denies bleeding diatheses  
Needs annual labs for creatinine  
Electronically signed by DANG Mullins at 2023 12:24   
PM EDT  
                                        [facility]  
Work Phone:   
[phone]  
   
                                                    2023 Evaluation +   
Plan note                               Associated Problem(s): Cardiac and   
Vasculature  
Formatting of this note might be   
different from the original.  
May 2022 MPI no ischemia  
Daily activity greater than 4 METS   
without concerning symptoms  
Electronically signed by DANG Mullins at 2023 12:24   
PM EDT  
                                        [facility]  
Work Phone:   
[phone]  
   
                                                    2023 Miscellaneous   
Notes                                   Associated Problem(s): Overweight   
with body mass index (BMI) of 27 to   
27.9 in adult  
Formatting of this note might be   
different from the original.  
Reviewed the merits of healthy   
lifestyle choices on overall   
cardiovascular health.  
  
Electronically signed by DANG Mullins at 2023 12:24   
PM EDT  
Associated Problem(s): Long term   
current use of anticoagulant   
therapy  
Formatting of this note might be   
different from the original.  
CHADS VASc 4 anticoagulated full   
dose Xarelto  
Currently denies bleeding diatheses  
Needs annual labs for creatinine  
Electronically signed by DANG Mullins at 2023 12:24   
PM EDT  
Associated Problem(s): Cardiac and   
Vasculature  
Formatting of this note might be   
different from the original.  
May 2022 MPI no ischemia  
Daily activity greater than 4 METS   
without concerning symptoms  
Electronically signed by DANG Mullins at 2023 12:24   
PM EDT  
Associated Problem(s): Nonischemic   
cardiomyopathy (CMS/HCC)  
Formatting of this note might be   
different from the original.  
May 2022 TTE EF 40-45% (afib)  
  
May 2023 TTE  
LVEF 50-55%  
LA mild/moderate  
RA mod/severe dilated  
MR mild  
RVSP 36  
  
To continue GDMT  
Electronically signed by DANG Mullins at 2023 12:23   
PM EDT  
Associated Problem(s): Atrial   
fibrillation (CMS/HCC)  
Formatting of this note might be   
different from the original.  
Dec 2022 RFA ablation  
2023 DCC  
Denies recurrent symptoms  
Electronically signed by DANG Mullins at 2023 12:22   
PM EDT  
documented in this encounter            [facility]  
Work Phone:   
[phone]  
   
                                                    2023 Evaluation +   
Plan note                               Associated Problem(s): Nonischemic   
cardiomyopathy (CMS/HCC)  
Formatting of this note might be   
different from the original.  
May 2022 TTE EF 40-45% (afib)  
  
May 2023 TTE  
LVEF 50-55%  
LA mild/moderate  
RA mod/severe dilated  
MR mild  
RVSP 36  
  
To continue GDMT  
Electronically signed by DANG Mullins at 2023 12:23   
PM T  
                                        [facility]  
Work Phone:   
[phone]  
   
                                                    2023 Evaluation +   
Plan note                               Associated Problem(s): Atrial   
fibrillation (CMS/HCC)  
Formatting of this note might be   
different from the original.  
Dec 2022 RFA ablation  
2023 DCC  
Denies recurrent symptoms  
Electronically signed by DANG Mullins at 2023 12:22   
PM EDT  
                                        [facility]  
Work Phone:   
[phone]  
   
                                                    2023 History of   
Present illness Narrative               Formatting of this note is   
different from the original.  
Chief Complaint  
 Doing just fine   
  
Reason for Visit  
Routine 6-month follow-up  
Patient presents to the office   
today for outpatient follow-up for   
atrial fibrillation,   
anticoagulation, cardiomyopathy.  
Last evaluated in clinic by Dr. Murphy May 2023.  
  
Presents today ambulatory with   
steady gait.  
Accompanied by spouse  
Patient denies any hospitalizations   
or significant changes to interval   
medical history since last office   
follow-up.  
No routine PCP follow-up, no labs   
this year.  
  
History of Present Illness  
Patient is an extremely pleasant   
78-year-old gentleman who presents   
without voice cardiovascular   
complaints. Remains aerobically   
active where he walks 2 miles   
daily, over the summer was able to   
do yard work, he has stairs that he   
needs to go up and down at home. He   
denies any type of exertional   
complaints. He ambulated in from   
the parking lot without concerns.  
  
He reports being highly symptomatic   
in atrial fibrillation and denies   
any recurrence.  
Remains anticoagulated. Need to   
check renal function for creatinine   
clearance.  
  
Improved LVEF 50 to 55%. Discussed   
rationale behind remaining on GDMT.  
  
Patient reports that overall has no   
complaint(s) of chest pain,   
exertional chest   
pressure/discomfort, irregular   
heart beat, and palpitations  
  
Daily activity: aerobically active,   
walks 2 miles  
Denies any change in exercise   
capacity or functional tolerance   
since last office visit.  
  
Overall patient is pleased with   
current state of cardiovascular   
health. At this time there are no   
indications for additional   
cardiovascular testing or need for   
medication changes.  
  
Discussed the dynamic nature of   
coronary artery disease and the   
importance of seeking medical   
attention if new symptoms arise.  
  
Review of Systems  
Cardiovascular: Negative for chest   
pain, dyspnea on exertion,   
irregular heartbeat, leg swelling,   
near-syncope, orthopnea,   
palpitations, paroxysmal nocturnal   
dyspnea and syncope.  
  
  
Visit Vitals  
/62 (BP Location: Right arm,   
Patient Position: Sitting)  
Pulse 62  
Ht 1.702 m (5' 7 )  
Wt 79.4 kg (175 lb)  
BMI 27.41 kg/m  
Smoking Status Former  
BSA 1.94 m  
  
Physical Exam  
Vitals and nursing note reviewed.  
Constitutional:  
Appearance: Normal appearance.  
Cardiovascular:  
Rate and Rhythm: Normal rate and   
regular rhythm.  
Heart sounds: Normal heart sounds.  
Pulmonary:  
Effort: Pulmonary effort is normal.  
Breath sounds: Normal breath   
sounds.  
Musculoskeletal:  
Cervical back: Full passive range   
of motion without pain.  
Right lower leg: No edema.  
Left lower leg: No edema.  
Skin:  
General: Skin is cool.  
Neurological:  
Mental Status: He is alert and   
oriented to person, place, and   
time.  
Psychiatric:  
Attention and Perception: Attention   
normal.  
Mood and Affect: Mood normal.  
Behavior: Behavior is cooperative.  
  
  
No Known Allergies  
  
Current Outpatient Medications  
Medication Instructions  
carvedilol (COREG) 3.125 mg, oral,   
2 times daily  
losartan (Cozaar) 25 mg tablet 1   
tablet, oral, Daily  
rivaroxaban (XARELTO) 20 mg, oral,   
Daily, Take with food.  
spironolactone (ALDACTONE) 25 mg,   
oral, Daily  
  
Assessment:  
  
Atrial fibrillation (CMS/HCC)  
Dec 2022 RFA ablation  
2023 DCC  
Denies recurrent symptoms  
  
Nonischemic cardiomyopathy   
(CMS/HCC)  
May 2022 TTE EF 40-45% (afib)  
  
May 2023 TTE  
LVEF 50-55%  
LA mild/moderate  
RA mod/severe dilated  
MR mild  
RVSP 36  
  
To continue GDMT  
  
Cardiac and Vasculature  
May 2022 MPI no ischemia  
Daily activity greater than 4 METS   
without concerning symptoms  
  
Long term current use of   
anticoagulant therapy  
CHADS VASc 4 anticoagulated full   
dose Xarelto  
Currently denies bleeding diatheses  
Needs annual labs for creatinine  
  
Overweight with body mass index   
(BMI) of 27 to 27.9 in adult  
Reviewed the merits of healthy   
lifestyle choices on overall   
cardiovascular health.  
  
Plan:  
  
Through informed decision making   
process incorporating patients   
unique circumstances, the following   
treatment plan will be initiated:  
  
1. Prescription drug management of   
cardiovascular medication for   
efficacy, adherence to treatment,   
side effect assessment and   
polypharmacy. Current treatment   
clinically warranted and to   
continue without modifications.  
  
2. Chem6  
  
3. Return for follow-up; in the   
interim, contact the office if new   
symptoms arise.  
Dr. Murphy 6 months  
  
Sarath Montes MSN, APRN-CNP,   
PMHNP-[facility]  
  
Please excuse any errors in grammar   
or translation related to this   
dictation. Voice recognition   
software was utilized to prepare   
this document.  
  
Electronically signed by DANG Mullins at 2023 12:29   
PM EDT  
documented in this encounter            [facility]  
Work Phone:   
[phone]  
   
                                        2023 Instructions   
  
  
DANG Mullins -   
2023 9:30 AM EDTFormatting of   
this note might be different from   
the original.  
Please bring all medicines,   
vitamins, and herbal supplements   
with you when you come to the   
office.  
  
Prescriptions will not be filled   
unless you are compliant with your   
follow up appointments or have a   
follow up appointment scheduled as   
per instruction of your physician.   
Refills should be requested at the   
time of your visit.  
  
PLAN:  
Through informed decision making   
process incorporating patients   
unique circumstances, the following   
treatment plan will be initiated:  
  
1. Prescription drug management of   
cardiovascular medication for   
efficacy, adherence to treatment,   
side effect assessment and   
polypharmacy. Current treatment   
clinically warranted and to   
continue without modifications.  
  
2. Chem6  
  
3. Return for follow-up; in the   
interim, contact the office if new   
symptoms arise.  
Dr. Murphy 6 months  
  
Electronically signed by DANG Mullins at 2023 10:11   
AM EDT  
  
documented in this encounter            [facility]  
Work Phone:   
[phone]  
   
                                        2022 Note     History of Present I  
llness:  
History Present Illness:  
Reason for surgery: Afib ablation  
HPI:  
81 year old with at night of   
rotator cuff, and cocaine use in   
2017 , oxycodone  
abuse stopped 6 months ago, was   
diagnosed with NICM with ef of 45%,   
in sitting  
of afib , ( tachymedicated   
cardiomyopathy)  
  
Treatment of his AF includes   
carvedilol. Pt has not required   
DCCV or AADs.  
Symptoms of his AF include   
palpitations and DELEON.  
  
Pt recently followed up with his   
PCP in the end of April/early May   
and pt was  
noted to be in AF. Pt had a history   
of irregular heartbeat but no   
diagnosis of  
AF. Pt was started on Eliquis.  
  
Pt established with Dr Murphy with   
the plan for DCCV. His DCCV was   
cancelled  
because pt had COVID. Pt is not on   
ay medications for rate control. Pt   
has  
stopped all his medications almost   
1 week ago including his Eliquis.   
pt has a  
history of Oxycodone abuse which he   
was started on initially in    
for a  
rotator cuff injury. Pt has been   
obtaining the medications on the   
street not  
through prescription. Pt has a   
recent accidental overdose which   
required ED  
visit an reversal with Narcan. Pt   
was take to rehab by his daughter   
about 1  
week ago but left the same day.  
  
  
The indications, risks, benefits,   
alternatives, and details of the   
procedure  
were explained to the patient who   
expressed understanding of the   
risks  
including but are not limited to:   
pain, bleeding, and infection for   
which the  
risk is less than 1%. More serious   
risks, including cardiac   
perforation and  
tamponade, vascular trauma,   
pulmonary vein stenosis,   
atrio-esophageal fistula,  
diaphragmatic paralysis,   
life-threatening arrhythmia, need   
for permanent  
pacemaker, stroke, heart attack,   
and/or death, for which the overall   
risk  
ranges 0.1-1%. After all questions   
were answered, the patient provided   
informed  
and written consent.  
  
patient had Afib abd CTI line   
ablation , with no complication ,   
he will be  
admitted because it is a late case.   
will need to get his AC tonight ,   
AC  
can't be held without calling EP   
fellow on call.  
  
Allergies:  
Allergies:  
No Known Allergies:  
  
Home Medication Review:  
Home Medications Reviewed: yes  
  
Impression/Procedure:  
Impression and Planned Procedure:   
afib ablation  
  
  
ERAS (Enhanced Recovery After   
Surgery):  
ERAS Patient: no  
  
  
Physical Exam by System:  
  
Respiratory/Thorax: Patent airways,   
CTAB, normal breath sounds with   
good chest  
expansion, thorax symmetric  
Cardiovascular: Regular, rate and   
rhythm, no murmurs, 2+ equal pulses   
of the  
extremities, normal S 1and S 2  
  
Airway/Sedation Assessment:  
Assmentment by AnesthesiaSee   
anesthesia airway/sedation   
assessment.  
  
Consent:  
COVID-19 Consent:  
COVID-19 Risk ConsentSurgeon has   
reviewed key risks related to the   
risk of  
stan COVID-19 and if they   
contract COVID-19 what the risks   
are.  
  
Coronavirus Attestation of Need for   
Surgery:  
COVID-19 Surgery / Procedure   
Attestation: Select all criteria   
that applyRisk  
of metastasis or progression of   
staging if delayed  
  
Attestation:  
Note Completion:  
I am a: Resident/Fellow  
Attending AttestationI saw and   
evaluated the patient. I personally   
obtained  
the key and critical portions of   
the history and physical exam or   
was  
physically present for key and   
critical portions performed by the  
resident/fellow. I reviewed the   
resident/fellows documentation and   
discussed  
the patient with the   
resident/fellow. I agree with the   
resident/fellows  
medical decision making as   
documented in the note.  
I personally evaluated the patient   
on13-Dec-2022  
  
  
  
Electronic Signatures:  
Donna Hernandes) (Signed   
23-Dec-2022 09:05)  
Co-Signer: History of Present   
Illness, Allergies, Home Medication   
Review,  
Impression/Procedure, ERAS,   
Physical Exam, Consent, Note   
Completion  
Jessica Johnson (Resident))   
(Signed 13-Dec-2022 18:57)  
Authored: History of Present   
Illness, Allergies, Home Medication   
Review,  
Impression/Procedure, ERAS,   
Physical Exam, Consent, Note   
Completion  
  
  
Last Updated: 23-Dec-2022 09:05 by   
Donna Hernandes)                   [facility]  
   
                                        2022 Note     Pre-procedure Verifi  
cation and Time   
Out:  
Pre-Procedure Verification and Time   
Out:  
Procedure Locationprocedure area  
HUDDLE - Pre-procedure   
Verificationcompleted  
TIME OUT - Final   
Verificationcompleted immediately   
prior to procedure start  
DEBRIEFcompleted  
  
General Information:  
  
Anesthesia Critical Care:   
Non-Anesthesia  
Date/Time of Procedure: 13-Dec-2022   
18:40  
Post-Procedure Diagnosis:   
successful afib ablation  
successful CTI line ablation  
Procedure Name: Afib ablation  
Findings: grossly normal anatomy  
Procedure performed by: Dr. Hernandes  
Assistant(s): Jessica johnson  
Estimated Blood Loss (mL): 10  
Specimen: no  
Informed Consent: written consent   
obtained  
  
Procedure Details:  
  
Procedure Details:  
Radiofrequency ablation for atrial   
fibrillation  
  
The indications, risks, benefits,   
alternatives, and details of the   
procedure  
were explained to the patient who   
expressed understanding of the   
risks  
including but are not limited to:   
pain, bleeding, and infection for   
which the  
risk is less than 1%. More serious   
risks, including cardiac   
perforation and  
tamponade, vascular trauma,   
pulmonary vein stenosis,   
atrio-esophageal fistula,  
diaphragmatic paralysis,   
life-threatening arrhythmia, need   
for permanent  
pacemaker, stroke, heart attack,   
and/or death, for which the overall   
risk  
ranges 0.1-1%. After all questions   
were answered, the patient provided   
informed  
and written consent.  
  
The patient presented to the EP lab   
in the fasting state and was in   
Afib. The  
patient was prepped and draped per   
usual sterile protocol. Access was   
obtained  
in the right femoral vein using the   
modified Seldinger technique with   
the aid  
of ultrasound. Three sheaths (8Fr,   
8.5Fr, 8.5Fr) were placed in the   
right  
femoral vein. Intracardiac echo was   
used to evaluate for any baseline   
effusion.  
There was no pericardial effusion   
at baseline. A Split Duodeca was   
placed in  
the HRA and CS. The proximal sheath   
was then exchanged for a long   
steerable  
[place] sheath over a wire.   
Transseptal catheterization was   
performed with a  
transseptal radiofrequency [place]   
needle under fluoroscopic and   
intracardiac  
echocardiographic guidance.  
  
A left atrial voltage map was   
constructed using Carto3 and a   
PentaRay mapping  
catheter. There were very low left   
atrial voltages on mapping sparing   
the  
SAMSON. There were potentials at all   
four pulmonary veins. A BiosClinithink   
[place]  
ST-SF catheter was used for   
ablation. Isolation of all 4 PVs   
was achieved with  
bilateral RF wide-area   
circumferential ablation (WACA)   
lesions and carinal  
lines. There were no potentials at   
the pulmonary veins after ablation,   
Anterior  
wall was isolated by creating 2   
lines from WACA to mitral valve   
annuals,  
patient develope 2:1 arial flutter   
by touching the inferior wall TCL   
was 240ms  
it was poroved to be Lt sided   
flutter by entrainment, it was   
terminated by  
ablating on the inferioir wall ,   
then he develop CTI clockwise   
dependent  
flutter TCL 320ms which was   
terminated by CTI line ablation.   
bidirectional  
block was achieved with >240 ms   
blocks . Adjuvant lesions were   
applied to the  
posterolateral mitral valve annulus   
(Vein of Hermelindo region),   
anteroseptal  
mitral valve annulus, posteroseptal   
mitral valve annulus, SVC-RA septal  
junction, and low mian   
terminalis. The patient was in NSR   
rhythm at the end  
of the case.  
  
**The patient was successfully   
cardioverted with restoration of   
sinus rhythm.  
  
  
There was no pericardial effusion   
on intracardiac echo at the end of   
the  
procedure. The long sheath was   
exchanged for a short 8.5 Fr sheath   
over a wire.  
All sheaths were removed and   
hemostasis was achieved using   
Vascade at all three  
intravenous access sites and manual   
pressure. Throughout the procedure,   
the  
heparin infusion rate was adjusted   
to maintain an ACT between 350-400   
sec.  
Protamine was given to reverse   
anticoagulation at procedure end.  
  
Successful ablation for atrial   
fibrillation with RF isolation of   
all four  
pulmonary veins and adjuvant   
lesions. The patient tolerated the   
procedure well  
with no immediate complications.  
  
  
Plan:  
1. Return home today.  
2. Bedrest for 2 hours then   
ambulate as tolerated.  
3. Continue current home   
medications as prescribed.  
4. Pantoprazole for 4 weeks.  
Tolerance: good  
Complications: none  
  
Attestation:  
Note Completion:  
I am a:Resident/Fellow  
Attending AttestationI was present   
for the entire procedure  
  
  
  
Electronic Signatures:  
Donna Hernandes) (Signed   
23-Dec-2022 09:04)  
Co-Signer: Pre-procedure   
Verification and Time Out, General   
Information,  
Procedure Details, Note Completion  
Jessica Johnson (Resident))   
(Signed 13-Dec-2022 18:47)  
Authored: Pre-procedure   
Verification and Time Out, General   
Information,  
Procedure Details, Note Completion  
  
  
Last Updated: 23-Dec-2022 09:04 by   
Donna Hernandes)                   [facility]  
   
                                        2022 Note     Clinical Note - Phar  
daryl v2:  
Education:  
Document TopicMedication Education  
Is This Intervention Medication   
Reconciliation Relatedyes  
Time Krvrnboe36-73 minutes  
Learnerpatient  
Barriersnone  
Methodverbal  
Additional NotesSOURCES USED TO   
CONFIRM HOME MEDICATION LIST  
- Patient with prompting  
- Fill history verified online   
using [employer]  
- OARRS - no pertinent hx  
  
CRITICAL CATH LAB MEDS  
Aspirin: not taking  
Statin: not prescribed  
P2Y12 inhibitor: not prescribed  
Anticoagulant: Eliquis 5 mg  
  
MEDICATION CHANGES  
- Changed Protonix to Prilosec PRN  
  
MEDICATIONS ADDED  
- Lasix  
  
MEDICATION LIST  
Drug Name: eplerenone 25 mg oral   
tablet  
Instructions: 1 tab(s) oral once a   
day  
  
Drug Name: losartan 25 mg oral   
tablet  
Instructions: 1 tab(s) oral once a   
day  
  
Drug Name: multivitamin Multiple   
Vitamins oral tablet  
Instructions: 1 tab(s) oral once a   
day  
  
Drug Name: apixaban 5 mg oral   
tablet  
Instructions: 1 tab(s) orally 2   
times a day  
  
Drug Name: carvedilol 6.25 mg oral   
tablet  
Instructions: 1 tab(s) orally 2   
times a day (with meals)  
  
Drug Name: furosemide 40 mg oral   
tablet  
Instructions: 1 tab(s) orally once   
a day  
  
Drug Name: acetaminophen 500 mg   
oral tablet  
Instructions: 2 tab(s) orally every   
6 hours, As Needed  
  
Please reach out via DocHalo for   
questions  
Precious Miller, [facility]   PGY1   
Pharmacy Resident  
  
Allergy:  
Allergies Summary  
No documented data.  
  
  
  
Electronic Signatures:  
Raulito Bernard (PharmD) (Signed   
13-Dec-2022 13:45)  
Authored: Education, Allergy  
Trisha Villarreal ([facility]) (Signed   
13-Dec-2022 15:25)  
Co-Signer: Education, Allergy  
  
Last Updated: 13-Dec-2022 15:25 by   
Trisha Villarreal ([facility])                  [facility]  
   
                                        2022 Note     PROCEDURE: XR SHOULD  
ER RT 2V or >  
HISTORY: Shoulder joint pain ,   
chronic  
COMPARISON: None.  
FINDINGS:  
BONES:Narrowing of the   
acromioclavicular joint and large   
undersurface  
osteophytes. Unremarkable humeral   
head and glenohumeral joint.  
SOFT TISSUES:Corticated   
calcification cephalad to the   
humerus near insertion of  
superior rotator cuff.  
EFFUSION:None visible.  
OTHER: Negative.  
IMPRESSION:  
1. Marked degenerative changes of   
the acromioclavicular joint which   
would  
predispose to rotator cuff injury.  
2. Calcification near region of   
superior rotator cuff tendon   
insertion into the  
humeral head suggestive of calcific   
tendinitis.  
Electronically authenticated by:   
MEHRDAD ZIMMER Date: 2022   
16:26                                   [facility]  
   
                                                    Chief complaint Narrative -   
Reported                                ENMANUEL ARRINGTON is being seen for a   
consultation for HOY ABN   
ECHO.76-year-old gentleman seen in   
cardiology consultation at the   
request of Dr. Jansen for abnormal   
echocardiogram and stress imaging.   
He complains of exertional dyspnea   
he states that occurred around or   
just prior to his first booster   
shot for COVID. He also admits that   
he probably had COVID illness but   
was never tested earlier in the   
year.His chief complaints right now   
are occasional lower extremity   
edema right greater than left that   
is improved with transient use of   
diuretics administered by Dr. Jansen.   
Exertional dyspnea with any type of   
walking or walking up stairs or   
work related activities. He denies   
angina, true heart failure   
hospitalizations, syncope. HeHe   
does admit to history of irregular   
heartbeat dating back to  when   
he was in [place] details of   
which are unknown. 4 weeks ago when   
in Dr. Jansen's office for the first   
time in many years, ECG was   
performed revealing atrial   
fibrillation with controlled   
ventricular response. Subsequent   
stress perfusion imaging and   
echocardiography were performed at   
[facility] revealing normal   
perfusion scan, but atrial   
fibrillation so no ejection   
fraction estimate was performed on   
perfusion scan. Echocardiogram   
revealed reduced left ventricular   
function with ejection fraction   
estimated to be 40 to 45%.He was   
then placed on Eliquis for his A.   
fib, and it appears that he has   
been on Eliquis for 2 to 3 weeks.   
Patient and his wife will   
corroborate when his start date for   
Eliquis was and return that   
information.My estimation is that   
he has left ventricular systolic   
heart failure likely secondary to   
chronic atrial fibrillation   
(duration of which is unknown). It   
is worth a shot at least to try   
cardioversion at least once in   
order to restore   
rhythm.Recommendations, informed   
decision-making process, risk   
benefits alternatives of discussed   
with patient. We will proceed with   
guideline directed medical   
therapies including institution of   
carvedilol, losartan, eplerenone,   
and elective cardioversion in the   
end of  at least 6 weeks after   
its initiation of Eliquis, and   
follow-up thereafterwards. We may   
need to institute loop diuretics if   
necessary. Otherwise if atrial   
fibrillation continues to be   
problematic I will have to refer   
him either to general cardiology or   
electrophysiology in the future   
otherwise I will be happy to take   
care of the left ventricular   
function issues at this moment.         [facility]   
[address] DO  
Work Phone:   
[phone]  
   
                                                    Chief complaint Narrative -   
Reported                                ENMANUEL ARRINGTON is being seen for a   
cardiovascular evaluation of atrial   
fibrillation.                           [id number]  
Work Phone:   
[phone]  
   
                                                    Chief complaint Narrative -   
Reported                                ENMANUEL ARRINGTON is being seen for a   
cardiovascular evaluation of atrial   
fibrillation.                           [id number]   
[address] [place]  
Work Phone:   
[phone]  
   
                                Evaluation note No assessment information availa  
[facility]  
Work Phone:   
[phone]  
  
  
  
                                                    Evaluation note   
  
  
  
                                                    Diagnosis  
   
                                                      
  
  
Paroxysmal atrial fibrillation (CMS/HCC)- Primary  
  
  
Atrial fibrillation  
   
                                                      
  
  
Long term current use of anticoagulant therapy  
   
                                                      
  
  
Nonischemic cardiomyopathy (CMS/HCC)  
  
  
Other primary cardiomyopathies  
   
                                                      
  
  
Overweight with body mass index (BMI) of 27 to 27.9 in adult  
  
documented in this encounter  
[facility]  
Work Phone: [phone] of Present illness Narrative* Enmanuel Arrington is a 
  75 y/o male referred by Dr Murphy for evaluation on AF.  
* PMH includes NICM, R should rotator cuff injury, cocaine abuse x 17 yrs (rehab
   in [place] now sober), oxycodone abuse (started in ) w/ accidental overdose 
  and AF.  
* Treatment of his AF includes carvedilol. Pt has not required DCCV or AAD s.  
* Symptoms of his AF include palpitations and DELEON.  
* Pt recently followed up with his PCP in the end of April/early May and pt was 
  noted to be in AF. Pthad a history of irregular heartbeat but no diagnosis of 
  AF. Pt was started on Eliquis.  
* Pt established with Dr Murphy with the plan for DCCV. His DCCV was cancelled 
  because pt had COVID.Pt is not on ay medications for rate control. Pt has 
  stopped all his medications almost 1 week ago including his Eliquis. pt has a 
  history of Oxycodone abuse which he was started on initially in for a 
  rotator cuff injury. Pt has been obtaining the medications on the street not 
  through prescription. Pt has a recent accidental overdose which required ED 
  visit an reversal with Narcan. Pt was take to rehab by his daughter about 1 
  week ago but left the same day.  
* Pt reports that he has been sober for 6 days. Pt presents today to discuss AF 
  ablation.  
* Echo 2022: EF 40-45%, septal akinesis, LA mildly dilated  
* Stress test 2022: no ischemia or myocardial infarction  
[facility]  
Work Phone: [phone] of Present illness Narrative* Enmanuel Arrington is a 
  75 y/o male referred by Dr Murphy for evaluation on AF.  
* PMH includes NICM, R should rotator cuff injury, cocaine abuse x 17 yrs (rehab
   in [place] now sober), oxycodone abuse (started in ) w/ accidental overdose 
  and AF.  
* Treatment of his AF includes carvedilol. Pt has not required DCCV or AAD s.  
* Symptoms of his AF include palpitations and DELEON.  
* Pt recently followed up with his PCP in the end of April/early May and pt was 
  noted to be in AF. Pthad a history of irregular heartbeat but no diagnosis of 
  AF. Pt was started on Eliquis.  
* Pt established with Dr Murphy with the plan for DCCV. His DCCV was cancelled 
  because pt had COVID.Pt is not on ay medications for rate control. Pt has 
  stopped all his medications almost 1 week ago including his Eliquis. pt has a 
  history of Oxycodone abuse which he was started on initially in for a 
  rotator cuff injury. Pt has been obtaining the medications on the street not 
  through prescription. Pt has a recent accidental overdose which required ED 
  visit an reversal with Narcan. Pt was take to rehab by his daughter about 1 
  week ago but left the same day.  
* Pt reports that he has been sober for 6 days. Pt presents today to discuss AF 
  ablation.  
* Echo 2022: EF 40-45%, septal akinesis, LA mildly dilated  
* Stress test 2022: no ischemia or myocardial infarction  
[facility] [address] [place]  
Work Phone: [phone] of Present illness Narrative* Enmanuel Arrington is a 
  78 y/o male initially referred by Dr Murphy for evaluation on AF.  
* PMH includes NICM, R should rotator cuff injury, cocaine abuse x 17 yrs (rehab
   in [place] now sober), oxycodone abuse (started in ) w/ accidental overdose 
  and AF.  
* Treatment of his AF includes carvedilol. Pt has not required DCCV or AAD s.  
* Symptoms of his AF include palpitations and DELEON.  
* Pt recently followed up with his PCP in the end of April/early May and pt was 
  noted to be in AF. Pthad a history of irregular heartbeat but no diagnosis of 
  AF. Pt was started on Eliquis.  
* Pt established with Dr Murphy with the plan for DCCV. His DCCV was cancelled 
  because pt had COVID.. Pt has a history of Oxycodone abuse which he was 
  started on initially in  for a rotator cuff injury. Pt has been obtaining 
  the medications on the street not through prescription. Pt had a recent
  accidental overdose which required ED visit an reversal with Narcan. Pt was 
  take to rehab by his daughter about 1 week ago but left the same day.  
* Pt reports that he has been sober since August  
* Echo 2022: EF 40-45%, septal akinesis, LA mildly dilated  
* Stress test 2022: no ischemia or myocardial infarction  
* Now s/p PVI and adjuvant lesion RFA with Dr. Hernandes 2022  
* Patient unfortunately caught a URI around [place] and also experienced a 
  fever. He was started onantibiotics around  and has a couple of more days
   still before they are complete.  
* ECG 2023 Atypical Aflutter HR 95 bpm.  
* TODAY Patient presents for 3 week follow up post ablation. He denies any 
  arrhythmia symptoms. He isstill coughing, especially at night due to his URI, 
  but the SOB is improving after the antibiotics.He denies any palpitations, 
  syncope, heart racing, and chest pain. He denies any bleeding/bruising/s
  welling at the right groin site. His wife is present with him and says he is 
  taking all his medication properly. She would like a refill on the Coreg with 
  the correct dosage so she doesn't have to cut the pill in half  
[facility] [address] [place]  
Work Phone: [phone] of Present illness Narrative* Enmanuel Arrington is a 
  78 y/o male initially referred by Dr Murphy for evaluation on AF.  
* PMH includes NICM, R should rotator cuff injury, cocaine abuse x 17 yrs (rehab
   in [place] now sober), oxycodone abuse (started in ) w/ accidental overdose 
  and AF.  
* Treatment of his AF includes carvedilol. Pt has not required DCCV or AAD s.  
* Symptoms of his AF include palpitations and DELEON. .  
* Pt established with Dr Murphy with the plan for DCCV. His DCCV was cancelled 
  because pt had COVID.. Pt has a history of Oxycodone abuse which he was 
  started on initially in  for a rotator cuff injury. Pt has been obtaining 
  the medications on the street not through prescription. Pt had a recent
  accidental overdose which required ED visit an reversal with Narcan. Pt was 
  take to rehab by his daughter about 1 week ago but left the same day.  
* Pt reports that he has been sober since August  
* Echo 2022: EF 40-45%, septal akinesis, LA mildly dilated  
* Stress test 2022: no ischemia or myocardial infarction  
* Now s/p PVI and adjuvant lesion RFA with Dr. Hernandes 2022  
* Patient unfortunately caught a URI around [place] and also experienced a 
  fever. He was started onantibiotics around   
* ECG 2023 Atypical Aflutter HR 95 bpm.  
* S/P DCCV with Dr. Murphy  
* ECG 3/27/2023 NSR with first degree AV block, HR 82 bpm  
* TODAY Patient presents for 3 month follow up post Afib ablation. He is feeling
   good. He denies any arrhythmia symptoms since his cardioversion. Per his 
  wife, he still gets tired throughout the day and takes daily naps. But 
  overall, he no longer feels palpitations or DELEON.  
[facility] [address] [place]  
Work Phone: [phone] for referral (narrative)* Consultation 
  (Routine) - Authorized  
  
                          Specialty    Diagnoses / Procedures Referred By Contac  
t Referred To Contact  
   
                                        Cardiology            
  
  
Diagnoses  
  
  
Paroxysmal atrial   
fibrillation (CMS/HCC)  
  
  
Long term current use of   
anticoagulant therapy  
  
  
  
Procedures  
  
  
Follow Up In Cardiology                   
  
  
Sarath Montes APRN-CNP  
  
  
[address] [facility] [address]  
  
  
[place] [address]  
  
  
Phone: [phone]  
  
  
Fax: [phone]                         
  
  
Agustín Murphy,   
  
  
[address]  
  
  
[facility] [address]  
  
  
[place] [address]  
  
  
Phone: [phone]  
  
  
Fax: [phone]  
  
  
  
                    Referral ID Status    Reason    Start Date Expiration Date V  
isits   
Requested                               Visits   
Authorized  
   
                [id number] Authorized         2023 10/31/2024 1       1  
  
  
  
  
Electronically signed by Sarath HAMLIN-CNP at 2023 10:12 AM [facility]  
Work Phone: [phone]  
  
Chief Complaint and Reason for Visit  
  
  
                                        Chief Complaint     dyspnea palpitations  
 chest pain afib  
  
  
  
                                        Chief Complaint     dyspnea palpitations  
 chest pain afib  
Afib  
  
  
  
                                        Chief Complaint     dyspnea palpitations  
 chest pain afib  
Afib  
Afib  
  
  
  
Advance Directives  
No Advanced Directives Records Found  
  
                                Advance Directive Response        Recorded Date/  
Time  
   
                                Advance Directives No              ,   
022 9:48am  
  
  
  
Family History  
No Family History Records FoundUnknown Family Member  
  
                                Name            Dates           Details  
   
                                                    FH: CABG (coronary artery by  
pass surgery): Father(V17.3, Z82.49)  
                                                    Status:Active  
   
                                                    Family history of lung cance  
r: Mother, Sibling(V16.1, Z80.1)  
                                                    Status:Active  
   
                                                    Family history of cerebrovas  
cular accident (CVA): Father(V17.1,   
Z82.3)  
                                                    Status:Active  
  
                              Unknown Family Member  
  
                                Name            Dates           Details  
   
                                                    FH: CABG (coronary artery by  
pass surgery): Father(V17.3, Z82.49)  
                                                    Status:Active  
   
                                                    Family history of cerebrovas  
cular accident (CVA): Father(V17.1,   
Z82.3)  
                                                    Status:Active  
   
                                                    Family history of lung cance  
r: Mother, Sibling(V16.1, Z80.1)  
                                                    Status:Active  
  
                              Unknown Family Member  
  
                                Name            Dates           Details  
   
                                                    FH: CABG (coronary artery by  
pass surgery): Father(V17.3, Z82.49)  
                                                    Status:Active  
   
                                                    Family history of lung cance  
r: Mother, Sibling(V16.1, Z80.1)  
                                                    Status:Active  
   
                                                    Family history of cerebrovas  
cular accident (CVA): Father(V17.1,   
Z82.3)  
                                                    Status:Active  
  
                              Unknown Family Member  
  
                                Name            Dates           Details  
   
                                                    FH: CABG (coronary artery by  
pass surgery): Father(V17.3, Z82.49)  
                                                    Status:Active  
   
                                                    Family history of lung cance  
r: Mother, Sibling(V16.1, Z80.1)  
                                                    Status:Active  
   
                                                    Family history of cerebrovas  
cular accident (CVA): Father(V17.1,   
Z82.3)  
                                                    Status:Active  
  
                              Unknown Family Member  
  
                                Name            Dates           Details  
   
                                                    FH: CABG (coronary artery by  
pass surgery): Father(V17.3, Z82.49)  
                                                    Status:Active  
   
                                                    Family history of lung cance  
r: Mother, Sibling(V16.1, Z80.1)  
                                                    Status:Active  
   
                                                    Family history of cerebrovas  
cular accident (CVA): Father(V17.1,   
Z82.3)  
                                                    Status:Active  
  
                              Unknown Family Member  
  
                                Name            Dates           Details  
   
                                                    FH: CABG (coronary artery by  
pass surgery): Father(V17.3, Z82.49)  
                                                    Status:Active  
   
                                                    Family history of lung cance  
r: Mother, Sibling(V16.1, Z80.1)  
                                                    Status:Active  
   
                                                    Family history of cerebrovas  
cular accident (CVA): Father(V17.1,   
Z82.3)  
                                                    Status:Active  
  
                              Unknown Family Member  
  
                                Name            Dates           Details  
   
                                                    Family history of cerebrovas  
cular accident (CVA): Father(V17.1,   
Z82.3)  
                                                    Status:Active  
   
                                                    Family history of lung cance  
r: Mother, Sibling(V16.1, Z80.1)  
                                                    Status:Active  
   
                                                    FH: CABG (coronary artery by  
pass surgery): Father(V17.3, Z82.49)  
                                                    Status:Active  
  
                              Unknown Family Member  
  
                                Name            Dates           Details  
   
                                                    FH: CABG (coronary artery by  
pass surgery): Father(V17.3, Z82.49)  
                                                    Status:Active  
   
                                                    Family history of lung cance  
r: Mother, Sibling(V16.1, Z80.1)  
                                                    Status:Active  
   
                                                    Family history of cerebrovas  
cular accident (CVA): Father(V17.1,   
Z82.3)  
                                                    Status:Active  
  
                              Unknown Family Member  
  
                                Name            Dates           Details  
   
                                                    FH: CABG (coronary artery by  
pass surgery): Father(V17.3, Z82.49)  
                                                    Status:Active  
   
                                                    Family history of lung cance  
r: Mother, Sibling(V16.1, Z80.1)  
                                                    Status:Active  
   
                                                    Family history of cerebrovas  
cular accident (CVA): Father(V17.1,   
Z82.3)  
                                                    Status:Active  
  
                              Unknown Family Member  
  
                                Name            Dates           Details  
   
                                                    FH: CABG (coronary artery by  
pass surgery): Father(V17.3, Z82.49)  
                                                    Status:Active  
   
                                                    Family history of lung cance  
r: Mother, Sibling(V16.1, Z80.1)  
                                                    Status:Active  
   
                                                    Family history of cerebrovas  
cular accident (CVA): Father(V17.1,   
Z82.3)  
                                                    Status:Active  
  
                              Unknown Family Member  
  
                                Name            Dates           Details  
   
                                                    FH: CABG (coronary artery by  
pass surgery): Father(V17.3, Z82.49)  
                                                    Status:Active  
   
                                                    Family history of lung cance  
r: Mother, Sibling(V16.1, Z80.1)  
                                                    Status:Active  
   
                                                    Family history of cerebrovas  
cular accident (CVA): Father(V17.1,   
Z82.3)  
                                                    Status:Active  
  
                              Unknown Family Member  
  
                                Name            Dates           Details  
   
                                                    FH: CABG (coronary artery by  
pass surgery): Father(V17.3, Z82.49)  
                                                    Status:Active  
   
                                                    Family history of lung cance  
r: Mother, Sibling(V16.1, Z80.1)  
                                                    Status:Active  
   
                                                    Family history of cerebrovas  
cular accident (CVA): Father(V17.1,   
Z82.3)  
                                                    Status:Active  
  
                              Unknown Family Member  
  
                                Name            Dates           Details  
   
                                                    FH: CABG (coronary artery by  
pass surgery): Father(V17.3, Z82.49)  
                                                    Status:Active  
   
                                                    Family history of lung cance  
r: Mother, Sibling(V16.1, Z80.1)  
                                                    Status:Active  
   
                                                    Family history of cerebrovas  
cular accident (CVA): Father(V17.1,   
Z82.3)  
                                                    Status:Active  
  
                              Unknown Family Member  
  
                                Name            Dates           Details  
   
                                                    FH: CABG (coronary artery by  
pass surgery): Father(V17.3, Z82.49)  
                                                    Status:Active  
   
                                                    Family history of lung cance  
r: Mother, Sibling(V16.1, Z80.1)  
                                                    Status:Active  
   
                                                    Family history of cerebrovas  
cular accident (CVA): Father(V17.1,   
Z82.3)  
                                                    Status:Active  
  
                              Unknown Family Member  
  
                                Name            Dates           Details  
   
                                                    FH: CABG (coronary artery by  
pass surgery): Father(V17.3, Z82.49)  
                                                    Status:Active  
   
                                                    Family history of lung cance  
r: Mother, Sibling(V16.1, Z80.1)  
                                                    Status:Active  
   
                                                    Family history of cerebrovas  
cular accident (CVA): Father(V17.1,   
Z82.3)  
                                                    Status:Active  
  
                              Unknown Family Member  
  
                                Name            Dates           Details  
   
                                                    FH: CABG (coronary artery by  
pass surgery): Father(V17.3, Z82.49)  
                                                    Status:Active  
   
                                                    Family history of lung cance  
r: Mother, Sibling(V16.1, Z80.1)  
                                                    Status:Active  
   
                                                    Family history of cerebrovas  
cular accident (CVA): Father(V17.1,   
Z82.3)  
                                                    Status:Active  
  
                              Unknown Family Member  
  
                                Name            Dates           Details  
   
                                                    FH: CABG (coronary artery by  
pass surgery): Father(V17.3, Z82.49)  
                                                    Status:Active  
   
                                                    Family history of lung cance  
r: Mother, Sibling(V16.1, Z80.1)  
                                                    Status:Active  
   
                                                    Family history of cerebrovas  
cular accident (CVA): Father(V17.1,   
Z82.3)  
                                                    Status:Active  
  
                              Unknown Family Member  
  
                                Name            Dates           Details  
   
                                                    FH: CABG (coronary artery by  
pass surgery): Father(V17.3, Z82.49)  
                                                    Status:Active  
   
                                                    Family history of lung cance  
r: Mother, Sibling(V16.1, Z80.1)  
                                                    Status:Active  
   
                                                    Family history of cerebrovas  
cular accident (CVA): Father(V17.1,   
Z82.3)  
                                                    Status:Active  
  
  
  
Summary Purpose  
  
  
                                                      
  
  
  
Chief Complaint  
ENMANUEL ARRINGTON is being seen for a 3 week follow-up of atrial fibrillation, atrial 
flutter and s/p PVI with adjuvant lesion RFA with Dr. Hernandes 2022.ENMANUEL ARRINGTON is being seen for a 3 month follow-up of atrial fibrillation.* ENMANUEL ARRINGTON 
  is being seen for s/p ablation.  
* 77-year-old gentleman returns for follow-up following recent A-fib ablation 
  with Dr. Hernandes, he is doing well from a cardiovascular standpoint with no 
  shortness of breath, palpitations, recurrent heart failure. He remains on 
  appropriate GDMT. He has a nonischemic cardiomyopathy by echo last year with 
  ejection fraction of 40 to 45%. Stress perfusion exam last year was normal.. 
  He was subsequently referred for ablation which has proceeded.  
* He has just returned from [place], he has put on a little weight after feeling
   better and restoration of normal sinus rhythm.  
* NYHA classification is class II/C  
* Recommendations, continue current therapies, continue Eliquis for the time 
  being, obtain echocardiogram for LV functional assessment and follow-up with 
  nurse practitioner within the next 6 months  
  
  
Additional Source Comments  
  
  
  
                                                    Care Teams (unrecognized sec  
tion and content)  
   
                                          
  
  
  
                                                    Team Status: Inactive   
   
                          Member       Role         Status       Dates  
   
                          Leatha Jansen MD Primary Care Provider, Attending Pr  
ovider Active         
   
                          W Ham Murphy DO Referring Provider Active         
  
  
  
                                                    Team Status: Active   
   
                          Member       Role         Status       Dates  
   
                          Leatha Jansen MD Primary Care Provider Active         
  
  
  
                                                    Team Status: Inactive   
   
                          Member       Role         Status       Dates  
   
                          Leatha Jansen MD Primary Care Provider Active         
   
                          LAURA Murphy DO Attending Provider Active         
  
  
  
                                                    Team Status: Inactive   
   
                          Member       Role         Status       Dates  
   
                          Leatha Jansen MD Primary Care Provider Active         
   
                          W Ham Murphy DO Attending Provider Active         
   
                          George Leonard MD Referring Provider Active         
  
  
  
                      Team Member Relationship Specialty  Start Date End Date  
   
                                                      
  
  
Leatha Jansen MD  
  
  
NPI: [id number]  
  
  
[address] [place] [address]  
  
  
[phone] (Work)  
  
  
[phone] (Fax) PCP - General                   00            
  
  
  
  
  
                                                    Goals (unrecognized section   
and content)  
   
                                                    Goals may be documented in a  
n alternate sectionGoals may be documented in an   
alternate sectionGoals may be documented in an alternate section  
  
  
  
  
                                                    PREGNANCY (unrecognized sect  
ion and content)  
   
                                                    No Pregnancy Status Records   
FoundNo Pregnancy Status Records FoundNo Pregnancy   
Status   
Records FoundNo Pregnancy Status Records FoundNo Pregnancy Status Records Found  
  
  
  
  
                                                    INFORMATION SOURCE (unrecogn  
ized section and content)  
   
                                          
  
  
  
                                        DATE CREATED        AUTHOR  
   
                                2022                      [facility]  
  
  
  
                                DATE CREATED    AUTHOR          AUTHOR'S ORGANIZ  
ATION  
   
                                2023                      [facility]  
  
  
  
                                DATE CREATED    AUTHOR          AUTHOR'S ORGANIZ  
ATION  
   
                                2023                      [facility]  
  
  
  
                                DATE CREATED    AUTHOR          AUTHOR'S ORGANIZ  
ATION  
   
                                2023                       [employer]  
  
  
  
                                DATE CREATED    AUTHOR          AUTHOR'S ORGANIZ  
ATION  
   
                                2023                      [facility]  
  
  
  
  
  
                                                    Reason for Visit (unrecogniz  
ed section and content)  
   
                                          
  
  
  
                                        Reason              Comments  
   
                                        Follow-up           6m  
  
  
FOR RECORDS PERTAINING TO PATIENTS WHO ARE OR HAVE BEEN ENROLLED IN A CHEMICAL 
DEPENDENCY/SUBSTANCEABUSE PROGRAM, SOME INFORMATION MAY BE OMITTED. This 
clinical summary was aggregated from multiple sources. Caution should be 
exercised in using it in the provision of clinical care. This summary normalizes
 information from multiple sources, and as a consequence, information in this 
document may materially change the coding, format and clinical context of 
patient data. In addition, data may be omitted in some cases. CLINICAL DECISIONS
 SHOULD BE BASED ON THE PRIMARY CLINICAL RECORDS. [employer] provides
 no warranty or guarantee of the accuracy or completeness of information in this
 document.

## 2024-02-08 LAB — SUMMARY REPORT (SUMMARY): (no result)

## 2024-02-15 LAB — SUMMARY REPORT (SUMMARY): (no result)

## 2024-04-15 DIAGNOSIS — I48.91 UNSPECIFIED ATRIAL FIBRILLATION (MULTI): ICD-10-CM

## 2024-04-18 RX ORDER — RIVAROXABAN 20 MG/1
20 TABLET, FILM COATED ORAL DAILY
Qty: 90 TABLET | Refills: 3 | Status: SHIPPED | OUTPATIENT
Start: 2024-04-18

## 2024-05-07 ENCOUNTER — OFFICE VISIT (OUTPATIENT)
Dept: CARDIOLOGY | Facility: CLINIC | Age: 79
End: 2024-05-07
Payer: MEDICARE

## 2024-05-07 VITALS
HEART RATE: 74 BPM | DIASTOLIC BLOOD PRESSURE: 62 MMHG | WEIGHT: 173 LBS | SYSTOLIC BLOOD PRESSURE: 98 MMHG | HEIGHT: 67 IN | BODY MASS INDEX: 27.15 KG/M2

## 2024-05-07 DIAGNOSIS — I42.8 NONISCHEMIC CARDIOMYOPATHY (MULTI): ICD-10-CM

## 2024-05-07 DIAGNOSIS — Z13.220 SCREENING FOR HYPERLIPIDEMIA: ICD-10-CM

## 2024-05-07 DIAGNOSIS — E66.3 OVERWEIGHT WITH BODY MASS INDEX (BMI) OF 27 TO 27.9 IN ADULT: ICD-10-CM

## 2024-05-07 DIAGNOSIS — I50.9 CHF (NYHA CLASS II, ACC/AHA STAGE C) (MULTI): ICD-10-CM

## 2024-05-07 DIAGNOSIS — Z87.891 FORMER SMOKER: ICD-10-CM

## 2024-05-07 DIAGNOSIS — Z79.01 LONG TERM CURRENT USE OF ANTICOAGULANT THERAPY: ICD-10-CM

## 2024-05-07 DIAGNOSIS — R06.09 DYSPNEA ON EXERTION: ICD-10-CM

## 2024-05-07 DIAGNOSIS — I48.0 PAROXYSMAL ATRIAL FIBRILLATION (MULTI): ICD-10-CM

## 2024-05-07 LAB
NON-UH HIE ANION GAP: 10.3 (ref 6–15)
NON-UH HIE B-TYPE NATRIURETIC PEPTIDE: 110 PG/ML (ref 5–100)
NON-UH HIE BLOOD UREA NITROGEN: 25 MG/DL (ref 7–25)
NON-UH HIE CALCIUM: 8.9 MG/DL (ref 8.6–10.3)
NON-UH HIE CARBON DIOXIDE: 28.4 MMOL/L (ref 21–31)
NON-UH HIE CHLORIDE: 104 MMOL/L (ref 98–107)
NON-UH HIE CHOL/HDL RATIO: 2.9
NON-UH HIE CHOLESTEROL: 163 MG/DL (ref 140–200)
NON-UH HIE CREATININE: 1.07 MG/DL (ref 0.7–1.3)
NON-UH HIE ESTIMATED GFR: > 60
NON-UH HIE GLUCOSE: 100 MG/DL (ref 70–100)
NON-UH HIE HDL CHOLESTEROL: 56 MG/DL (ref 23–92)
NON-UH HIE LDL CHOLESTEROL,CALCULATED: 100 MG/DL (ref 0–100)
NON-UH HIE POTASSIUM: 4.7 MMOL/L (ref 3.5–5.1)
NON-UH HIE SODIUM: 138 MMOL/L (ref 136–145)
NON-UH HIE TRIGLYCERIDE W/REFLEX: 37 MG/DL (ref 0–149)
NON-UH HIE VLDL CHOLESTEROL: 7 MG/DL

## 2024-05-07 PROCEDURE — 99214 OFFICE O/P EST MOD 30 MIN: CPT | Performed by: INTERNAL MEDICINE

## 2024-05-07 PROCEDURE — 93000 ELECTROCARDIOGRAM COMPLETE: CPT | Performed by: INTERNAL MEDICINE

## 2024-05-07 PROCEDURE — 1036F TOBACCO NON-USER: CPT | Performed by: INTERNAL MEDICINE

## 2024-05-07 PROCEDURE — 1160F RVW MEDS BY RX/DR IN RCRD: CPT | Performed by: INTERNAL MEDICINE

## 2024-05-07 PROCEDURE — 1159F MED LIST DOCD IN RCRD: CPT | Performed by: INTERNAL MEDICINE

## 2024-05-07 NOTE — PATIENT INSTRUCTIONS
Please bring all medicines, vitamins, and herbal supplements with you when you come to the office.    Prescriptions will not be filled unless you are compliant with your follow up appointments or have a follow up appointment scheduled as per instruction of your physician. Refills should be requested at the time of your visit.     BMI was above normal measurement. Current weight: 78.5 kg (173 lb)  Weight change since last visit (-) denotes wt loss -2 lbs   Weight loss needed to achieve BMI 25: 13.7 Lbs  Weight loss needed to achieve BMI 30: -18.1 Lbs  Provided instructions on dietary changes  Provided instructions on exercise.

## 2024-05-07 NOTE — PROGRESS NOTES
"Subjective   Jeramy Mckeon is a 78 y.o. male       Chief Complaint    Follow-up          78-year-old gentleman here for 6-month follow-up he is doing well he just returned from [place] and they had no cardiovascular events or problems down in [place].  He denies angina or shortness of breath or edema.  He has a history of atrial fibrillation, underwent successful ablation with Dr. Martinez over a year ago without any recurrence of A-fib.  He has a history of moderate nonischemic cardiomyopathy with normalized LV function.  He remains on Xarelto for thromboembolic protection.    Stress perfusion imaging from over a year ago was normal.  Echocardiogram from May 2023 revealed normal LV function.    His NYHA classification is I-II/C.    Recommendations: Obtain appropriate laboratories including BNP, chemistry panel, obtain ECG to confirm sinus rhythm, otherwise follow-up in 1 year         Review of Systems   All other systems reviewed and are negative.           Vitals:    05/07/24 0927   BP: 98/62   BP Location: Right arm   Patient Position: Sitting   Pulse: 74   Weight: 78.5 kg (173 lb)   Height: 1.702 m (5' 7\")   EKG done in office today      Objective   Physical Exam  Constitutional:       Appearance: Normal appearance.   HENT:      Nose: Nose normal.   Neck:      Vascular: No carotid bruit.   Cardiovascular:      Rate and Rhythm: Normal rate.      Pulses: Normal pulses.      Heart sounds: Normal heart sounds.   Pulmonary:      Effort: Pulmonary effort is normal.   Abdominal:      General: Bowel sounds are normal.      Palpations: Abdomen is soft.   Musculoskeletal:         General: Normal range of motion.      Cervical back: Normal range of motion.      Right lower leg: No edema.      Left lower leg: No edema.   Skin:     General: Skin is warm and dry.   Neurological:      General: No focal deficit present.      Mental Status: He is alert.   Psychiatric:         Mood and Affect: Mood normal.         Behavior: Behavior " normal.         Thought Content: Thought content normal.         Judgment: Judgment normal.         Allergies  Patient has no known allergies.     Current Medications    Current Outpatient Medications:     carvedilol (Coreg) 3.125 mg tablet, TAKE 1 TABLET BY MOUTH TWICE A DAY WITH MORNING AND EVENING MEAL, Disp: 180 tablet, Rfl: 3    losartan (Cozaar) 25 mg tablet, Take 1 tablet (25 mg) by mouth once daily., Disp: , Rfl:     spironolactone (Aldactone) 25 mg tablet, Take 1 tablet (25 mg) by mouth once daily., Disp: 90 tablet, Rfl: 3    Xarelto 20 mg tablet, TAKE 1 TABLET BY MOUTH EVERY DAY, Disp: 90 tablet, Rfl: 3                     Assessment/Plan   1. Paroxysmal atrial fibrillation (Multi)  Follow Up In Cardiology      2. Long term current use of anticoagulant therapy  Follow Up In Cardiology      3. CHF (NYHA class II, ACC/AHA stage C) (Multi)        4. Nonischemic cardiomyopathy (Multi)        5. Dyspnea on exertion        6. Overweight with body mass index (BMI) of 27 to 27.9 in adult        7. Former smoker        8. Screening for hyperlipidemia                 Scribe Attestation  By signing my name below, IAntonette LPN Scribjassi   attest that this documentation has been prepared under the direction and in the presence of Luis Bennett DO.     Provider Attestation - Scribe documentation    All medical record entries made by the Scribe were at my direction and personally dictated by me. I have reviewed the chart and agree that the record accurately reflects my personal performance of the history, physical exam, discussion and plan.

## 2024-05-07 NOTE — LETTER
"May 7, 2024     Vidal Ospina MD  [address] [facility] [place]  [facility] [address]    Patient: Jeramy Mckeon   YOB: 1945   Date of Visit: 5/7/2024       Dear Dr. Vidal Ospina MD:    Thank you for referring Jeramy Mckeon to me for evaluation. Below are my notes for this consultation.  If you have questions, please do not hesitate to call me. I look forward to following your patient along with you.       Sincerely,     Luis Bennett, DO      CC: No Recipients  ______________________________________________________________________________________    Subjective   Jeramy Mckeon is a 78 y.o. male       Chief Complaint    Follow-up          78-year-old gentleman here for 6-month follow-up he is doing well he just returned from [place] and they had no cardiovascular events or problems down in [place].  He denies angina or shortness of breath or edema.  He has a history of atrial fibrillation, underwent successful ablation with Dr. Martinez over a year ago without any recurrence of A-fib.  He has a history of moderate nonischemic cardiomyopathy with normalized LV function.  He remains on Xarelto for thromboembolic protection.    Stress perfusion imaging from over a year ago was normal.  Echocardiogram from May 2023 revealed normal LV function.    His NYHA classification is I-II/C.    Recommendations: Obtain appropriate laboratories including BNP, chemistry panel, obtain ECG to confirm sinus rhythm, otherwise follow-up in 1 year         Review of Systems   All other systems reviewed and are negative.           Vitals:    05/07/24 0927   BP: 98/62   BP Location: Right arm   Patient Position: Sitting   Pulse: 74   Weight: 78.5 kg (173 lb)   Height: 1.702 m (5' 7\")   EKG done in office today      Objective   Physical Exam  Constitutional:       Appearance: Normal appearance.   HENT:      Nose: Nose normal.   Neck:      Vascular: No carotid bruit.   Cardiovascular:      Rate and Rhythm: Normal " rate.      Pulses: Normal pulses.      Heart sounds: Normal heart sounds.   Pulmonary:      Effort: Pulmonary effort is normal.   Abdominal:      General: Bowel sounds are normal.      Palpations: Abdomen is soft.   Musculoskeletal:         General: Normal range of motion.      Cervical back: Normal range of motion.      Right lower leg: No edema.      Left lower leg: No edema.   Skin:     General: Skin is warm and dry.   Neurological:      General: No focal deficit present.      Mental Status: He is alert.   Psychiatric:         Mood and Affect: Mood normal.         Behavior: Behavior normal.         Thought Content: Thought content normal.         Judgment: Judgment normal.         Allergies  Patient has no known allergies.     Current Medications    Current Outpatient Medications:   •  carvedilol (Coreg) 3.125 mg tablet, TAKE 1 TABLET BY MOUTH TWICE A DAY WITH MORNING AND EVENING MEAL, Disp: 180 tablet, Rfl: 3  •  losartan (Cozaar) 25 mg tablet, Take 1 tablet (25 mg) by mouth once daily., Disp: , Rfl:   •  spironolactone (Aldactone) 25 mg tablet, Take 1 tablet (25 mg) by mouth once daily., Disp: 90 tablet, Rfl: 3  •  Xarelto 20 mg tablet, TAKE 1 TABLET BY MOUTH EVERY DAY, Disp: 90 tablet, Rfl: 3                     Assessment/Plan   1. Paroxysmal atrial fibrillation (Multi)  Follow Up In Cardiology      2. Long term current use of anticoagulant therapy  Follow Up In Cardiology      3. CHF (NYHA class II, ACC/AHA stage C) (Multi)        4. Nonischemic cardiomyopathy (Multi)        5. Dyspnea on exertion        6. Overweight with body mass index (BMI) of 27 to 27.9 in adult        7. Former smoker        8. Screening for hyperlipidemia                 Scribe Attestation  By signing my name below, Antonette LONGO LPN  , Scribe   attest that this documentation has been prepared under the direction and in the presence of Luis Bennett DO.     Provider Attestation - Scribe documentation    All medical record entries  made by the Scribe were at my direction and personally dictated by me. I have reviewed the chart and agree that the record accurately reflects my personal performance of the history, physical exam, discussion and plan.

## 2024-05-11 DIAGNOSIS — I42.8 OTHER CARDIOMYOPATHIES (MULTI): ICD-10-CM

## 2024-05-16 RX ORDER — LOSARTAN POTASSIUM 25 MG/1
25 TABLET ORAL DAILY
Qty: 90 TABLET | Refills: 3 | Status: SHIPPED | OUTPATIENT
Start: 2024-05-16

## 2025-01-07 DIAGNOSIS — I48.0 PAROXYSMAL ATRIAL FIBRILLATION (MULTI): ICD-10-CM

## 2025-01-07 DIAGNOSIS — I42.8 NONISCHEMIC CARDIOMYOPATHY (MULTI): ICD-10-CM

## 2025-01-07 RX ORDER — SPIRONOLACTONE 25 MG/1
25 TABLET ORAL DAILY
Qty: 90 TABLET | Refills: 3 | Status: SHIPPED | OUTPATIENT
Start: 2025-01-07

## 2025-02-09 DIAGNOSIS — I42.8 OTHER CARDIOMYOPATHIES: ICD-10-CM

## 2025-02-11 RX ORDER — LOSARTAN POTASSIUM 25 MG/1
25 TABLET ORAL DAILY
Qty: 90 TABLET | Refills: 3 | Status: SHIPPED | OUTPATIENT
Start: 2025-02-11 | End: 2026-02-11

## 2025-04-10 DIAGNOSIS — I48.91 UNSPECIFIED ATRIAL FIBRILLATION (MULTI): ICD-10-CM

## 2025-04-11 NOTE — TELEPHONE ENCOUNTER
Patient's wife phoned back. States paper rx can be faxed to [facility]. Must have ID number on it: [id number]. Phone number [phone] and fax number [phone]. Printed RX on Dr. Luis Bennett, DO for signature.

## 2025-04-17 ENCOUNTER — HOSPITAL ENCOUNTER (OUTPATIENT)
Dept: HOSPITAL 101 - MM | Age: 80
LOS: 13 days | Discharge: HOME | End: 2025-04-30
Payer: MEDICARE

## 2025-04-17 DIAGNOSIS — Z79.01: ICD-10-CM

## 2025-04-17 DIAGNOSIS — Z51.81: Primary | ICD-10-CM

## 2025-04-17 DIAGNOSIS — I48.0: ICD-10-CM

## 2025-04-17 PROCEDURE — G0463 HOSPITAL OUTPT CLINIC VISIT: HCPCS

## 2025-04-17 PROCEDURE — 85610 PROTHROMBIN TIME: CPT

## 2025-04-26 DIAGNOSIS — I48.91 UNSPECIFIED ATRIAL FIBRILLATION (MULTI): ICD-10-CM

## 2025-04-28 RX ORDER — CARVEDILOL 3.12 MG/1
6.25 TABLET ORAL
Qty: 180 TABLET | Refills: 3 | Status: SHIPPED | OUTPATIENT
Start: 2025-04-28

## 2025-05-01 ENCOUNTER — HOSPITAL ENCOUNTER (OUTPATIENT)
Dept: HOSPITAL 101 - MM | Age: 80
LOS: 29 days | Discharge: HOME | End: 2025-05-30
Payer: MEDICARE

## 2025-05-01 DIAGNOSIS — Z79.01: ICD-10-CM

## 2025-05-01 DIAGNOSIS — Z51.81: Primary | ICD-10-CM

## 2025-05-01 DIAGNOSIS — I48.0: ICD-10-CM

## 2025-05-13 ENCOUNTER — APPOINTMENT (OUTPATIENT)
Dept: CARDIOLOGY | Facility: CLINIC | Age: 80
End: 2025-05-13
Payer: MEDICARE

## 2025-05-13 VITALS
DIASTOLIC BLOOD PRESSURE: 60 MMHG | HEIGHT: 66 IN | WEIGHT: 156.2 LBS | SYSTOLIC BLOOD PRESSURE: 102 MMHG | HEART RATE: 72 BPM | BODY MASS INDEX: 25.1 KG/M2

## 2025-05-13 DIAGNOSIS — I42.8 NONISCHEMIC CARDIOMYOPATHY (MULTI): ICD-10-CM

## 2025-05-13 DIAGNOSIS — Z87.891 FORMER SMOKER: ICD-10-CM

## 2025-05-13 DIAGNOSIS — I48.0 PAROXYSMAL ATRIAL FIBRILLATION (MULTI): ICD-10-CM

## 2025-05-13 DIAGNOSIS — I50.9 CHF (NYHA CLASS II, ACC/AHA STAGE C) (MULTI): ICD-10-CM

## 2025-05-13 PROCEDURE — 99213 OFFICE O/P EST LOW 20 MIN: CPT | Performed by: INTERNAL MEDICINE

## 2025-05-13 PROCEDURE — 1159F MED LIST DOCD IN RCRD: CPT | Performed by: INTERNAL MEDICINE

## 2025-05-13 PROCEDURE — 1036F TOBACCO NON-USER: CPT | Performed by: INTERNAL MEDICINE

## 2025-05-13 PROCEDURE — 1160F RVW MEDS BY RX/DR IN RCRD: CPT | Performed by: INTERNAL MEDICINE

## 2025-05-13 NOTE — PATIENT INSTRUCTIONS
Please bring all medicines, vitamins, and herbal supplements with you when you come to the office.    Prescriptions will not be filled unless you are compliant with your follow up appointments or have a follow up appointment scheduled as per instruction of your physician. Refills should be requested at the time of your visit.     BMI was above normal measurement. Current weight: 70.9 kg (156 lb 3.2 oz)  Weight change since last visit (-) denotes wt loss -16.8 lbs   Weight loss needed to achieve BMI 25: 1.6 Lbs  Weight loss needed to achieve BMI 30: -29.3 Lbs  Provided instructions on dietary changes  Provided instructions on exercise.

## 2025-05-13 NOTE — LETTER
"May 13, 2025     Vidal Ospina MD  [address] [facility] [place]  [facility] [address]    Patient: Jeramy Mckeon   YOB: 1945   Date of Visit: 5/13/2025       Dear Dr. Vidal Ospina MD:    Thank you for referring Jeramy Mckeon to me for evaluation. Below are my notes for this consultation.  If you have questions, please do not hesitate to call me. I look forward to following your patient along with you.       Sincerely,     Luis Bennett, DO      CC: No Recipients  ______________________________________________________________________________________    Chief Complaint   Patient presents with   • Annual Exam     1 year follow up for congestive heart failure        Subjective   Jeramy Mckeon is a 79 y.o. male     79-year-old healthy gentleman returns for annual cardiovascular follow-up for ongoing cardiovascular assessment for nonischemic cardiomyopathy and paroxysmal atrial fibrillation.  He is otherwise doing well, denies any recurrent tachyarrhythmic events, bleeding events, denies shortness of breath or edema.  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    He has a history of paroxysmal A-fib and nonischemic cardiomyopathy with normalized LV function details of his echo from 2023 are reviewed    He remains on appropriate GDMT    Recommendations: Obtain chemistry panel, continue current therapies, continue excellent healthy lifestyle and follow-up in 1         Review of Systems   All other systems reviewed and are negative.           Vitals:    05/13/25 0941   BP: 102/60   BP Location: Right arm   Patient Position: Sitting   Pulse: 72   Weight: 70.9 kg (156 lb 3.2 oz)   Height: 1.676 m (5' 6\")        Objective   Physical Exam  Constitutional:       Appearance: Normal appearance.   HENT:      Nose: Nose normal.   Neck:      Vascular: No carotid bruit.   Cardiovascular:      Rate and Rhythm: Normal " rate.      Pulses: Normal pulses.      Heart sounds: Normal heart sounds.   Pulmonary:      Effort: Pulmonary effort is normal.   Abdominal:      General: Bowel sounds are normal.      Palpations: Abdomen is soft.   Musculoskeletal:         General: Normal range of motion.      Cervical back: Normal range of motion.      Right lower leg: No edema.      Left lower leg: No edema.   Skin:     General: Skin is warm and dry.   Neurological:      General: No focal deficit present.      Mental Status: He is alert.   Psychiatric:         Mood and Affect: Mood normal.         Behavior: Behavior normal.         Thought Content: Thought content normal.         Judgment: Judgment normal.         Allergies  Patient has no known allergies.     Current Medications  Current Outpatient Medications   Medication Instructions   • carvedilol (COREG) 6.25 mg, oral, 2 times daily after meals   • losartan (COZAAR) 25 mg, oral, Daily   • rivaroxaban (XARELTO) 20 mg, oral, Daily   • spironolactone (ALDACTONE) 25 mg, oral, Daily                        Assessment/Plan   1. CHF (NYHA class II, ACC/AHA stage C) (Multi)  Follow Up In Cardiology      2. Paroxysmal atrial fibrillation (Multi)        3. Nonischemic cardiomyopathy (Multi)        4. Former smoker        5. BMI 25.0-25.9,adult                 Scribe Attestation  By signing my name below, IGina LPN, Scribe   attest that this documentation has been prepared under the direction and in the presence of Luis Bennett DO.     Provider Attestation - Scribe documentation    All medical record entries made by the Scribe were at my direction and personally dictated by me. I have reviewed the chart and agree that the record accurately reflects my personal performance of the history, physical exam, discussion and plan.

## 2025-05-13 NOTE — PROGRESS NOTES
"Chief Complaint   Patient presents with    Annual Exam     1 year follow up for congestive heart failure        Subjective   Jeramy Mckeon is a 79 y.o. male     79-year-old healthy gentleman returns for annual cardiovascular follow-up for ongoing cardiovascular assessment for nonischemic cardiomyopathy and paroxysmal atrial fibrillation.  He is otherwise doing well, denies any recurrent tachyarrhythmic events, bleeding events, denies shortness of breath or edema.  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    He has a history of paroxysmal A-fib and nonischemic cardiomyopathy with normalized LV function details of his echo from 2023 are reviewed    He remains on appropriate GDMT    Recommendations: Obtain chemistry panel, continue current therapies, continue excellent healthy lifestyle and follow-up in 1         Review of Systems   All other systems reviewed and are negative.           Vitals:    05/13/25 0941   BP: 102/60   BP Location: Right arm   Patient Position: Sitting   Pulse: 72   Weight: 70.9 kg (156 lb 3.2 oz)   Height: 1.676 m (5' 6\")        Objective   Physical Exam  Constitutional:       Appearance: Normal appearance.   HENT:      Nose: Nose normal.   Neck:      Vascular: No carotid bruit.   Cardiovascular:      Rate and Rhythm: Normal rate.      Pulses: Normal pulses.      Heart sounds: Normal heart sounds.   Pulmonary:      Effort: Pulmonary effort is normal.   Abdominal:      General: Bowel sounds are normal.      Palpations: Abdomen is soft.   Musculoskeletal:         General: Normal range of motion.      Cervical back: Normal range of motion.      Right lower leg: No edema.      Left lower leg: No edema.   Skin:     General: Skin is warm and dry.   Neurological:      General: No focal deficit present.      Mental Status: He is alert.   Psychiatric:         Mood and Affect: Mood normal.         Behavior: Behavior " normal.         Thought Content: Thought content normal.         Judgment: Judgment normal.         Allergies  Patient has no known allergies.     Current Medications  Current Outpatient Medications   Medication Instructions    carvedilol (COREG) 6.25 mg, oral, 2 times daily after meals    losartan (COZAAR) 25 mg, oral, Daily    rivaroxaban (XARELTO) 20 mg, oral, Daily    spironolactone (ALDACTONE) 25 mg, oral, Daily                        Assessment/Plan   1. CHF (NYHA class II, ACC/AHA stage C) (Multi)  Follow Up In Cardiology      2. Paroxysmal atrial fibrillation (Multi)        3. Nonischemic cardiomyopathy (Multi)        4. Former smoker        5. BMI 25.0-25.9,adult                 Scribe Attestation  By signing my name below, IGina LPN , [place]   attest that this documentation has been prepared under the direction and in the presence of Luis Bennett DO.     Provider Attestation - Scribe documentation    All medical record entries made by the Scribe were at my direction and personally dictated by me. I have reviewed the chart and agree that the record accurately reflects my personal performance of the history, physical exam, discussion and plan.

## 2025-05-27 ENCOUNTER — HOSPITAL ENCOUNTER
Dept: HOSPITAL 101 - LAB | Age: 80
Discharge: HOME | End: 2025-05-27
Payer: MEDICARE

## 2025-05-27 DIAGNOSIS — I34.0: ICD-10-CM

## 2025-05-27 DIAGNOSIS — R73.09: ICD-10-CM

## 2025-05-27 DIAGNOSIS — Z12.5: ICD-10-CM

## 2025-05-27 DIAGNOSIS — E78.5: ICD-10-CM

## 2025-05-27 DIAGNOSIS — J21.9: ICD-10-CM

## 2025-05-27 DIAGNOSIS — I51.7: Primary | ICD-10-CM

## 2025-05-27 LAB
ADD MANUAL DIFF: NO
ALANINE AMINOTRANSFERASE: 24 U/L (ref 16–63)
ALBUMIN GLOBULIN RATIO: 1.1
ALBUMIN LEVEL: 3.9 G/DL (ref 3.4–5)
ALKALINE PHOSPHATASE: 76 U/L (ref 46–116)
ANION GAP: 12.8
ASPARTATE AMINO TRANSFERASE: 23 U/L (ref 15–37)
CALCIUM: 9.5 MG/DL (ref 8.5–10.1)
CHLORIDE: 105 MMOL/L (ref 98–107)
CHOLESTEROL: 194 MG/DL (ref ?–200)
CO2 BLD-SCNC: 27.5 MMOL/L (ref 21–32)
ESTIMATED GFR (AFRICAN AMERICA: >60
ESTIMATED GFR (NON-AFRICAN AME: >60
FREE T3: 2.39 PG/ML (ref 2.18–3.98)
GLOBULIN: 3.5 G/DL
GLUCOSE SERPLBLD-MCNC: 107 MG/DL (ref 74–106)
HCT VFR BLD CALC: 41.9 % (ref 42–54)
HDL CHOLESTEROL: 77 MG/DL (ref 40–60)
HEMOGLOBIN: 14.2 G/DL (ref 14–18)
IMMATURE GRANULOCYTES ABS AUTO: 0.02 10^3/UL (ref 0–0.03)
IMMATURE GRANULOCYTES PCT AUTO: 0.3 % (ref 0–0.5)
LYMPHOCYTES  ABSOLUTE AUTO: 2.1 10^3/UL (ref 1.2–3.8)
MCH RBC QN AUTO: 33.9 PG (ref 25.9–34)
MEAN CORPUSCULAR HGB CONC: 33.9 G/DL (ref 29.9–35.2)
PLATELET # BLD: 196 10^3/UL (ref 150–450)
POTASSIUM SERPLBLD-SCNC: 4.3 MMOL/L (ref 3.5–5.1)
RBC # BLD AUTO: 4.19 10^6/UL (ref 4.7–6.1)
SODIUM BLD-SCNC: 141 MMOL/L (ref 136–145)
THYROID STIMULATING HORMONE: 1.37 UIU/ML (ref 0.36–3.74)
TOTAL PROTEIN: 7.4 G/DL (ref 6.4–8.2)
TRIGLYCERIDES: 48 MG/DL (ref ?–150)
VLDL CHOLESTEROL: 9.6 MG/DL
WBC # BLD: 6.9 10^3/UL (ref 4–11)

## 2025-05-27 PROCEDURE — 84436 ASSAY OF TOTAL THYROXINE: CPT

## 2025-05-27 PROCEDURE — G0103 PSA SCREENING: HCPCS

## 2025-05-27 PROCEDURE — 80053 COMPREHEN METABOLIC PANEL: CPT

## 2025-05-27 PROCEDURE — 36415 COLL VENOUS BLD VENIPUNCTURE: CPT

## 2025-05-27 PROCEDURE — 83036 HEMOGLOBIN GLYCOSYLATED A1C: CPT

## 2025-05-27 PROCEDURE — 80061 LIPID PANEL: CPT

## 2025-05-27 PROCEDURE — 84443 ASSAY THYROID STIM HORMONE: CPT

## 2025-05-27 PROCEDURE — 85025 COMPLETE CBC W/AUTO DIFF WBC: CPT

## 2025-05-27 PROCEDURE — 84481 FREE ASSAY (FT-3): CPT

## 2025-06-01 ENCOUNTER — HOSPITAL ENCOUNTER (OUTPATIENT)
Dept: HOSPITAL 101 - MM | Age: 80
LOS: 2 days | Discharge: HOME | End: 2025-06-03
Payer: MEDICARE

## 2025-06-01 DIAGNOSIS — I48.0: Primary | ICD-10-CM

## 2025-06-13 ENCOUNTER — HOSPITAL ENCOUNTER
Dept: HOSPITAL 101 - PST | Age: 80
Discharge: HOME | End: 2025-06-13
Payer: MEDICARE

## 2025-06-13 DIAGNOSIS — D48.5: ICD-10-CM

## 2025-06-13 DIAGNOSIS — Z01.818: Primary | ICD-10-CM

## 2025-06-16 ENCOUNTER — TELEPHONE (OUTPATIENT)
Dept: CARDIOLOGY | Facility: CLINIC | Age: 80
End: 2025-06-16
Payer: MEDICARE

## 2025-06-16 NOTE — TELEPHONE ENCOUNTER
Dr. Morris's office phones requesting permission for patient to hold xarelto 2 days prior to scheduled excisional biopsy of lesion on patient's arm scheduled for Wednesday 6/18.    Please advise     Fax: [phone]

## 2025-06-18 ENCOUNTER — HOSPITAL ENCOUNTER (OUTPATIENT)
Dept: HOSPITAL 101 - SURGOUT | Age: 80
Discharge: HOME | End: 2025-06-18
Payer: MEDICARE

## 2025-06-18 VITALS — SYSTOLIC BLOOD PRESSURE: 120 MMHG | OXYGEN SATURATION: 99 % | HEART RATE: 65 BPM | DIASTOLIC BLOOD PRESSURE: 70 MMHG

## 2025-06-18 VITALS
TEMPERATURE: 97.52 F | DIASTOLIC BLOOD PRESSURE: 73 MMHG | HEART RATE: 78 BPM | OXYGEN SATURATION: 96 % | SYSTOLIC BLOOD PRESSURE: 112 MMHG

## 2025-06-18 DIAGNOSIS — C44.622: Primary | ICD-10-CM

## 2025-06-18 DIAGNOSIS — Z85.828: ICD-10-CM

## 2025-06-18 PROCEDURE — 88305 TISSUE EXAM BY PATHOLOGIST: CPT

## 2025-06-18 PROCEDURE — 11604 EXC TR-EXT MAL+MARG 3.1-4 CM: CPT

## 2025-06-18 PROCEDURE — 12032 INTMD RPR S/A/T/EXT 2.6-7.5: CPT

## 2025-06-18 RX ADMIN — BUPIVACAINE HYDROCHLORIDE 7 ML: 5 INJECTION, SOLUTION EPIDURAL; INTRACAUDAL; PERINEURAL at 12:23

## 2025-06-26 ENCOUNTER — HOSPITAL ENCOUNTER
Dept: HOSPITAL 101 - LAB | Age: 80
Discharge: HOME | End: 2025-06-26
Payer: MEDICARE

## 2025-06-26 DIAGNOSIS — R17: Primary | ICD-10-CM

## 2025-06-26 LAB
ALANINE AMINOTRANSFERASE: 23 U/L (ref 16–63)
ALBUMIN GLOBULIN RATIO: 1
ALBUMIN LEVEL: 3.4 G/DL (ref 3.4–5)
ALKALINE PHOSPHATASE: 69 U/L (ref 46–116)
ANION GAP: 10
ASPARTATE AMINO TRANSFERASE: 18 U/L (ref 15–37)
BILIRUBIN TOTAL: 1.8 MG/DL (ref 0.2–1)
BUN SERPL-MCNC: 28 MG/DL (ref 7–18)
BUN/CREAT SERPL: 23
CALCIUM: 9 MG/DL (ref 8.5–10.1)
CHLORIDE: 103 MMOL/L (ref 98–107)
CO2 BLD-SCNC: 29.2 MMOL/L (ref 21–32)
CREAT SERPL-SCNC: 1.22 MG/DL (ref 0.7–1.3)
ESTIMATED GFR (AFRICAN AMERICA: >60
ESTIMATED GFR (NON-AFRICAN AME: 57
GLOBULIN: 3.3 G/DL
GLUCOSE SERPLBLD-MCNC: 92 MG/DL (ref 74–106)
POTASSIUM SERPLBLD-SCNC: 4.2 MMOL/L (ref 3.5–5.1)
SODIUM BLD-SCNC: 138 MMOL/L (ref 136–145)
TOTAL PROTEIN: 6.7 G/DL (ref 6.4–8.2)

## 2025-06-26 PROCEDURE — 36415 COLL VENOUS BLD VENIPUNCTURE: CPT

## 2025-06-26 PROCEDURE — 80053 COMPREHEN METABOLIC PANEL: CPT

## 2025-11-13 ENCOUNTER — APPOINTMENT (OUTPATIENT)
Dept: CARDIOLOGY | Facility: CLINIC | Age: 80
End: 2025-11-13
Payer: MEDICARE